# Patient Record
Sex: MALE | Race: BLACK OR AFRICAN AMERICAN | Employment: UNEMPLOYED | URBAN - METROPOLITAN AREA
[De-identification: names, ages, dates, MRNs, and addresses within clinical notes are randomized per-mention and may not be internally consistent; named-entity substitution may affect disease eponyms.]

---

## 2017-02-08 ENCOUNTER — HOSPITAL ENCOUNTER (EMERGENCY)
Age: 62
Discharge: HOME | End: 2017-02-08
Attending: INTERNAL MEDICINE
Payer: COMMERCIAL

## 2017-02-08 VITALS
HEIGHT: 69 IN | WEIGHT: 287 LBS | TEMPERATURE: 98.6 F | SYSTOLIC BLOOD PRESSURE: 146 MMHG | RESPIRATION RATE: 18 BRPM | BODY MASS INDEX: 42.51 KG/M2 | HEART RATE: 94 BPM | DIASTOLIC BLOOD PRESSURE: 82 MMHG | OXYGEN SATURATION: 97 %

## 2017-02-08 DIAGNOSIS — R60.9 EDEMA, UNSPECIFIED TYPE: Primary | ICD-10-CM

## 2017-02-08 PROCEDURE — 99282 EMERGENCY DEPT VISIT SF MDM: CPT

## 2017-02-08 PROCEDURE — 74011250636 HC RX REV CODE- 250/636: Performed by: INTERNAL MEDICINE

## 2017-02-08 PROCEDURE — 96372 THER/PROPH/DIAG INJ SC/IM: CPT

## 2017-02-08 RX ORDER — KETOROLAC TROMETHAMINE 30 MG/ML
30 INJECTION, SOLUTION INTRAMUSCULAR; INTRAVENOUS
Status: COMPLETED | OUTPATIENT
Start: 2017-02-08 | End: 2017-02-08

## 2017-02-08 RX ORDER — BUMETANIDE 2 MG/1
2 TABLET ORAL 3 TIMES DAILY
Qty: 10 TAB | Refills: 0 | Status: SHIPPED | OUTPATIENT
Start: 2017-02-08 | End: 2017-02-12

## 2017-02-08 RX ORDER — HYDROCODONE BITARTRATE AND ACETAMINOPHEN 5; 325 MG/1; MG/1
1 TABLET ORAL
Qty: 6 TAB | Refills: 0 | Status: SHIPPED | OUTPATIENT
Start: 2017-02-08 | End: 2017-03-11

## 2017-02-08 RX ADMIN — KETOROLAC TROMETHAMINE 30 MG: 30 INJECTION, SOLUTION INTRAMUSCULAR at 20:09
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
## 2017-02-09 NOTE — DISCHARGE INSTRUCTIONS
Leg and Ankle Edema: Care Instructions  Your Care Instructions  Swelling in the legs, ankles, and feet is called edema. It is common after you sit or stand for a while. Long plane flights or car rides often cause swelling in the legs and feet. You may also have swelling if you have to stand for long periods of time at your job. Problems with the veins in the legs (varicose veins) and changes in hormones can also cause swelling. Sometimes the swelling in the ankles and feet is caused by a more serious problem, such as heart failure, infection, blood clots, or liver or kidney disease. Follow-up care is a key part of your treatment and safety. Be sure to make and go to all appointments, and call your doctor if you are having problems. Its also a good idea to know your test results and keep a list of the medicines you take. How can you care for yourself at home? · If your doctor gave you medicine, take it as prescribed. Call your doctor if you think you are having a problem with your medicine. · Whenever you are resting, raise your legs up. Try to keep the swollen area higher than the level of your heart. · Take breaks from standing or sitting in one position. ¨ Walk around to increase the blood flow in your lower legs. ¨ Move your feet and ankles often while you stand, or tighten and relax your leg muscles. · Wear support stockings. Put them on in the morning, before swelling gets worse. · Eat a balanced diet. Lose weight if you need to. · Limit the amount of salt (sodium) in your diet. Salt holds fluid in the body and may increase swelling. When should you call for help? Call 911 anytime you think you may need emergency care. For example, call if:  · You have symptoms of a blood clot in your lung (called a pulmonary embolism). These may include:  ¨ Sudden chest pain. ¨ Trouble breathing. ¨ Coughing up blood.   Call your doctor now or seek immediate medical care if:  · You have signs of a blood clot, such as:  ¨ Pain in your calf, back of the knee, thigh, or groin. ¨ Redness and swelling in your leg or groin. · You have symptoms of infection, such as:  ¨ Increased pain, swelling, warmth, or redness. ¨ Red streaks or pus. ¨ A fever. Watch closely for changes in your health, and be sure to contact your doctor if:  · Your swelling is getting worse. · You have new or worsening pain in your legs. · You do not get better as expected. Where can you learn more? Go to http://candice-adri.info/. Enter M352 in the search box to learn more about \"Leg and Ankle Edema: Care Instructions. \"  Current as of: May 27, 2016  Content Version: 11.1  © 5774-6015 [employer], [employer]. Care instructions adapted under license by [employer] (which disclaims liability or warranty for this information). If you have questions about a medical condition or this instruction, always ask your healthcare professional. [place] [address] any warranty or liability for your use of this information.

## 2017-02-09 NOTE — ED NOTES

## 2017-02-09 NOTE — ED NOTES
Patient reports to ED c/o left foot pain. Patient denies injury/trauma. Swelling, warmth and edema noted to foot. Patient ambulatory in room. Patient in NAD. Emergency Department Nursing Plan of Care       The Nursing Plan of Care is developed from the Nursing assessment and Emergency Department Attending provider initial evaluation. The plan of care may be reviewed in the ED Provider note.     The Plan of Care was developed with the following considerations:   Patient / Family readiness to learn indicated by:verbalized understanding  Persons(s) to be included in education: patient  Barriers to Learning/Limitations:No    Signed     Eyal Braden RN    2/8/2017   8:20 PM

## 2017-03-11 ENCOUNTER — HOSPITAL ENCOUNTER (EMERGENCY)
Age: 62
Discharge: HOME | End: 2017-03-11
Attending: EMERGENCY MEDICINE
Payer: COMMERCIAL

## 2017-03-11 VITALS
HEART RATE: 90 BPM | WEIGHT: 285 LBS | TEMPERATURE: 99 F | SYSTOLIC BLOOD PRESSURE: 151 MMHG | BODY MASS INDEX: 44.73 KG/M2 | HEIGHT: 67 IN | DIASTOLIC BLOOD PRESSURE: 87 MMHG | OXYGEN SATURATION: 96 % | RESPIRATION RATE: 18 BRPM

## 2017-03-11 DIAGNOSIS — R60.0 BILATERAL LEG EDEMA: Primary | ICD-10-CM

## 2017-03-11 DIAGNOSIS — S91.109A WOUND, OPEN, TOE, INITIAL ENCOUNTER: ICD-10-CM

## 2017-03-11 LAB
ALBUMIN SERPL BCP-MCNC: 2.7 G/DL (ref 3.5–5)
ALBUMIN/GLOB SERPL: 0.4 {RATIO} (ref 1.1–2.2)
ALP SERPL-CCNC: 136 U/L (ref 45–117)
ALT SERPL-CCNC: 62 U/L (ref 12–78)
ANION GAP BLD CALC-SCNC: 7 MMOL/L (ref 5–15)
AST SERPL W P-5'-P-CCNC: 99 U/L (ref 15–37)
BASOPHILS # BLD AUTO: 0 K/UL (ref 0–0.1)
BASOPHILS # BLD: 0 % (ref 0–1)
BILIRUB SERPL-MCNC: 1.8 MG/DL (ref 0.2–1)
BUN SERPL-MCNC: 8 MG/DL (ref 6–20)
BUN/CREAT SERPL: 11 (ref 12–20)
CALCIUM SERPL-MCNC: 8.7 MG/DL (ref 8.5–10.1)
CHLORIDE SERPL-SCNC: 104 MMOL/L (ref 97–108)
CO2 SERPL-SCNC: 29 MMOL/L (ref 21–32)
CREAT SERPL-MCNC: 0.74 MG/DL (ref 0.7–1.3)
EOSINOPHIL # BLD: 0.1 K/UL (ref 0–0.4)
EOSINOPHIL NFR BLD: 1 % (ref 0–7)
ERYTHROCYTE [DISTWIDTH] IN BLOOD BY AUTOMATED COUNT: 12.8 % (ref 11.5–14.5)
GLOBULIN SER CALC-MCNC: 6.1 G/DL (ref 2–4)
GLUCOSE SERPL-MCNC: 71 MG/DL (ref 65–100)
HCT VFR BLD AUTO: 39.5 % (ref 36.6–50.3)
HGB BLD-MCNC: 13 G/DL (ref 12.1–17)
LYMPHOCYTES # BLD AUTO: 15 % (ref 12–49)
LYMPHOCYTES # BLD: 1.3 K/UL (ref 0.8–3.5)
MCH RBC QN AUTO: 33.2 PG (ref 26–34)
MCHC RBC AUTO-ENTMCNC: 32.9 G/DL (ref 30–36.5)
MCV RBC AUTO: 101 FL (ref 80–99)
MONOCYTES # BLD: 0.9 K/UL (ref 0–1)
MONOCYTES NFR BLD AUTO: 10 % (ref 5–13)
NEUTS SEG # BLD: 6.6 K/UL (ref 1.8–8)
NEUTS SEG NFR BLD AUTO: 74 % (ref 32–75)
PLATELET # BLD AUTO: 80 K/UL (ref 150–400)
POTASSIUM SERPL-SCNC: 4.1 MMOL/L (ref 3.5–5.1)
PROT SERPL-MCNC: 8.8 G/DL (ref 6.4–8.2)
RBC # BLD AUTO: 3.91 M/UL (ref 4.1–5.7)
SODIUM SERPL-SCNC: 140 MMOL/L (ref 136–145)
WBC # BLD AUTO: 8.9 K/UL (ref 4.1–11.1)

## 2017-03-11 PROCEDURE — 85025 COMPLETE CBC W/AUTO DIFF WBC: CPT | Performed by: PHYSICIAN ASSISTANT

## 2017-03-11 PROCEDURE — 80053 COMPREHEN METABOLIC PANEL: CPT | Performed by: PHYSICIAN ASSISTANT

## 2017-03-11 PROCEDURE — 87077 CULTURE AEROBIC IDENTIFY: CPT | Performed by: PHYSICIAN ASSISTANT

## 2017-03-11 PROCEDURE — 87205 SMEAR GRAM STAIN: CPT | Performed by: PHYSICIAN ASSISTANT

## 2017-03-11 PROCEDURE — 87186 SC STD MICRODIL/AGAR DIL: CPT | Performed by: PHYSICIAN ASSISTANT

## 2017-03-11 PROCEDURE — 36415 COLL VENOUS BLD VENIPUNCTURE: CPT | Performed by: PHYSICIAN ASSISTANT

## 2017-03-11 PROCEDURE — 99282 EMERGENCY DEPT VISIT SF MDM: CPT

## 2017-03-11 RX ORDER — SULFAMETHOXAZOLE AND TRIMETHOPRIM 800; 160 MG/1; MG/1
2 TABLET ORAL 2 TIMES DAILY
Qty: 40 TAB | Refills: 0 | Status: SHIPPED | OUTPATIENT
Start: 2017-03-11 | End: 2017-03-21

## 2017-03-11 NOTE — ED NOTES
Emergency Department Nursing Plan of Care       The Nursing Plan of Care is developed from the Nursing assessment and Emergency Department Attending provider initial evaluation. The plan of care may be reviewed in the ED Provider note.     The Plan of Care was developed with the following considerations:   Patient / Family readiness to learn indicated by:verbalized understanding  Persons(s) to be included in education: patient  Barriers to Learning/Limitations:No    [address], RN    3/11/2017   6:52 PM

## 2017-03-12 NOTE — DISCHARGE INSTRUCTIONS
Leg and Ankle Edema: Care Instructions  Your Care Instructions  Swelling in the legs, ankles, and feet is called edema. It is common after you sit or stand for a while. Long plane flights or car rides often cause swelling in the legs and feet. You may also have swelling if you have to stand for long periods of time at your job. Problems with the veins in the legs (varicose veins) and changes in hormones can also cause swelling. Sometimes the swelling in the ankles and feet is caused by a more serious problem, such as heart failure, infection, blood clots, or liver or kidney disease. Follow-up care is a key part of your treatment and safety. Be sure to make and go to all appointments, and call your doctor if you are having problems. Its also a good idea to know your test results and keep a list of the medicines you take. How can you care for yourself at home? · If your doctor gave you medicine, take it as prescribed. Call your doctor if you think you are having a problem with your medicine. · Whenever you are resting, raise your legs up. Try to keep the swollen area higher than the level of your heart. · Take breaks from standing or sitting in one position. ¨ Walk around to increase the blood flow in your lower legs. ¨ Move your feet and ankles often while you stand, or tighten and relax your leg muscles. · Wear support stockings. Put them on in the morning, before swelling gets worse. · Eat a balanced diet. Lose weight if you need to. · Limit the amount of salt (sodium) in your diet. Salt holds fluid in the body and may increase swelling. When should you call for help? Call 911 anytime you think you may need emergency care. For example, call if:  · You have symptoms of a blood clot in your lung (called a pulmonary embolism). These may include:  ¨ Sudden chest pain. ¨ Trouble breathing. ¨ Coughing up blood.   Call your doctor now or seek immediate medical care if:  · You have signs of a blood clot, such as:  ¨ Pain in your calf, back of the knee, thigh, or groin. ¨ Redness and swelling in your leg or groin. · You have symptoms of infection, such as:  ¨ Increased pain, swelling, warmth, or redness. ¨ Red streaks or pus. ¨ A fever. Watch closely for changes in your health, and be sure to contact your doctor if:  · Your swelling is getting worse. · You have new or worsening pain in your legs. · You do not get better as expected. Where can you learn more? Go to http://candice-adri.info/. Enter C638 in the search box to learn more about \"Leg and Ankle Edema: Care Instructions. \"  Current as of: May 27, 2016  Content Version: 11.1  © 4571-1480 [employer]. Care instructions adapted under license by [employer] (which disclaims liability or warranty for this information). If you have questions about a medical condition or this instruction, always ask your healthcare professional. [place] [address] any warranty or liability for your use of this information. Cuts Left Open: Care Instructions  Your Care Instructions    A cut can happen anywhere on your body. Sometimes a cut can injure the tendons, blood vessels, or nerves. A cut may be left open instead of being closed with stitches, staples, or adhesive. A cut may be left open when it is likely to become infected, because closing it can make infection even more likely. You will probably have a bandage. The doctor may want the cut to stay open the whole time it heals. This happens with some cuts when too much time has gone by since the cut happened. Or the doctor may tell you to come back to have the cut closed in 4 to 5 days, when there is less chance of infection. If the cut stays open while healing, your scar may be larger than if the cut was closed. But you can get treatment later to make the scar smaller. The doctor has checked you carefully, but problems can develop later.  If you notice any problems or new symptoms, get medical treatment right away. Follow-up care is a key part of your treatment and safety. Be sure to make and go to all appointments, and call your doctor if you are having problems. It's also a good idea to know your test results and keep a list of the medicines you take. How can you care for yourself at home? · Keep the cut dry for the first 24 to 48 hours. After this, you can shower if your doctor okays it. Pat the cut dry. · Don't soak the cut, such as in a bathtub. Your doctor will tell you when it's safe to get the cut wet. · If your doctor told you how to care for your cut, follow your doctor's instructions. If you did not get instructions, follow this general advice:  ¨ After the first 24 to 48 hours, wash the cut with clean water 2 times a day. Don't use hydrogen peroxide or alcohol, which can slow healing. ¨ You may cover the cut with a thin layer of petroleum jelly, such as Vaseline, and a nonstick bandage. ¨ Apply more petroleum jelly and replace the bandage as needed. · Prop up the injured area on a pillow anytime you sit or lie down during the next 3 days. Try to keep it above the level of your heart. This will help reduce swelling. · Avoid any activity that could cause your cut to get worse. · Take pain medicines exactly as directed. ¨ If the doctor gave you a prescription medicine for pain, take it as prescribed. ¨ If you are not taking a prescription pain medicine, ask your doctor if you can take an over-the-counter medicine. When should you call for help? Call your doctor now or seek immediate medical care if:  · You have new pain, or your pain gets worse. · The cut starts to bleed, and blood soaks through the bandage. Oozing small amounts of blood is normal.  · The skin near the cut is cold or pale or changes color. · You have tingling, weakness, or numbness near the cut. · You have trouble moving the area near the cut.   · You have symptoms of infection, such as:  ¨ Increased pain, swelling, warmth, or redness around the cut. ¨ Red streaks leading from the cut. ¨ Pus draining from the cut. ¨ A fever. Watch closely for changes in your health, and be sure to contact your doctor if:  · The cut is not closing (getting smaller). · You do not get better as expected. Where can you learn more? Go to http://candice-adri.info/. Enter [id number] in the search box to learn more about \"Cuts Left Open: Care Instructions. \"  Current as of: May 27, 2016  Content Version: 11.1  © 9172-1474 [employer]. Care instructions adapted under license by [employer] (which disclaims liability or warranty for this information). If you have questions about a medical condition or this instruction, always ask your healthcare professional. [address] any warranty or liability for your use of this information.

## 2017-03-12 NOTE — ED NOTES
Discharge Instructions Reviewed with patient. Discharge instructions given to patient per this nurse. patient able to return verbalize discharge instructions. Paper copy of discharge instructions given. 1 RX given to patient per this nurse. Patient condition stable, Respiratory status WNL, Neurostatus intact. patient out of er, to home with wife.
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
## 2017-03-13 ENCOUNTER — PATIENT OUTREACH (OUTPATIENT)
Dept: INTERNAL MEDICINE CLINIC | Age: 62
End: 2017-03-13
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
## 2017-03-14 LAB
BACTERIA SPEC CULT: ABNORMAL
BACTERIA SPEC CULT: ABNORMAL
GRAM STN SPEC: ABNORMAL
GRAM STN SPEC: ABNORMAL
SERVICE CMNT-IMP: ABNORMAL

## 2017-03-16 ENCOUNTER — OFFICE VISIT (OUTPATIENT)
Dept: INTERNAL MEDICINE CLINIC | Age: 62
End: 2017-03-16

## 2017-03-16 VITALS
TEMPERATURE: 98.5 F | SYSTOLIC BLOOD PRESSURE: 108 MMHG | HEART RATE: 83 BPM | RESPIRATION RATE: 18 BRPM | OXYGEN SATURATION: 98 % | WEIGHT: 315 LBS | DIASTOLIC BLOOD PRESSURE: 57 MMHG | BODY MASS INDEX: 49.44 KG/M2 | HEIGHT: 67 IN

## 2017-03-16 DIAGNOSIS — I89.0 LYMPHEDEMA: ICD-10-CM

## 2017-03-16 DIAGNOSIS — I87.2 STASIS DERMATITIS OF BOTH LEGS: ICD-10-CM

## 2017-03-16 DIAGNOSIS — S91.301A WOUND, OPEN, FOOT WITH COMPLICATION, RIGHT, INITIAL ENCOUNTER: Primary | ICD-10-CM

## 2017-03-16 DIAGNOSIS — B18.2 HEP C W/O COMA, CHRONIC (HCC): ICD-10-CM

## 2017-03-16 DIAGNOSIS — L03.032 CELLULITIS OF TOE OF LEFT FOOT: ICD-10-CM

## 2017-03-16 DIAGNOSIS — R60.9 EDEMA, PERIPHERAL: ICD-10-CM

## 2017-03-16 DIAGNOSIS — E66.01 MORBID OBESITY WITH BMI OF 45.0-49.9, ADULT (HCC): ICD-10-CM

## 2017-03-16 DIAGNOSIS — I10 ESSENTIAL HYPERTENSION: ICD-10-CM

## 2017-03-16 DIAGNOSIS — I10 ESSENTIAL HYPERTENSION WITH GOAL BLOOD PRESSURE LESS THAN 140/90: ICD-10-CM

## 2017-03-16 RX ORDER — BUMETANIDE 2 MG/1
2 TABLET ORAL DAILY
Qty: 30 TAB | Refills: 3 | Status: SHIPPED | OUTPATIENT
Start: 2017-03-16 | End: 2017-06-01 | Stop reason: SDUPTHER

## 2017-03-16 NOTE — MR AVS SNAPSHOT
Visit Information Date & Time Provider Department Dept. Phone Encounter #  
 3/16/2017 10:00 AM Robles Lee [phone] [id number] Follow-up Instructions Return in about 1 week (around 3/23/2017) for review labs and f/up wound care 15 min . Your Appointments 4/3/2017 11:15 AM  
ROUTINE CARE with MD Robles Lee ([facility]) Appt Note: 1 week fu to review labs and fu wound care/pt requested this date due to going to [facility] the week Segundo Rausch requested [address] [facility] [facility] [phone]  
  
   
 [address] [place] [phone] Upcoming Health Maintenance Date Due Hepatitis C Screening 1955 ZOSTER VACCINE AGE 60> 3/26/2015 FOBT Q 1 YEAR AGE 50-75 9/4/2015 DTaP/Tdap/Td series (2 - Td) 11/13/2023 Allergies as of 3/16/2017  Review Complete On: 3/16/2017 By: Arvind Castro No Known Allergies Current Immunizations  Reviewed on 9/18/2015 Name Date Tdap 11/13/2013 Not reviewed this visit You Were Diagnosed With   
  
 Codes Comments Wound, open, foot with complication, right, initial encounter    -  Primary ICD-10-CM: Y92.193T ICD-9-CM: 892.1 Cellulitis of toe of left foot     ICD-10-CM: T86.466 
ICD-9-CM: 681.10 Essential hypertension     ICD-10-CM: I10 
ICD-9-CM: 401.9 Lymphedema     ICD-10-CM: I89.0 ICD-9-CM: 604.3 Morbid obesity with BMI of 45.0-49.9, adult (HCC)     ICD-10-CM: E66.01, Z68.42 
ICD-9-CM: 278.01, V85.42 Stasis dermatitis of both legs     ICD-10-CM: I83.11, I83.12 
ICD-9-CM: 454.1 Hep C w/o coma, chronic (HCC)     ICD-10-CM: B18.2 ICD-9-CM: 070.54 Edema, peripheral     ICD-10-CM: R60.9 ICD-9-CM: 782.3 Essential hypertension with goal blood pressure less than 140/90     ICD-10-CM: I10 
ICD-9-CM: 401.9 Vitals BP Pulse Temp Resp Height(growth percentile) Weight(growth percentile) 108/57 (BP 1 Location: Right arm, BP Patient Position: Sitting) 83 98.5 °F (36.9 °C) (Oral) 18 5' 7\" (1.702 m) 315 lb 4.8 oz (143 kg) SpO2 BMI Smoking Status 98% 49.38 kg/m2 Current Some Day Smoker Vitals History BMI and BSA Data Body Mass Index Body Surface Area  
 49.38 kg/m 2 2.6 m 2 Preferred Pharmacy Pharmacy Name Phone [facility], [address]  [phone] Your Updated Medication List  
  
   
This list is accurate as of: 3/16/17 11:59 PM.  Always use your most recent med list.  
  
  
  
  
 bumetanide 2 mg tablet Commonly known as:  Sanchez Jess Take 1 Tab by mouth daily. cloNIDine HCl 0.2 mg tablet Commonly known as:  CATAPRES Take 1 Tab by mouth two (2) times a day. diclofenac EC 75 mg EC tablet Commonly known as:  VOLTAREN Take 1 Tab by mouth two (2) times a day. As needed for pain  
  
 gabapentin 100 mg capsule Commonly known as:  NEURONTIN Take 1 Cap by mouth three (3) times daily. methadone 10 mg tablet Commonly known as:  DOLOPHINE Take 125 mg by mouth daily. Indications: OPIOID DEPENDENCE  
  
 trimethoprim-sulfamethoxazole 160-800 mg per tablet Commonly known as:  BACTRIM DS Take 2 Tabs by mouth two (2) times a day for 10 days. Prescriptions Sent to Pharmacy Refills  
 bumetanide (BUMEX) 2 mg tablet 3 Sig: Take 1 Tab by mouth daily. Class: Normal  
 Pharmacy: [id number] [address] #: [phone] Route: Oral  
  
We Performed the Following CBC WITH AUTOMATED DIFF [11481 CPT(R)] HCV RNA MAMADOU QUALITATIVE [64898 CPT(R)] HEMOGLOBIN A1C WITH EAG [02102 CPT(R)] HEP C GENOTYPE [id number] CPT(R)] HIV 1/2 AG/AB, 4TH GENERATION,W RFLX CONFIRM [[id number] Custom] REFERRAL TO WOUND CARE [LNB727 Custom] Comments:  
 Please evaluate patient for wound on right foot TSH 3RD GENERATION [47597 CPT(R)] URIC ACID [id number] CPT(R)] Follow-up Instructions Return in about 1 week (around 3/23/2017) for review labs and f/up wound care 15 min . To-Do List   
 03/17/2017 1:45 PM  
  Appointment with Dahiana Mathias DPM at [address] [place] [address]. ([phone]) Referral Information Referral ID Referred By Referred To  
  
 [id number] [facility], [address] [facility], DPM   
   [address] [place] [address] [facility], [address] Phone: [phone] Fax: [phone] Visits Status Start Date End Date 1 New Request 3/16/17 3/16/18 If your referral has a status of pending review or denied, additional information will be sent to support the outcome of this decision. Patient Instructions Please call with any concerns Introducing [facility]! Dear Anneliese Sanon: Thank you for requesting a Playmysong account. Our records indicate that you already have an active Playmysong account. You can access your account anytime at [email or web address] Did you know that you can access your hospital and ER discharge instructions at any time in Playmysong? You can also review all of your test results from your hospital stay or ER visit. Additional Information If you have questions, please visit the Frequently Asked Questions section of the Playmysong website at [email or web address] Remember, Playmysong is NOT to be used for urgent needs. For medical emergencies, dial 911. Now available from your iPhone and Android! Please provide this summary of care documentation to your next provider. Your primary care clinician is listed as Kishor Christianson.  If you have any questions after today's visit, please call [phone].
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
## 2017-03-16 NOTE — PROGRESS NOTES
Chief Complaint   Patient presents with    Hypertension     Referal  needed for wound care     Foot Pain

## 2017-03-17 DIAGNOSIS — R60.9 EDEMA, PERIPHERAL: ICD-10-CM

## 2017-03-17 DIAGNOSIS — I10 ESSENTIAL HYPERTENSION WITH GOAL BLOOD PRESSURE LESS THAN 140/90: ICD-10-CM

## 2017-03-24 LAB
BASOPHILS # BLD AUTO: 0 X10E3/UL (ref 0–0.2)
BASOPHILS NFR BLD AUTO: 0 %
EOSINOPHIL # BLD AUTO: 0.2 X10E3/UL (ref 0–0.4)
EOSINOPHIL NFR BLD AUTO: 3 %
ERYTHROCYTE [DISTWIDTH] IN BLOOD BY AUTOMATED COUNT: 13.5 % (ref 12.3–15.4)
EST. AVERAGE GLUCOSE BLD GHB EST-MCNC: 94 MG/DL
HBA1C MFR BLD: 4.9 % (ref 4.8–5.6)
HCT VFR BLD AUTO: 36.5 % (ref 37.5–51)
HCV GENTYP SERPL NAA+PROBE: NORMAL
HCV RNA SERPL QL NAA+PROBE: POSITIVE
HGB BLD-MCNC: 12.3 G/DL (ref 12.6–17.7)
HIV 1+2 AB+HIV1 P24 AG SERPL QL IA: NON REACTIVE
IMM GRANULOCYTES # BLD: 0 X10E3/UL (ref 0–0.1)
IMM GRANULOCYTES NFR BLD: 1 %
LYMPHOCYTES # BLD AUTO: 2.4 X10E3/UL (ref 0.7–3.1)
LYMPHOCYTES NFR BLD AUTO: 39 %
MCH RBC QN AUTO: 32.8 PG (ref 26.6–33)
MCHC RBC AUTO-ENTMCNC: 33.7 G/DL (ref 31.5–35.7)
MCV RBC AUTO: 97 FL (ref 79–97)
MONOCYTES # BLD AUTO: 0.8 X10E3/UL (ref 0.1–0.9)
MONOCYTES NFR BLD AUTO: 12 %
NEUTROPHILS # BLD AUTO: 2.9 X10E3/UL (ref 1.4–7)
NEUTROPHILS NFR BLD AUTO: 45 %
PLATELET # BLD AUTO: 138 X10E3/UL (ref 150–379)
PLEASE NOTE, 550474: NORMAL
RBC # BLD AUTO: 3.75 X10E6/UL (ref 4.14–5.8)
TSH SERPL DL<=0.005 MIU/L-ACNC: 2.07 UIU/ML (ref 0.45–4.5)
URATE SERPL-MCNC: 3.8 MG/DL (ref 3.7–8.6)
WBC # BLD AUTO: 6.3 X10E3/UL (ref 3.4–10.8)

## 2017-04-18 ENCOUNTER — HOSPITAL ENCOUNTER (EMERGENCY)
Age: 62
Discharge: HOME | End: 2017-04-18
Attending: EMERGENCY MEDICINE
Payer: COMMERCIAL

## 2017-04-18 ENCOUNTER — APPOINTMENT (OUTPATIENT)
Dept: GENERAL RADIOLOGY | Age: 62
End: 2017-04-18
Attending: EMERGENCY MEDICINE
Payer: COMMERCIAL

## 2017-04-18 VITALS
SYSTOLIC BLOOD PRESSURE: 141 MMHG | OXYGEN SATURATION: 94 % | HEART RATE: 82 BPM | WEIGHT: 315 LBS | DIASTOLIC BLOOD PRESSURE: 77 MMHG | TEMPERATURE: 98.4 F | RESPIRATION RATE: 16 BRPM | HEIGHT: 67 IN | BODY MASS INDEX: 49.44 KG/M2

## 2017-04-18 DIAGNOSIS — G89.29 CHRONIC FOOT PAIN, RIGHT: Primary | ICD-10-CM

## 2017-04-18 DIAGNOSIS — M79.671 CHRONIC FOOT PAIN, RIGHT: Primary | ICD-10-CM

## 2017-04-18 PROCEDURE — 74011250637 HC RX REV CODE- 250/637: Performed by: EMERGENCY MEDICINE

## 2017-04-18 PROCEDURE — 73630 X-RAY EXAM OF FOOT: CPT

## 2017-04-18 PROCEDURE — 99283 EMERGENCY DEPT VISIT LOW MDM: CPT

## 2017-04-18 RX ORDER — DOXYCYCLINE HYCLATE 100 MG
100 TABLET ORAL
Status: COMPLETED | OUTPATIENT
Start: 2017-04-18 | End: 2017-04-18

## 2017-04-18 RX ORDER — DOXYCYCLINE HYCLATE 100 MG
100 TABLET ORAL 2 TIMES DAILY
Qty: 14 TAB | Refills: 0 | Status: SHIPPED | OUTPATIENT
Start: 2017-04-18 | End: 2017-04-25

## 2017-04-18 RX ADMIN — DOXYCYCLINE HYCLATE 100 MG: 100 TABLET, COATED ORAL at 21:49

## 2017-04-19 ENCOUNTER — PATIENT OUTREACH (OUTPATIENT)
Dept: INTERNAL MEDICINE CLINIC | Age: 62
End: 2017-04-19

## 2017-04-19 NOTE — ED TRIAGE NOTES
Triage note: Patient arriving c/o RIGHT foot pain to the anterior portion of the great toe. No obvious wounds or drainage. Pain has been increasing over the last 4-5 days. Saw a Shaun Yu MD (wound care) about a month ago but never followed up. Was prescribed steroids and a cream, but the pain never got any better. Denies specific trauma or injury.
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
## 2017-04-19 NOTE — DISCHARGE INSTRUCTIONS
We hope that we have addressed all of your medical concerns. The examination and treatment you received in the Emergency Department were for an emergent problem and were not intended as complete care. It is important that you follow up with your healthcare provider(s) for ongoing care. If your symptoms worsen or do not improve as expected, and you are unable to reach your usual health care provider(s), you should return to the Emergency Department. Today's healthcare is undergoing tremendous change, and patient satisfaction surveys are one of the many tools to assess the quality of medical care. You may receive a survey from the [employer] organization regarding your experience in the Emergency Department. I hope that your experience has been completely positive, particularly the medical care that I provided. As such, please participate in the survey; anything less than excellent does not meet my expectations or intentions. [facility] and 8 [facility] participate in nationally recognized quality of care measures. If your blood pressure is greater than 120/80, as reported below, we urge that you seek medical care to address the potential of high blood pressure, commonly known as hypertension. Hypertension can be hereditary or can be caused by certain medical conditions, pain, stress, or \"white coat syndrome. \"       Please make an appointment with your health care provider(s) for follow up of your Emergency Department visit. VITALS:   Patient Vitals for the past 8 hrs:   Temp Pulse Resp BP SpO2   04/18/17 2044 98.4 °F (36.9 °C) 82 16 141/77 94 %          Thank you for allowing us to provide you with medical care today. We realize that you have many choices for your emergency care needs. Please choose us in the future for any continued health care needs. Gabino Evans, [address].   Office: [phone]            No results found for this or any previous visit (from the past 24 hour(s)). Xr Foot Rt Min 3 V    Result Date: 4/18/2017  EXAM:  XR FOOT RT MIN 3 V INDICATION:  Chronic pain to great toe. COMPARISON:  7/18/2016 FINDINGS:  Three views of the right foot demonstrate no fracture, dislocation or apparent osteomyelitis. There is no soft tissue foreign body or emphysema. There is mild osteoarthritis. There is no change. IMPRESSION:  No acute abnormality. Wound Care: After Your Visit  Your Care Instructions  Taking good care of your wound at home will help it heal quickly and reduce your chance of infection. The doctor has checked you carefully, but problems can develop later. If you notice any problems or new symptoms, get medical treatment right away. Follow-up care is a key part of your treatment and safety. Be sure to make and go to all appointments, and call your doctor if you are having problems. It's also a good idea to know your test results and keep a list of the medicines you take. How can you care for yourself at home? · Clean the area with soap and water 2 times a day unless your doctor gives you different instructions. Don't use hydrogen peroxide or alcohol, which can slow healing. ¨ You may cover the wound with a thin layer of antibiotic ointment, such as bacitracin, and a nonstick bandage. ¨ Apply more ointment and replace the bandage as needed. · Take pain medicines exactly as directed. Some pain is normal with a wound, but do not ignore pain that is getting worse instead of better. You could have an infection. ¨ If the doctor gave you a prescription medicine for pain, take it as prescribed. ¨ If you are not taking a prescription pain medicine, ask your doctor if you can take an over-the-counter medicine. · Your doctor may have closed your wound with stitches (sutures), staples, or skin glue.   ¨ If you have stitches, your doctor may remove them after several days to 2 weeks. Or you may have stitches that dissolve on their own. ¨ If you have staples, your doctor may remove them after 7 to 10 days. ¨ If your wound was closed with skin glue, the glue will wear off in a few days to 2 weeks. When should you call for help? Call your doctor now or seek immediate medical care if:  · You have signs of infection, such as:  ¨ Increased pain, swelling, warmth, or redness near the wound. ¨ Red streaks leading from the wound. ¨ Pus draining from the wound. ¨ A fever. · You bleed so much from your incision that you soak one or more bandages over 2 to 4 hours. Watch closely for changes in your health, and be sure to contact your doctor if:  · The wound is not getting better each day. Where can you learn more? Go to Fleep.be  Enter M973 in the search box to learn more about \"Wound Care: After Your Visit. \"   © 0777-6893 Healthwise, [employer]. Care instructions adapted under license by [employer] (which disclaims liability or warranty for this information). This care instruction is for use with your licensed healthcare professional. If you have questions about a medical condition or this instruction, always ask your healthcare professional. [place] [address] any warranty or liability for your use of this information. Content Version: 65.2.192556;  Last Revised: April 23, 2012
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
## 2017-05-10 ENCOUNTER — PATIENT OUTREACH (OUTPATIENT)
Dept: INTERNAL MEDICINE CLINIC | Age: 62
End: 2017-05-10

## 2017-05-10 NOTE — PROGRESS NOTES
Attempted to reach patient via telephone, left message to call Panel Manager. Will attempt to reach patient again.

## 2017-05-31 RX ORDER — CLONIDINE HYDROCHLORIDE 0.2 MG/1
0.2 TABLET ORAL 2 TIMES DAILY
Qty: 60 TAB | Refills: 3 | Status: SHIPPED | OUTPATIENT
Start: 2017-05-31 | End: 2018-01-01

## 2017-06-01 ENCOUNTER — OFFICE VISIT (OUTPATIENT)
Dept: INTERNAL MEDICINE CLINIC | Age: 62
End: 2017-06-01

## 2017-06-01 VITALS
RESPIRATION RATE: 18 BRPM | OXYGEN SATURATION: 98 % | BODY MASS INDEX: 48.61 KG/M2 | HEART RATE: 77 BPM | WEIGHT: 309.7 LBS | DIASTOLIC BLOOD PRESSURE: 73 MMHG | SYSTOLIC BLOOD PRESSURE: 130 MMHG | HEIGHT: 67 IN | TEMPERATURE: 97.8 F

## 2017-06-01 DIAGNOSIS — L97.509 FOOT ULCER, UNSPECIFIED LATERALITY, WITH UNSPECIFIED SEVERITY (HCC): ICD-10-CM

## 2017-06-01 DIAGNOSIS — B18.2 HEP C W/O COMA, CHRONIC (HCC): ICD-10-CM

## 2017-06-01 DIAGNOSIS — I10 ESSENTIAL HYPERTENSION WITH GOAL BLOOD PRESSURE LESS THAN 140/90: Primary | ICD-10-CM

## 2017-06-01 DIAGNOSIS — F41.8 DEPRESSION WITH ANXIETY: ICD-10-CM

## 2017-06-01 DIAGNOSIS — Z00.00 MEDICARE ANNUAL WELLNESS VISIT, SUBSEQUENT: ICD-10-CM

## 2017-06-01 DIAGNOSIS — M79.604 PAIN OF RIGHT LOWER EXTREMITY: ICD-10-CM

## 2017-06-01 DIAGNOSIS — R60.9 EDEMA, PERIPHERAL: ICD-10-CM

## 2017-06-01 DIAGNOSIS — Z12.11 COLON CANCER SCREENING: ICD-10-CM

## 2017-06-01 DIAGNOSIS — R60.9 PERIPHERAL EDEMA: ICD-10-CM

## 2017-06-01 RX ORDER — CLONIDINE HYDROCHLORIDE 0.2 MG/1
0.2 TABLET ORAL 2 TIMES DAILY
Qty: 60 TAB | Refills: 3 | Status: CANCELLED | OUTPATIENT
Start: 2017-06-01

## 2017-06-01 RX ORDER — DICLOFENAC SODIUM 75 MG/1
75 TABLET, DELAYED RELEASE ORAL 2 TIMES DAILY
Qty: 60 TAB | Refills: 0 | Status: SHIPPED | OUTPATIENT
Start: 2017-06-01 | End: 2017-07-03 | Stop reason: SDUPTHER

## 2017-06-01 RX ORDER — BUMETANIDE 2 MG/1
2 TABLET ORAL DAILY
Qty: 30 TAB | Refills: 3 | Status: SHIPPED | OUTPATIENT
Start: 2017-06-01 | End: 2018-01-01 | Stop reason: ALTCHOICE
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
## 2017-06-01 NOTE — ACP (ADVANCE CARE PLANNING)
I met with the patient to discuss advanced medical directives (AMD). The meeting was held at my  office. I discussed the advantages to completing the AMD through a facilitated discussion with his chosen healthcare agent as well as criteria to consider when selecting an agent. The patient was agreeable to receiving and reviewing Honoring Choices written information. And is planning to discuss advanced directives during the next office visit . Handouts given to pt on honoring choices tube feeding cpr ventilation support and how to choose a health care agent. Time spent with pt  5 minutes     Bernie Doran M.D.   [facility] facilitator

## 2017-06-01 NOTE — MR AVS SNAPSHOT
Visit Information Date & Time Provider Department Dept. Phone Encounter #  
 6/1/2017 11:15  [facility] [place] [address]  [place] [id number] Follow-up Instructions Return in about 1 month (around 7/1/2017) for 45 min appt at 3:15 to discuss advance directives book for 45 min please . Upcoming Health Maintenance Date Due ZOSTER VACCINE AGE 60> 3/26/2015 FOBT Q 1 YEAR AGE 50-75 9/4/2015 INFLUENZA AGE 9 TO ADULT 8/1/2017 MEDICARE YEARLY EXAM 6/2/2018 DTaP/Tdap/Td series (2 - Td) 11/13/2023 Allergies as of 6/1/2017  Review Complete On: 6/1/2017 By: Kirill Corbin No Known Allergies Current Immunizations  Reviewed on 9/18/2015 Name Date Tdap 11/13/2013 Not reviewed this visit You Were Diagnosed With   
  
 Codes Comments Depression with anxiety    -  Primary ICD-10-CM: F41.8 ICD-9-CM: 300.4 Edema, peripheral     ICD-10-CM: R60.9 ICD-9-CM: 782.3 Essential hypertension with goal blood pressure less than 140/90     ICD-10-CM: I10 
ICD-9-CM: 401.9 Pain of right lower extremity     ICD-10-CM: M79.604 ICD-9-CM: 729.5 Foot ulcer, unspecified laterality, with unspecified severity     ICD-10-CM: L97.509 ICD-9-CM: 707.15 Peripheral edema     ICD-10-CM: R60.9 ICD-9-CM: 782.3 Hep C w/o coma, chronic (HCC)     ICD-10-CM: B18.2 ICD-9-CM: 070.54 Colon cancer screening     ICD-10-CM: Z12.11 ICD-9-CM: V76.51 Medicare annual wellness visit, subsequent     ICD-10-CM: Z00.00 ICD-9-CM: V70.0 Vitals BP Pulse Temp Resp Height(growth percentile) Weight(growth percentile) 130/73 (BP 1 Location: Left arm, BP Patient Position: Sitting) 77 97.8 °F (36.6 °C) (Oral) 18 5' 7\" (1.702 m) 309 lb 11.2 oz (140.5 kg) SpO2 BMI Smoking Status 98% 48.51 kg/m2 Current Some Day Smoker BMI and BSA Data Body Mass Index Body Surface Area  48.51 kg/m 2 2.58 m 2  
 Preferred Pharmacy Pharmacy Name Phone [facility], [address]  [phone] Your Updated Medication List  
  
   
This list is accurate as of: 6/1/17 11:25 AM.  Always use your most recent med list.  
  
  
  
  
 bumetanide 2 mg tablet Commonly known as:  Younger Mckeon Take 1 Tab by mouth daily. cloNIDine HCl 0.2 mg tablet Commonly known as:  CATAPRES Take 1 Tab by mouth two (2) times a day. diclofenac EC 75 mg EC tablet Commonly known as:  VOLTAREN Take 1 Tab by mouth two (2) times a day. As needed for pain  
  
 gabapentin 100 mg capsule Commonly known as:  NEURONTIN Take 1 Cap by mouth three (3) times daily. methadone 10 mg tablet Commonly known as:  DOLOPHINE Take 125 mg by mouth daily. Indications: OPIOID DEPENDENCE Prescriptions Sent to Pharmacy Refills  
 bumetanide (BUMEX) 2 mg tablet 3 Sig: Take 1 Tab by mouth daily. Class: Normal  
 Pharmacy: [address] Ph #: [phone] Route: Oral  
 diclofenac EC (VOLTAREN) 75 mg EC tablet 0 Sig: Take 1 Tab by mouth two (2) times a day. As needed for pain  
 Class: Normal  
 Pharmacy: [address] Ph #: [phone] Route: Oral  
  
We Performed the Following OCCULT BLOOD, IMMUNOASSAY (FIT) [id number] CPT(R)] REFERRAL TO LIVER HEPATOLOGY [OXC333 Custom] Comments:  
 Please evaluate patient for hep c treatment REFERRAL TO PAIN MANAGEMENT [IAU468 Custom] Comments:  
 Please evaluate patient for chronic pain Follow-up Instructions Return in about 1 month (around 7/1/2017) for 45 min appt at 3:15 to discuss advance directives book for 45 min please . Referral Information Referral ID Referred By Referred To  
  
 [id number] Ailyn Souza MD   
   [address] [place] [address] [address] Jani Salas Phone: [phone] Fax: [phone] Visits Status Start Date End Date 1 New Request 6/1/17 6/1/18 If your referral has a status of pending review or denied, additional information will be sent to support the outcome of this decision. Referral ID Referred By Referred To  
 [id number] [facility] [address] [address], [address] [place] [address] Visits Status Start Date End Date 1 New Request 6/1/17 6/1/18 If your referral has a status of pending review or denied, additional information will be sent to support the outcome of this decision. Patient Instructions Ask your foot doctor about  Farrow compression wrap. Warm compress to your arm as needed to help with pain Introducing \[facility]\"" & [facility] SERVICES! Dear [employer]: Thank you for requesting a SGB account. Our records indicate that you already have an active SGB account. You can access your account anytime at [email or web address] Did you know that you can access your hospital and ER discharge instructions at any time in SGB? You can also review all of your test results from your hospital stay or ER visit. Additional Information If you have questions, please visit the Frequently Asked Questions section of the SGB website at [email or web address] Remember, SGB is NOT to be used for urgent needs. For medical emergencies, dial 911. Now available from your iPhone and Android! Please provide this summary of care documentation to your next provider. Your primary care clinician is listed as Preeti Benaivdes. If you have any questions after today's visit, please call [phone].

## 2017-06-01 NOTE — PATIENT INSTRUCTIONS
Ask your foot doctor about  Farrow compression wrap.    Warm compress to your arm as needed to help with pain

## 2017-06-01 NOTE — PROGRESS NOTES
Chief Complaint   Patient presents with    Hypertension    Depression     1. Have you been to the ER, urgent care clinic since your last visit? Hospitalized since your last visit? 4/18/17  Foot pain    2. Have you seen or consulted any other health care providers outside of the [facility] since your last visit? Include any pap smears or colon screening.  Yes Reason for visit: Pain management

## 2017-06-15 ENCOUNTER — PATIENT OUTREACH (OUTPATIENT)
Dept: INTERNAL MEDICINE CLINIC | Age: 62
End: 2017-06-15

## 2017-08-14 RX ORDER — DICLOFENAC SODIUM 75 MG/1
TABLET, DELAYED RELEASE ORAL
Qty: 60 TAB | Refills: 1 | Status: SHIPPED | OUTPATIENT
Start: 2017-08-14 | End: 2018-01-01 | Stop reason: CLARIF

## 2017-08-31 ENCOUNTER — OFFICE VISIT (OUTPATIENT)
Dept: HEMATOLOGY | Age: 62
End: 2017-08-31

## 2017-08-31 VITALS
HEART RATE: 78 BPM | DIASTOLIC BLOOD PRESSURE: 61 MMHG | OXYGEN SATURATION: 96 % | BODY MASS INDEX: 47.46 KG/M2 | WEIGHT: 302.4 LBS | SYSTOLIC BLOOD PRESSURE: 125 MMHG | TEMPERATURE: 98.6 F | HEIGHT: 67 IN

## 2017-08-31 DIAGNOSIS — B18.2 CHRONIC HEPATITIS C WITHOUT HEPATIC COMA (HCC): Primary | ICD-10-CM

## 2017-08-31 DIAGNOSIS — I70.90 ATHEROSCLEROSIS: ICD-10-CM

## 2017-08-31 DIAGNOSIS — K74.60 CIRRHOSIS OF LIVER WITHOUT ASCITES, UNSPECIFIED HEPATIC CIRRHOSIS TYPE (HCC): ICD-10-CM

## 2017-08-31 NOTE — PROGRESS NOTES
[address] E Day Londono MD, [address], [place], [place]       TERRI Guerra PA-C Mee Macleod, VONNIE-BC   TERRI Swanson NP        at [address], [address] Karla Bell  22.     [phone]     FAX: [phone]    at [address], [address] [place], [address]     [phone]     FAX: [phone]         Patient Care Team:  [address] Domi Dave MD as PCP - General (Family Practice)  Silvestre Martinez RN as Nurse Navigator  Nazario Harris LPN as Nurse Navigator      Problem List  Date Reviewed: 8/31/2017          Codes Class Noted    Lipidemia ICD-10-CM: E78.5  ICD-9-CM: 272.4  1/4/2016        Depression with anxiety ICD-10-CM: F41.8  ICD-9-CM: 300.4  10/26/2015        Erectile dysfunction ICD-10-CM: N52.9  ICD-9-CM: 607.84  10/26/2015        History of heroin use ICD-10-CM: Z86.59  ICD-9-CM: V11.8  7/1/2015        Chronic back pain greater than 3 months duration ICD-10-CM: M54.9, G89.29  ICD-9-CM: 724.5, 338.29  6/5/2015        Essential hypertension ICD-10-CM: I10  ICD-9-CM: 401.9  6/5/2015        Chronic hepatitis C ([facility] ICD-10-CM: B18.2  ICD-9-CM: 070.54  5/21/2015        Edema, peripheral ICD-10-CM: R60.9  ICD-9-CM: 782.3  5/6/2011        Substance abuse ICD-10-CM: F19.10  ICD-9-CM: 305.90  5/6/2011                The physicians listed above have asked me to see [facility] in consultation regarding chronic HCV and its management. All medical records sent by the referring physicians were reviewed including imaging studies     The patient is a 58 y.o. Black male who was noted to have abnormalities in liver chemistries and subsequently tested positive for chronic HCV in 1990s. Risk factors for acquiring HCV are IV drug use in 1960s - [address].         There was no history of acute incteric hepatitis at the time of these risk factors. Ultrasound of the liver was performed in 2015. The results of the imaging suggested chronic liver disease. An assessment of liver fibrosis with biopsy or elastography has not been performed. The patient has never received treatment for chronic HCV. The most recent laboratory studies indicate that the AST is elevated, ALT is normal, alkaline phosphatase is elevated, tests of hepatic synthetic and metabolic function are depressed, total bilirubin is elevated, INR is elevated, albumin is  depressed, and the platelet count is depressed. The patient notes fatigue, swelling of the abdomen, swelling of the lower extremities, problems concentrating,     The patient has not experienced hematemesis, hematochezia. The patient has limitations in functional activities which can be attributed to the liver disease and to other medical problems that are not related to the liver disease. ALLERGIES  No Known Allergies    MEDICATIONS  Current Outpatient Prescriptions   Medication Sig    diclofenac EC (VOLTAREN) 75 mg EC tablet TAKE 1 TABLET BY MOUTH TWICE DAILY AS NEEDED FOR PAIN    bumetanide (BUMEX) 2 mg tablet Take 1 Tab by mouth daily.  cloNIDine HCl (CATAPRES) 0.2 mg tablet Take 1 Tab by mouth two (2) times a day.  gabapentin (NEURONTIN) 100 mg capsule Take 1 Cap by mouth three (3) times daily.  methadone (DOLOPHINE) 10 mg tablet Take 125 mg by mouth daily. Indications: OPIOID DEPENDENCE     No current facility-administered medications for this visit. SYSTEM REVIEW NOT RELATED TO LIVER DISEASE OR REVIEWED ABOVE:  Constitution systems: Negative for fever, chills, weight gain, weight loss. Eyes: Negative for visual changes. ENT: Negative for sore throat, painful swallowing. Respiratory: Negative for cough, hemoptysis, SOB. Cardiology: Negative for chest pain, palpitations. GI:  Negative for constipation or diarrhea.   : Negative for urinary frequency, dysuria, hematuria, nocturia. Skin: Negative for rash. Hematology: Negative for easy bruising, blood clots. Musculo-skelatal: Negative for back pain, muscle pain, weakness. Neurologic: Negative for headaches, dizziness, vertigo, memory problems not related to HE. Psychology: Negative for anxiety, depression. FAMILY HISTORY:  The father  of unknown cause. The mother  of dementia. There is no family history of liver disease. SOCIAL HISTORY:  The patient is . The patient has 3 children, and 11 grandchildren. The patient currently smokes 2 cigarettes daily. The patient has never consumed significant amounts of alcohol. The patient is currently receiving disability. PHYSICAL EXAMINATION:  Visit Vitals    /61 (BP 1 Location: Left arm, BP Patient Position: Sitting)    Pulse 78    Temp 98.6 °F (37 °C) (Tympanic)    Ht 5' 7\" (1.702 m)    Wt 302 lb 6.4 oz (137.2 kg)    SpO2 96%    BMI 47.36 kg/m2     General: No acute distress. Eyes: Sclera anicteric. ENT: No oral lesions. Thyroid normal.  Nodes: No adenopathy. Skin: No spider angiomata. No jaundice. No palmar erythema. Respiratory: Lungs clear to auscultation. Cardiovascular: Regular heart rate. No murmurs. No JVD. Abdomen: Soft non-tender. Liver size normal to percussion/palpation. Spleen not palpable. No obvious ascites. Extremities: No edema. No muscle wasting. No gross arthritic changes. Neurologic: Alert and oriented. Cranial nerves grossly intact. No asterixis.     LABORATORY STUDIES:  [facility] [place] of [address] [place] [address] 3/11/2017 2016   WBC 3.4 - 10.8 x10E3/uL 8.9 4.9   ANC 1.4 - 7.0 x10E3/uL 6.6 2.2   HGB 12.6 - 17.7 g/dL 13.0 12.6    - 379 x10E3/uL 80 (L) 90 (L)   AST 15 - 37 U/L 99 (H) 68 (H)   ALT 12 - 78 U/L 62 33   Alk Phos 45 - 117 U/L 136 (H) 104   Bili, Total 0.2 - 1.0 MG/DL 1.8 (H) 1.2 (H)   Albumin 3.5 - 5.0 g/dL 2.7 (L) 3.0 (L)   BUN 6 - 20 MG/DL 8 12   Creat 0.70 - 1.30 MG/DL 0.74 0.76   Na 136 - 145 mmol/L 140 138   K 3.5 - 5.1 mmol/L 4.1 4.3   Cl 97 - 108 mmol/L 104 104   CO2 21 - 32 mmol/L 29 31   Glucose 65 - 100 mg/dL 71 86   Ammonia <32 UMOL/L       SEROLOGIES:  Serologies 3/21/2017   Hep C Genotype 1b     LIVER HISTOLOGY:  Not available or performed    ENDOSCOPIC PROCEDURES:  Not available or performed    RADIOLOGY:  7/2015. Ultrasound of liver. Echogenic consistent with chronic liver disease. No liver mass lesions. No dilated bile ducts. No ascites. OTHER TESTING:  Not available or performed    ASSESSMENT AND PLAN:  Chronic HCV with cirrhosis. Liver function is depressed. Total bilirubin is elevated. Serum albumin is depressed. INR is prolonged. The platelet count is depressed. Based upon laboratory studies the patient appears to have cirrhosis. Will perform laboratory testing to monitor liver function and degree of liver injury. This included BMP, hepatic panel, CBC with platelet count, INR. Will perform and/or review results of HCV viral load and HCV genotype to define the specific treatment and duration of treatment that will be required. Will perform serologic and virologic studies to assess for other causes of chronic liver disease. Will perform imaging of the liver with ultrasound. If ascites is present he would qualify for enrollment into the [facility] [address] study for paitents with CHild class C cirrhosis and CTP of 10. The Fibroscan can assess liver fibrosis and determine if a patient has advanced fibrosis or cirrhosis without the need for liver biopsy. The Fibroscan is currently available at [facility]. This will be performed at the next office visit. Given body size and the possibility of ascites we might not be able to get a reading with Fibroscan. Will perform Fibrosure. The patient has not previously been treated for HCV. The patient has HCV genotype 1B.     Given that he has decompensated liver disease he could only be treated with Harvoni or Epclusa. The patient was counseled regarding alcohol consumption. The need for vaccination against viral hepatitis A and B will be assessed with serologic and instituted as appropriate. Will schedule for EGD to evaluate for esophageal varices and need to administer treatment to reduce risk of first variceal bleed. [address] screening will be performed. AFP was ordered today and ultrasound will be scheduled. 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 No treatment necessary. Platelet count is adequate for patient to be treated for HCV. All of the above issues were discussed with the patient. All questions were answered. The patient expressed a clear understanding of the above. Follow-up [facility] for [address], and to initiate HCV treatment in the [place] clinical trial if he has a CTP score of 10 or with epclusa.     Negrito Griggs MD  [facility] [place] [address] 5454 [facility] Lopez StreetKarla  22.  [phone]

## 2017-08-31 NOTE — MR AVS SNAPSHOT
Visit Information Date & Time Provider Department Dept. Phone Encounter #  
 8/31/2017  3:00 PM Belem Ashraf MD [facility] [id number] Follow-up Instructions Return for JORGE/Shazia/NP. Your Appointments 9/7/2017 12:30 PM  
PROCEDURE with Belem Ashraf MD  
[facility] [address] Megan Dave ([address]) Appt Note: EGD  
 [address] [facility] [phone] [facility] [address]  
[address] Upcoming Health Maintenance Date Due ZOSTER VACCINE AGE 60> 1/26/2015 FOBT Q 1 YEAR AGE 50-75 9/4/2015 INFLUENZA AGE 9 TO ADULT 8/1/2017 MEDICARE YEARLY EXAM 6/2/2018 DTaP/Tdap/Td series (2 - Td) 11/13/2023 Allergies as of 8/31/2017  Review Complete On: 8/31/2017 By: Avinash Mendoza LPN No Known Allergies Current Immunizations  Reviewed on 9/18/2015 Name Date Tdap 11/13/2013 Not reviewed this visit You Were Diagnosed With   
  
 Codes Comments Chronic hepatitis C without hepatic coma (HCC)    -  Primary ICD-10-CM: B18.2 ICD-9-CM: 070.54 Cirrhosis of liver without ascites, unspecified hepatic cirrhosis type ([facility]     ICD-10-CM: K74.60 ICD-9-CM: 571.5 Atherosclerosis     ICD-10-CM: I70.90 ICD-9-CM: 440.9 Vitals BP Pulse Temp Height(growth percentile) Weight(growth percentile) SpO2  
 125/61 (BP 1 Location: Left arm, BP Patient Position: Sitting) 78 98.6 °F (37 °C) (Tympanic) 5' 7\" (1.702 m) 302 lb 6.4 oz (137.2 kg) 96% BMI Smoking Status 47.36 kg/m2 Current Some Day Smoker BMI and BSA Data Body Mass Index Body Surface Area  
 47.36 kg/m 2 2.55 m 2 Preferred Pharmacy Pharmacy Name Phone [address], [address] [phone] Your Updated Medication List  
  
   
 This list is accurate as of: 8/31/17  4:01 PM.  Always use your most recent med list.  
  
  
  
  
 bumetanide 2 mg tablet Commonly known as:  [place] Take 1 Tab by mouth daily. cloNIDine HCl 0.2 mg tablet Commonly known as:  CATAPRES Take 1 Tab by mouth two (2) times a day. diclofenac EC 75 mg EC tablet Commonly known as:  VOLTAREN  
TAKE 1 TABLET BY MOUTH TWICE DAILY AS NEEDED FOR PAIN  
  
 gabapentin 100 mg capsule Commonly known as:  NEURONTIN Take 1 Cap by mouth three (3) times daily. methadone 10 mg tablet Commonly known as:  DOLOPHINE Take 125 mg by mouth daily. Indications: OPIOID DEPENDENCE We Performed the Following AFP WITH AFP-L3% [[id number] Custom] CBC WITH AUTOMATED DIFF [79937 CPT(R)] FERRITIN [68049 CPT(R)] HCV FIBROSURE [52303 CPT(R)] HCV RNA BY MAMADOU QL,RFLX TO QT [50015 CPT(R)] HEP A AB, TOTAL Q1955909 CPT(R)] HEP B SURFACE AB [id number] CPT(R)] HEP B SURFACE AG [id number] CPT(R)] HEP C GENOTYPE [48466 CPT(R)] HEPATIC FUNCTION PANEL [95196 CPT(R)] HEPATITIS B CORE AB, TOTAL C8159928 CPT(R)] IRON PROFILE K120796 CPT(R)] METABOLIC PANEL, BASIC [61245 CPT(R)] PROTHROMBIN TIME + INR [30411 CPT(R)] Follow-up Instructions Return for FS/Shazia/NP. To-Do List   
 08/31/2017 Imaging:  US ABD LTD   
  
 09/26/2017 11:30 AM  
  Appointment with NITA Rojo Cera at [facility] ([phone])  
  
 09/30/2017 GI:  EGD Introducing [facility] & HEALTH SERVICES! Dear Radha Busch: Thank you for requesting a [employer] account. Our records indicate that you already have an active [employer] account. You can access your account anytime at [email or web address] [employer] Did you know that you can access your hospital and ER discharge instructions at any time in [employer]? You can also review all of your test results from your hospital stay or ER visit. Additional Information If you have questions, please visit the Frequently Asked Questions section of the [employer] website at [email or web address]. [email or web address]. [email or web address] Remember, [employer] is NOT to be used for urgent needs. For medical emergencies, dial 911. Now available from your iPhone and Android! Please provide this summary of care documentation to your next provider. Your primary care clinician is listed as Tati Ports. If you have any questions after today's visit, please call [phone].

## 2017-09-01 LAB
AFP L3 MFR SERPL: 11.2 % (ref 0–9.9)
AFP SERPL-MCNC: 13.6 NG/ML (ref 0–8)
ALBUMIN SERPL-MCNC: 3 G/DL (ref 3.6–4.8)
ALP SERPL-CCNC: 129 IU/L (ref 39–117)
ALT SERPL-CCNC: 56 IU/L (ref 0–44)
AST SERPL-CCNC: 99 IU/L (ref 0–40)
BASOPHILS # BLD AUTO: 0 X10E3/UL (ref 0–0.2)
BASOPHILS NFR BLD AUTO: 0 %
BILIRUB DIRECT SERPL-MCNC: 0.9 MG/DL (ref 0–0.4)
BILIRUB SERPL-MCNC: 1.7 MG/DL (ref 0–1.2)
BUN SERPL-MCNC: 9 MG/DL (ref 8–27)
BUN/CREAT SERPL: 13 (ref 10–24)
CALCIUM SERPL-MCNC: 8.7 MG/DL (ref 8.6–10.2)
CHLORIDE SERPL-SCNC: 99 MMOL/L (ref 96–106)
CO2 SERPL-SCNC: 27 MMOL/L (ref 18–29)
CREAT SERPL-MCNC: 0.68 MG/DL (ref 0.76–1.27)
EOSINOPHIL # BLD AUTO: 0.1 X10E3/UL (ref 0–0.4)
EOSINOPHIL NFR BLD AUTO: 2 %
ERYTHROCYTE [DISTWIDTH] IN BLOOD BY AUTOMATED COUNT: 13.1 % (ref 12.3–15.4)
FERRITIN SERPL-MCNC: 305 NG/ML (ref 30–400)
GLUCOSE SERPL-MCNC: 81 MG/DL (ref 65–99)
HAV AB SER QL IA: POSITIVE
HBV CORE AB SERPL QL IA: POSITIVE
HBV SURFACE AB SER QL: REACTIVE
HBV SURFACE AG SERPL QL IA: NEGATIVE
HCT VFR BLD AUTO: 37.4 % (ref 37.5–51)
HGB BLD-MCNC: 12.7 G/DL (ref 12.6–17.7)
IMM GRANULOCYTES # BLD: 0 X10E3/UL (ref 0–0.1)
IMM GRANULOCYTES NFR BLD: 0 %
INR PPP: 1.2 (ref 0.8–1.2)
IRON SATN MFR SERPL: 41 % (ref 15–55)
IRON SERPL-MCNC: 107 UG/DL (ref 38–169)
LYMPHOCYTES # BLD AUTO: 2.4 X10E3/UL (ref 0.7–3.1)
LYMPHOCYTES NFR BLD AUTO: 29 %
MCH RBC QN AUTO: 33 PG (ref 26.6–33)
MCHC RBC AUTO-ENTMCNC: 34 G/DL (ref 31.5–35.7)
MCV RBC AUTO: 97 FL (ref 79–97)
MONOCYTES # BLD AUTO: 1 X10E3/UL (ref 0.1–0.9)
MONOCYTES NFR BLD AUTO: 12 %
NEUTROPHILS # BLD AUTO: 4.9 X10E3/UL (ref 1.4–7)
NEUTROPHILS NFR BLD AUTO: 57 %
PLATELET # BLD AUTO: 106 X10E3/UL (ref 150–379)
POTASSIUM SERPL-SCNC: 4.2 MMOL/L (ref 3.5–5.2)
PROT SERPL-MCNC: 8.3 G/DL (ref 6–8.5)
PROTHROMBIN TIME: 12.5 SEC (ref 9.1–12)
RBC # BLD AUTO: 3.85 X10E6/UL (ref 4.14–5.8)
SODIUM SERPL-SCNC: 141 MMOL/L (ref 134–144)
TIBC SERPL-MCNC: 261 UG/DL (ref 250–450)
UIBC SERPL-MCNC: 154 UG/DL (ref 111–343)
WBC # BLD AUTO: 8.5 X10E3/UL (ref 3.4–10.8)

## 2017-09-02 LAB
A2 MACROGLOB SERPL-MCNC: 346 MG/DL (ref 110–276)
ALT SERPL W P-5'-P-CCNC: 67 IU/L (ref 0–55)
APO A-I SERPL-MCNC: 110 MG/DL (ref 101–178)
BILIRUB SERPL-MCNC: 1.5 MG/DL (ref 0–1.2)
COMMENT: ABNORMAL
FIBROSIS SCORING:, 550107: ABNORMAL
FIBROSIS STAGE SERPL QL: ABNORMAL
GGT SERPL-CCNC: 114 IU/L (ref 0–65)
HAPTOGLOB SERPL-MCNC: 87 MG/DL (ref 34–200)
INTERPRETATION, 550106: ABNORMAL
LIVER FIBR SCORE SERPL CALC.FIBROSURE: 0.9 (ref 0–0.21)
NECROINFLAMM ACTIVITY SCORING:, 550121: ABNORMAL
NECROINFLAMMATORY ACT GRADE SERPL QL: ABNORMAL
NECROINFLAMMATORY ACT SCORE SERPL: 0.63 (ref 0–0.17)
SERVICE CMNT-IMP: ABNORMAL

## 2017-09-07 ENCOUNTER — ANESTHESIA (OUTPATIENT)
Dept: ENDOSCOPY | Age: 62
End: 2017-09-07
Payer: COMMERCIAL

## 2017-09-07 ENCOUNTER — ANESTHESIA EVENT (OUTPATIENT)
Dept: ENDOSCOPY | Age: 62
End: 2017-09-07
Payer: COMMERCIAL

## 2017-09-07 ENCOUNTER — HOSPITAL ENCOUNTER (OUTPATIENT)
Age: 62
Setting detail: OUTPATIENT SURGERY
Discharge: HOME | End: 2017-09-07
Attending: INTERNAL MEDICINE | Admitting: INTERNAL MEDICINE
Payer: COMMERCIAL

## 2017-09-07 VITALS
HEART RATE: 74 BPM | TEMPERATURE: 98.6 F | BODY MASS INDEX: 46.3 KG/M2 | OXYGEN SATURATION: 96 % | WEIGHT: 295 LBS | DIASTOLIC BLOOD PRESSURE: 64 MMHG | RESPIRATION RATE: 14 BRPM | HEIGHT: 67 IN | SYSTOLIC BLOOD PRESSURE: 164 MMHG

## 2017-09-07 PROCEDURE — 76060000031 HC ANESTHESIA FIRST 0.5 HR: Performed by: INTERNAL MEDICINE

## 2017-09-07 PROCEDURE — 74011250636 HC RX REV CODE- 250/636

## 2017-09-07 PROCEDURE — 77030014243 HC BND LIG VRCES BSC -D: Performed by: INTERNAL MEDICINE

## 2017-09-07 PROCEDURE — 74011000258 HC RX REV CODE- 258

## 2017-09-07 PROCEDURE — 76040000019: Performed by: INTERNAL MEDICINE

## 2017-09-07 PROCEDURE — 74011000250 HC RX REV CODE- 250

## 2017-09-07 RX ORDER — ATROPINE SULFATE 0.1 MG/ML
0.5 INJECTION INTRAVENOUS
Status: DISCONTINUED | OUTPATIENT
Start: 2017-09-07 | End: 2017-09-07 | Stop reason: HOSPADM

## 2017-09-07 RX ORDER — SODIUM CHLORIDE 0.9 % (FLUSH) 0.9 %
5-10 SYRINGE (ML) INJECTION EVERY 8 HOURS
Status: DISCONTINUED | OUTPATIENT
Start: 2017-09-07 | End: 2017-09-07 | Stop reason: HOSPADM

## 2017-09-07 RX ORDER — FLUMAZENIL 0.1 MG/ML
0.2 INJECTION INTRAVENOUS
Status: DISCONTINUED | OUTPATIENT
Start: 2017-09-07 | End: 2017-09-07 | Stop reason: HOSPADM

## 2017-09-07 RX ORDER — LIDOCAINE HYDROCHLORIDE 20 MG/ML
INJECTION, SOLUTION EPIDURAL; INFILTRATION; INTRACAUDAL; PERINEURAL AS NEEDED
Status: DISCONTINUED | OUTPATIENT
Start: 2017-09-07 | End: 2017-09-07 | Stop reason: HOSPADM

## 2017-09-07 RX ORDER — DEXTROMETHORPHAN/PSEUDOEPHED 2.5-7.5/.8
1.2 DROPS ORAL
Status: DISCONTINUED | OUTPATIENT
Start: 2017-09-07 | End: 2017-09-07 | Stop reason: HOSPADM

## 2017-09-07 RX ORDER — FENTANYL CITRATE 50 UG/ML
50-200 INJECTION, SOLUTION INTRAMUSCULAR; INTRAVENOUS
Status: DISCONTINUED | OUTPATIENT
Start: 2017-09-07 | End: 2017-09-07 | Stop reason: HOSPADM

## 2017-09-07 RX ORDER — SODIUM CHLORIDE 9 MG/ML
INJECTION, SOLUTION INTRAVENOUS
Status: DISCONTINUED | OUTPATIENT
Start: 2017-09-07 | End: 2017-09-07 | Stop reason: HOSPADM

## 2017-09-07 RX ORDER — NALOXONE HYDROCHLORIDE 0.4 MG/ML
0.4 INJECTION, SOLUTION INTRAMUSCULAR; INTRAVENOUS; SUBCUTANEOUS
Status: DISCONTINUED | OUTPATIENT
Start: 2017-09-07 | End: 2017-09-07 | Stop reason: HOSPADM

## 2017-09-07 RX ORDER — SODIUM CHLORIDE 0.9 % (FLUSH) 0.9 %
5-10 SYRINGE (ML) INJECTION AS NEEDED
Status: DISCONTINUED | OUTPATIENT
Start: 2017-09-07 | End: 2017-09-07 | Stop reason: HOSPADM

## 2017-09-07 RX ORDER — PROPOFOL 10 MG/ML
INJECTION, EMULSION INTRAVENOUS AS NEEDED
Status: DISCONTINUED | OUTPATIENT
Start: 2017-09-07 | End: 2017-09-07 | Stop reason: HOSPADM

## 2017-09-07 RX ORDER — MIDAZOLAM HYDROCHLORIDE 1 MG/ML
5-10 INJECTION, SOLUTION INTRAMUSCULAR; INTRAVENOUS
Status: DISCONTINUED | OUTPATIENT
Start: 2017-09-07 | End: 2017-09-07 | Stop reason: HOSPADM

## 2017-09-07 RX ORDER — SODIUM CHLORIDE 9 MG/ML
50 INJECTION, SOLUTION INTRAVENOUS CONTINUOUS
Status: DISCONTINUED | OUTPATIENT
Start: 2017-09-07 | End: 2017-09-07 | Stop reason: HOSPADM

## 2017-09-07 RX ORDER — EPINEPHRINE 0.1 MG/ML
1 INJECTION INTRACARDIAC; INTRAVENOUS
Status: DISCONTINUED | OUTPATIENT
Start: 2017-09-07 | End: 2017-09-07 | Stop reason: HOSPADM

## 2017-09-07 RX ORDER — GLYCOPYRROLATE 0.2 MG/ML
INJECTION INTRAMUSCULAR; INTRAVENOUS AS NEEDED
Status: DISCONTINUED | OUTPATIENT
Start: 2017-09-07 | End: 2017-09-07 | Stop reason: HOSPADM

## 2017-09-07 RX ADMIN — PROPOFOL 100 MG: 10 INJECTION, EMULSION INTRAVENOUS at 13:07

## 2017-09-07 RX ADMIN — PROPOFOL 100 MG: 10 INJECTION, EMULSION INTRAVENOUS at 12:57

## 2017-09-07 RX ADMIN — PROPOFOL 100 MG: 10 INJECTION, EMULSION INTRAVENOUS at 12:59

## 2017-09-07 RX ADMIN — GLYCOPYRROLATE 0.2 MG: 0.2 INJECTION INTRAMUSCULAR; INTRAVENOUS at 12:51

## 2017-09-07 RX ADMIN — PROPOFOL 100 MG: 10 INJECTION, EMULSION INTRAVENOUS at 12:54

## 2017-09-07 RX ADMIN — PROPOFOL 70 MG: 10 INJECTION, EMULSION INTRAVENOUS at 12:53

## 2017-09-07 RX ADMIN — PROPOFOL 100 MG: 10 INJECTION, EMULSION INTRAVENOUS at 13:01

## 2017-09-07 RX ADMIN — PROPOFOL 100 MG: 10 INJECTION, EMULSION INTRAVENOUS at 13:03

## 2017-09-07 RX ADMIN — PROPOFOL 100 MG: 10 INJECTION, EMULSION INTRAVENOUS at 12:55

## 2017-09-07 RX ADMIN — SODIUM CHLORIDE: 9 INJECTION, SOLUTION INTRAVENOUS at 12:45

## 2017-09-07 RX ADMIN — PROPOFOL 100 MG: 10 INJECTION, EMULSION INTRAVENOUS at 13:05

## 2017-09-07 RX ADMIN — PROPOFOL 100 MG: 10 INJECTION, EMULSION INTRAVENOUS at 13:09

## 2017-09-07 RX ADMIN — LIDOCAINE HYDROCHLORIDE 100 MG: 20 INJECTION, SOLUTION EPIDURAL; INFILTRATION; INTRACAUDAL; PERINEURAL at 12:53

## 2017-09-07 RX ADMIN — PROPOFOL 30 MG: 10 INJECTION, EMULSION INTRAVENOUS at 12:56

## 2017-09-07 NOTE — ANESTHESIA POSTPROCEDURE EVALUATION
Post-Anesthesia Evaluation and Assessment    Patient: Prachi Orta MRN: [id number]  SSN: [id number]    YOB: 1955  Age: 58 y.o. Sex: male       Cardiovascular Function/Vital Signs  Visit Vitals    BP (!) 148/95    Pulse 96    Temp 37 °C (98.6 °F)    Resp 14    Ht 5' 7\" (1.702 m)    Wt 133.8 kg (295 lb)    SpO2 94%    BMI 46.2 kg/m2       Patient is status post MAC anesthesia for Procedure(s):  ESOPHAGOGASTRODUODENOSCOPY (EGD)  ENDOSCOPIC BANDING OR LIGATION. Nausea/Vomiting: None    Postoperative hydration reviewed and adequate. Pain:  Pain Scale 1: Visual (09/07/17 1321)  Pain Intensity 1: 0 (09/07/17 1321)   Managed    Neurological Status: At baseline    Mental Status and Level of Consciousness: Arousable    Pulmonary Status:   O2 Device: Room air (09/07/17 1321)   Adequate oxygenation and airway patent    Complications related to anesthesia: None    Post-anesthesia assessment completed.  No concerns    Signed By: Raquel Rahman MD     September 7, 2017

## 2017-09-07 NOTE — H&P
134 E Rebound MD Bry, [address], [facility], [facility] [place] Sanjana, NP    Kam Ndiaye, LYNN Shea, [facility] Phoenix[facility]-BC   TERRI Grant NP        at [address], [address] [facility], [address]     [phone]     FAX: [phone]    at [address], [address] [facility], [address]     [phone]     FAX: [phone]       PRE-PROCEDURE NOTE - EGD    H and P from last office visit reviewed. Allergies reviewed. Out-patient medicaton list reviewed. Patient Active Problem List   Diagnosis Code    Edema, peripheral R60.9    Substance abuse F19.10    Chronic hepatitis C (HCC) B18.2    Chronic back pain greater than 3 months duration M54.9, G89.29    Essential hypertension I10    History of heroin use Z86.59    Depression with anxiety F41.8    Erectile dysfunction N52.9    Lipidemia E78.5       No Known Allergies    No current facility-administered medications on file prior to encounter. Current Outpatient Prescriptions on File Prior to Encounter   Medication Sig Dispense Refill    diclofenac EC (VOLTAREN) 75 mg EC tablet TAKE 1 TABLET BY MOUTH TWICE DAILY AS NEEDED FOR PAIN 60 Tab 1    bumetanide (BUMEX) 2 mg tablet Take 1 Tab by mouth daily. 30 Tab 3    cloNIDine HCl (CATAPRES) 0.2 mg tablet Take 1 Tab by mouth two (2) times a day. 60 Tab 3    gabapentin (NEURONTIN) 100 mg capsule Take 1 Cap by mouth three (3) times daily. 90 Cap 3    methadone (DOLOPHINE) 10 mg tablet Take 125 mg by mouth daily. Indications: OPIOID DEPENDENCE         For EGD to assess for esophageal and gastric varices. Plan to perform banding if indicated based upon variceal size and appearance. The risks of the procedure were discussed with the patient. These included reaction to anesthesia, pain, perforation and bleeding. All questions were answered.   The patient wishes to proceed with the procedure. PHYSICAL EXAMINATION:  VS: per nursing note  General: No acute distress. Eyes: Sclera anicteric. ENT: No oral lesions. Thyroid normal.  Nodes: No adenopathy. Skin: No spider angiomata. No jaundice. No palmar erythema. Respiratory: Lungs clear to auscultation. Cardiovascular: Regular heart rate. No murmurs. No JVD. Abdomen: Soft non-tender, liver size normal to percussion/palpation. Spleen not palpable. No obvious ascites. Extremities: No edema. No muscle wasting. No gross arthritic changes. Neurologic: Alert and oriented. Cranial nerves grossly intact. No asterixis. MOST RECENT LABORATORY STUDIES:  Lab Results   Component Value Date/Time    WBC 8.5 08/31/2017 12:00 AM    HGB 12.7 08/31/2017 12:00 AM    HCT 37.4 08/31/2017 12:00 AM    PLATELET 561 21/91/6034 12:00 AM    MCV 97 08/31/2017 12:00 AM     Lab Results   Component Value Date/Time    INR 1.2 08/31/2017 12:00 AM    Prothrombin time 12.5 08/31/2017 12:00 AM       ASSESSMENT AND PLAN:  EGD to assess for esophageal and/or gastric varices. Conscious sedation with fentanyl and versed.     Joesph Dent MD  [facility] [place] [address] 22.  [phone]

## 2017-09-07 NOTE — IP AVS SNAPSHOT
27[address] [facility] [address] [place] [address] 
[phone] Patient: Vicky Garduno MRN: [id number] VFN:[id number] You are allergic to the following No active allergies Recent Documentation Height Weight BMI Smoking Status 1.702 m 133.8 kg 46.2 kg/m2 Current Some Day Smoker Emergency Contacts Name Discharge Info Relation Home Work Mobile Xochitl Torres DISCHARGE CAREGIVER [3] Spouse [3] [phone] Kayla Torres DISCHARGE CAREGIVER [3] Child [2]   [phone] About your hospitalization You were admitted on:  September 7, 2017 You last received care in the:  [facility] ENDOSCOPY You were discharged on:  September 7, 2017 Unit phone number:  [phone] Why you were hospitalized Your primary diagnosis was:  Not on File Providers Seen During Your Hospitalizations Provider Role Specialty Primary office phone Endy Lange MD Attending Provider Hepatology [phone] Your Primary Care Physician (PCP) Primary Care Physician Office Phone Office Fax Kelly Ferrer [phone] Follow-up Information Follow up With Details Comments Contact Info [address] [place] [address] Joshua Dave [address] [place] [address] 
[phone] Your Appointments Tuesday September 26, 2017 11:30 AM EDT Fibroscan with NITA Bautista 75 ([facility]) [address] [place] [address] [phone] [address] [place] [address]  
[phone] Current Discharge Medication List  
  
CONTINUE these medications which have NOT CHANGED Dose & Instructions Dispensing Information Comments Morning Noon Evening Bedtime  
 bumetanide 2 mg tablet Commonly known as:  Chaim Patron Your last dose was: Your next dose is:    
   
   
 Dose:  2 mg Take 1 Tab by mouth daily. Quantity:  30 Tab Refills:  3 cloNIDine HCl 0.2 mg tablet Commonly known as:  CATAPRES Your last dose was: Your next dose is:    
   
   
 Dose:  0.2 mg Take 1 Tab by mouth two (2) times a day. Quantity:  60 Tab Refills:  3  
     
   
   
   
  
 diclofenac EC 75 mg EC tablet Commonly known as:  VOLTAREN Your last dose was: Your next dose is: TAKE 1 TABLET BY MOUTH TWICE DAILY AS NEEDED FOR PAIN Quantity:  60 Tab Refills:  1  
     
   
   
   
  
 gabapentin 100 mg capsule Commonly known as:  NEURONTIN Your last dose was: Your next dose is:    
   
   
 Dose:  100 mg Take 1 Cap by mouth three (3) times daily. Quantity:  90 Cap Refills:  3  
     
   
   
   
  
 methadone 10 mg tablet Commonly known as:  DOLOPHINE Your last dose was: Your next dose is:    
   
   
 Dose:  125 mg Take 125 mg by mouth daily. Indications: OPIOID DEPENDENCE Refills:  0 Discharge Instructions [address] [place], [place] [address]sonido Martin MD, Ruthie Cisneros, [facility] Veronda Portugal, NP Mellissa Boas, PA-C Meche Khan, ACNP-BC   TERRI Gotti NP  
 
   at [address], [address] [place], [place] [address]yanethKarla Sutton  22. 
   [phone] FAX: [phone]    at [address], [address] [place], [address] [place] [address] [address] 
   [phone] FAX: [phone] ENDOSCOPY WITH BANDING DISCHARGE INSTRUCTIONS Abdullahi Clark 1955 Date: 9/7/2017 DISCOMFORT: 
Use lozenges or warm salt water gargle for sore thoat Apply warm compress to IV site if red. If redness or soreness persists call the office. You may experience gas and bloating. Walking and belching will help relieve this.  
You may experience chest pain or discomfort or feel as though food is \"sticking\" in your food pipe for a few days after the procedure. This is a normal feeling after banding of esophageal varices. DIET: 
Regular food may dislodge the bands placed on the varices. For this reason you should only have liquid for the rest of today. Eat only soft food that does not need to be chewed all day tomorrow. You may advance to your regular diet in 2 days. ACTIVITY: 
Spend the remainder of the day resting. Avoid any strenuous activity. You may not operate a vehicle for 12 hours. You may not engage in an occupation involving machinery or appliances for rest of today. Avoid making any critical decisions for at least 24 hour. Call the [employer]  [employer] if you have any of the following: 
Increasing chest or abdominal pain, nausea, vomiting, vomiting blood, abdominal distension or swelling, fever or chills, bloody discharge from nose or mouth or shortness of breath. Follow-up Instructions: 
Call Dr. Lisandra Sales for any questions or problems at the phone number listed above. If a biopsy was performed, you will be contacted by the office staff or Dr Lisandra Sales within 1 week. If you have not heard from us by then you may call the office at the phone number listed above to inquire about the results. ENDOSCOPY FINDINGS: 
Multiple varices were found in the esophagus (food tube). 6 bands were placed to seal the varices and reduce the risk of bleeding. DISCHARGE SUMMARY from the Nurse: The following personal items collected during your admission are returned to you:  
Dental Appliance: Dental Appliances: None Vision: Visual Aid: Glasses Hearing Aid:   
Jewelry:   
Clothing:   
Other Valuables:   
Valuables sent to safe:   
 
 
Discharge Orders None Introducing [facility] & HEALTH SERVICES! Dear Doc Fiona: Thank you for requesting a [employer] account. Our records indicate that you already have an active [employer] account.   You can access your account anytime at [email or web address] Did you know that you can access your hospital and ER discharge instructions at any time in [employer]? You can also review all of your test results from your hospital stay or ER visit. Additional Information If you have questions, please visit the Frequently Asked Questions section of the [employer] website at [email or web address] Remember, [employer] is NOT to be used for urgent needs. For medical emergencies, dial 911. Now available from your iPhone and Android! General Information Please provide this summary of care documentation to your next provider. Patient Signature:  ____________________________________________________________ Date:  ____________________________________________________________  
  
Claudene Born Provider Signature:  ____________________________________________________________ Date:  ____________________________________________________________

## 2017-09-07 NOTE — PROGRESS NOTES

## 2017-09-07 NOTE — PROCEDURES
[address] MD, Paula Paula, [place], [place]       Arturo Falcon, LYNN Mckeon, [facility]   Nate Lee, TERRI Zepeda Mt, NP        at [address], [address] [address]     [phone]     FAX: [phone]    at [address], [address] [place] [address], [address] [facility] [address]     [phone]     FAX: [phone]       UPPER ENDOSCOPY PROCEDURE NOTE    Berl Nip  1955    INDICATION: Cirrhosis. Screening for esophageal varices with variceal ligation if indicated. : Jaskaran Reyes MD    ANESTHESIA/SEDATION: Propofol was administered by anesthesia      PROCEDURE DESCRIPTION:  [employer] consent was obtained from the patient for the procedure. All risks and benefits of the procedure explained. The patient was taken to the endoscopy suite and placed in the left lateral decubitus position. Following sequential administration of sedation to doses as indicated above the endoscope was inserted into the mouth and advanced under direct vision to the second portion of the duodenum. Careful inspection of upper gastrointestinal tract was made as the endoscope was inserted and withdrawn. Retroflexion of the endoscope to view of the cardia of the stomach was performed. After withdrawing the endoscope the banding devise was placed on the tip of the endoscope. The scope was then reinserted under direct inspection and advanced to the esophagus. Banding of esophageal varices was performed as described below. The scope was then removed. FINDINGS:  Esophagus:    Multiple medium esophageal varices were identified. Banding of esophageal varices was performed. Excellent hemostasis was achieved after banding. Stomach:   Mild portal hypertensive gastropathy of the body of the stomach. No gastric varicies identified.     Duodenum: Normal bulb and second portion    INTERVENTION:   6 bands placed were placed on esophageal varices. COMPLICATIONS: None. The patient tolerated the procedure well. EBL: Negligible. RECOMMENDATIONS:  Observe until discharge parameters are achieved. Liquid diet today. Soft food tomorrow. Resume general diet thereafter. Repeat endoscopy to reassess varices and need for additional banding in 6 months. Follow-up [facility] [place] office as scheduled.       Emily Thompson MD  9/7/2017  1:10 PM

## 2017-09-07 NOTE — PROGRESS NOTES
Patient denied medication for epigastric pain. Consumed ginger ale and belching. States he will take liquid tylenol at home.

## 2017-09-07 NOTE — ANESTHESIA PREPROCEDURE EVALUATION
Anesthetic History               Review of Systems / Medical History  Patient summary reviewed, nursing notes reviewed and pertinent labs reviewed    Pulmonary          Smoker         Neuro/Psych         Psychiatric history    Comments: Former heroin user Cardiovascular    Hypertension                   GI/Hepatic/Renal       Hepatitis: type C    Liver disease     Endo/Other        Obesity     Other Findings            Physical Exam    Airway  Mallampati: II  TM Distance: > 6 cm  Neck ROM: normal range of motion   Mouth opening: Normal     Cardiovascular    Rhythm: regular  Rate: normal         Dental    Dentition: Edentulous     Pulmonary  Breath sounds clear to auscultation               Abdominal  Abdominal exam normal       Other Findings            Anesthetic Plan    ASA: 3  Anesthesia type: MAC          Induction: Intravenous  Anesthetic plan and risks discussed with: Patient

## 2017-09-07 NOTE — ROUTINE PROCESS
[address]  1955  [id number]    Situation:  Verbal report received from: Glenna Suárez  Procedure: Procedure(s):  ESOPHAGOGASTRODUODENOSCOPY (EGD)  ENDOSCOPIC BANDING OR LIGATION    Background:    Preoperative diagnosis: CHRONIC HEP C  Postoperative diagnosis: Hep C    :  Dr. Mickeal Aase  Assistant(s): Endoscopy Technician-1: Harshad Reynaga  Endoscopy RN-1: Katherine West RN    Specimens: * No specimens in log *  H. Pylori  no    Assessment:  Intra-procedure medications     Anesthesia gave intra-procedure sedation and medications, see anesthesia flow sheet yes    Intravenous fluids: NS@ KVO     Vital signs stable     Abdominal assessment: round and soft     Recommendation:  Discharge patient per MD order.   Return to floor  Family or Friend   Permission to share finding with family or friend yes

## 2017-09-07 NOTE — DISCHARGE INSTRUCTIONS
[address] MD Bry, [address], [place], [place]       TERRI Hernandez PA-C Katharine Keepers, [facility]   TERRI Cuevas NP        at [address], [address] [address]     [phone]     FAX: [phone]    at [address], [address] [facility], [address]     [phone]     FAX: [phone]       ENDOSCOPY WITH BANDING DISCHARGE INSTRUCTIONS    Ruby Torres  1955  Date: 9/7/2017    DISCOMFORT:  Use lozenges or warm salt water gargle for sore thoat  Apply warm compress to IV site if red. If redness or soreness persists call the office. You may experience gas and bloating. Walking and belching will help relieve this. You may experience chest pain or discomfort or feel as though food is \"sticking\" in your food pipe for a few days after the procedure. This is a normal feeling after banding of esophageal varices. DIET:  Regular food may dislodge the bands placed on the varices. For this reason you should only have liquid for the rest of today. Eat only soft food that does not need to be chewed all day tomorrow. You may advance to your regular diet in 2 days. ACTIVITY:  Spend the remainder of the day resting. Avoid any strenuous activity. You may not operate a vehicle for 12 hours. You may not engage in an occupation involving machinery or appliances for rest of today. Avoid making any critical decisions for at least 24 hour. Call the [address] includes the [facility] [address] of [address] if you have any of the following:  Increasing chest or abdominal pain, nausea, vomiting, vomiting blood, abdominal distension or swelling, fever or chills, bloody discharge from nose or mouth or shortness of breath. Follow-up Instructions:  Call Dr. Almaguer Signs for any questions or problems at the phone number listed above.   If a biopsy was performed, you will be contacted by the office staff or Dr Amarjit Mcdonadl within 1 week. If you have not heard from us by then you may call the office at the phone number listed above to inquire about the results. ENDOSCOPY FINDINGS:  Multiple varices were found in the esophagus (food tube). 6 bands were placed to seal the varices and reduce the risk of bleeding. DISCHARGE SUMMARY from the Nurse:   The following personal items collected during your admission are returned to you:   Dental Appliance: Dental Appliances: None  Vision: Visual Aid: Glasses  Hearing Aid:    Jewelry:    Clothing:    Other Valuables:    Valuables sent to safe:

## 2017-09-08 LAB
HCV GENTYP SERPL NAA+PROBE: NORMAL
HCV RNA SERPL NAA+PROBE-ACNC: NORMAL IU/ML
HCV RNA SERPL NAA+PROBE-LOG IU: 4.36 LOG10 IU/ML
HCV RNA SERPL QL NAA+PROBE: POSITIVE
PLEASE NOTE, 550474: NORMAL
TEST INFORMATION: NORMAL

## 2017-09-18 ENCOUNTER — HOSPITAL ENCOUNTER (OUTPATIENT)
Dept: ULTRASOUND IMAGING | Age: 62
Discharge: HOME | End: 2017-09-18
Attending: INTERNAL MEDICINE
Payer: COMMERCIAL

## 2017-09-18 DIAGNOSIS — K74.60 CIRRHOSIS OF LIVER WITHOUT ASCITES, UNSPECIFIED HEPATIC CIRRHOSIS TYPE (HCC): ICD-10-CM

## 2017-09-18 PROCEDURE — 76705 ECHO EXAM OF ABDOMEN: CPT

## 2017-09-26 ENCOUNTER — OFFICE VISIT (OUTPATIENT)
Dept: HEMATOLOGY | Age: 62
End: 2017-09-26

## 2017-09-26 VITALS
DIASTOLIC BLOOD PRESSURE: 90 MMHG | WEIGHT: 296.4 LBS | TEMPERATURE: 98.8 F | BODY MASS INDEX: 46.42 KG/M2 | HEART RATE: 84 BPM | OXYGEN SATURATION: 97 % | SYSTOLIC BLOOD PRESSURE: 134 MMHG

## 2017-09-26 DIAGNOSIS — B18.2 CHRONIC HEPATITIS C WITHOUT HEPATIC COMA (HCC): Primary | ICD-10-CM

## 2017-09-26 PROBLEM — K74.60 CIRRHOSIS (HCC): Status: ACTIVE | Noted: 2017-09-26

## 2017-09-26 RX ORDER — LACTULOSE 10 G/15ML
30 SOLUTION ORAL; RECTAL 3 TIMES DAILY
Qty: 1000 ML | Refills: 5 | Status: SHIPPED | OUTPATIENT
Start: 2017-09-26 | End: 2018-01-01

## 2017-09-26 RX ORDER — VELPATASVIR AND SOFOSBUVIR 100; 400 MG/1; MG/1
1 TABLET, FILM COATED ORAL DAILY
Qty: 28 TAB | Refills: 2 | Status: SHIPPED | OUTPATIENT
Start: 2017-09-26 | End: 2018-01-01

## 2017-09-26 NOTE — MR AVS SNAPSHOT
Visit Information Date & Time Provider Department Dept. Phone Encounter #  
 9/26/2017 11:30 AM Galina Bundy, [address] [place] [address] [id number] Upcoming Health Maintenance Date Due ZOSTER VACCINE AGE 60> 1/26/2015 FOBT Q 1 YEAR AGE 50-75 9/4/2015 INFLUENZA AGE 9 TO ADULT 8/1/2017 MEDICARE YEARLY EXAM 6/2/2018 DTaP/Tdap/Td series (2 - Td) 11/13/2023 Allergies as of 9/26/2017  Review Complete On: 9/26/2017 By: Rhiannon Chan No Known Allergies Current Immunizations  Reviewed on 9/18/2015 Name Date Tdap 11/13/2013 Not reviewed this visit Vitals BP Pulse Temp Weight(growth percentile) SpO2 BMI  
 134/90 84 98.8 °F (37.1 °C) (Tympanic) 296 lb 6.4 oz (134.4 kg) 97% 46.42 kg/m2 Smoking Status Current Some Day Smoker Vitals History BMI and BSA Data Body Mass Index Body Surface Area  
 46.42 kg/m 2 2.52 m 2 Preferred Pharmacy Pharmacy Name Phone [address]  [phone] Your Updated Medication List  
  
   
This list is accurate as of: 9/26/17 10:56 AM.  Always use your most recent med list.  
  
  
  
  
 bumetanide 2 mg tablet Commonly known as:  Rumalda Fritter Take 1 Tab by mouth daily. cloNIDine HCl 0.2 mg tablet Commonly known as:  CATAPRES Take 1 Tab by mouth two (2) times a day. diclofenac EC 75 mg EC tablet Commonly known as:  VOLTAREN  
TAKE 1 TABLET BY MOUTH TWICE DAILY AS NEEDED FOR PAIN  
  
 gabapentin 100 mg capsule Commonly known as:  NEURONTIN Take 1 Cap by mouth three (3) times daily. lactulose 10 gram/15 mL solution Commonly known as:  Cherene Colla Take 45 mL by mouth three (3) times daily. methadone 10 mg tablet Commonly known as:  DOLOPHINE Take 125 mg by mouth daily.  Indications: OPIOID DEPENDENCE  
  
  
  
  
 Prescriptions Sent to Pharmacy Refills  
 lactulose (CHRONULAC) 10 gram/15 mL solution 5 Sig: Take 45 mL by mouth three (3) times daily. Class: Normal  
 Pharmacy: [facility], [address] [place] [address] #: [phone] Route: Oral  
  
To-Do List   
 09/26/2017 11:30 AM  
  Appointment with NITA Odonnell at [address] [place] [address] of [facility] ([phone]) Introducing [facility] SERVICES! Dear Daniel England: Thank you for requesting a [employer] account. Our records indicate that you already have an active [employer] account. You can access your account anytime at [email or web address] Did you know that you can access your hospital and ER discharge instructions at any time in [employer]? You can also review all of your test results from your hospital stay or ER visit. Additional Information If you have questions, please visit the Frequently Asked Questions section of the [employer] website at [email or web address] Remember, [employer] is NOT to be used for urgent needs. For medical emergencies, dial 911. Now available from your iPhone and Android! Please provide this summary of care documentation to your next provider. Your primary care clinician is listed as Saida Britt. If you have any questions after today's visit, please call [phone].

## 2017-09-26 NOTE — PROGRESS NOTES
[address] MD, [place], [place], [place]       Judah Yanez, LYNN Mckeon, [facility]   Nate Lee, TERRI Zepeda Mt, TERRI        at [address], [address] [facility], [address]     [phone]     FAX: [phone]    at [address], [address] [place] [address], [address]     [phone]     FAX: [phone]         Patient Care Team:  Lise Berry MD as PCP - General (Family Practice)  Bjorn Edwards RN as Nurse Navigator  Neel Hubbard LPN as Nurse Navigator      Problem List  Date Reviewed: 8/31/2017          Codes Class Noted    Lipidemia ICD-10-CM: E78.5  ICD-9-CM: 272.4  1/4/2016        Depression with anxiety ICD-10-CM: F41.8  ICD-9-CM: 300.4  10/26/2015        Erectile dysfunction ICD-10-CM: N52.9  ICD-9-CM: 607.84  10/26/2015        History of heroin use ICD-10-CM: Z86.59  ICD-9-CM: V11.8  7/1/2015        Chronic back pain greater than 3 months duration ICD-10-CM: M54.9, G89.29  ICD-9-CM: 724.5, 338.29  6/5/2015        Essential hypertension ICD-10-CM: I10  ICD-9-CM: 401.9  6/5/2015        Chronic hepatitis C ([facility] ICD-10-CM: B18.2  ICD-9-CM: 070.54  5/21/2015        Edema, peripheral ICD-10-CM: R60.9  ICD-9-CM: 782.3  5/6/2011        Substance abuse ICD-10-CM: F19.10  ICD-9-CM: 305.90  5/6/2011              Berstevie Iglesias returns to the [address] for management of chronic HCV and to perform in-office fibroscan. The active problem list, all pertinent past medical history, medications, radiologic findings and laboratory findings related to the liver disorder were reviewed with the patient. The patient is a 58 y.o. Black male who was noted to have abnormalities in liver chemistries and subsequently tested positive for chronic HCV in 1990s. Risk factors for acquiring HCV are IV drug use in 1960s - 2008. There was no history of acute incteric hepatitis at the time of these risk factors. Ultrasound of the liver was performed in 2015. The results of the imaging suggested chronic liver disease. This was recently repeated 9/2017 and showed cirrhotic morphology. He underwent EGD earlier this month and has been shown to have varices, 6 bands were placed. He tolerated this procedure well. An assessment of liver fibrosis with biopsy or elastography has not been performed. This is planned for today's office visit. The patient has never received treatment for chronic HCV. The most recent laboratory studies indicate that the AST is elevated, ALT is normal, alkaline phosphatase is elevated, tests of hepatic synthetic and metabolic function are depressed, total bilirubin is elevated, INR is elevated, albumin is  depressed, and the platelet count is depressed. The patient notes fatigue, swelling of the abdomen, swelling of the lower extremities, problems concentrating. The patient has not experienced hematemesisor hematochezia. The patient has limitations in functional activities which can be attributed to the liver disease and to other medical problems that are not related to the liver disease. ALLERGIES  No Known Allergies    MEDICATIONS  Current Outpatient Prescriptions   Medication Sig    cloNIDine HCl (CATAPRES) 0.2 mg tablet Take 1 Tab by mouth two (2) times a day.  diclofenac EC (VOLTAREN) 75 mg EC tablet TAKE 1 TABLET BY MOUTH TWICE DAILY AS NEEDED FOR PAIN    bumetanide (BUMEX) 2 mg tablet Take 1 Tab by mouth daily.  gabapentin (NEURONTIN) 100 mg capsule Take 1 Cap by mouth three (3) times daily.  methadone (DOLOPHINE) 10 mg tablet Take 125 mg by mouth daily. Indications: OPIOID DEPENDENCE     No current facility-administered medications for this visit.         SYSTEM REVIEW NOT RELATED TO LIVER DISEASE OR REVIEWED ABOVE:  Constitution systems: Negative for fever, chills, weight gain, weight loss. Eyes: Negative for visual changes. ENT: Negative for sore throat, painful swallowing. Respiratory: Negative for cough, hemoptysis, SOB. Cardiology: Negative for chest pain, palpitations. GI:  Negative for constipation or diarrhea. : Negative for urinary frequency, dysuria, hematuria, nocturia. Skin: Negative for rash. Hematology: Negative for easy bruising, blood clots. Musculo-skeletal: Negative for back pain, muscle pain, weakness. Neurologic: Negative for headaches, dizziness, vertigo, memory problems not related to HE. Psychology: Negative for anxiety, depression. FAMILY HISTORY:  The father  of unknown cause. The mother  of dementia. There is no family history of liver disease. SOCIAL HISTORY:  The patient is . The patient has 3 children, and 11 grandchildren. The patient currently smokes 2 cigarettes daily. The patient has never consumed significant amounts of alcohol. The patient is currently receiving disability. PHYSICAL EXAMINATION:  Visit Vitals    /90    Pulse 84    Temp 98.8 °F (37.1 °C) (Tympanic)    Wt 296 lb 6.4 oz (134.4 kg)    SpO2 97%    BMI 46.42 kg/m2     General: No acute distress. Eyes: Sclera anicteric. ENT: No oral lesions. Thyroid normal.  Nodes: No adenopathy. Skin: No spider angiomata. No jaundice. No palmar erythema. Respiratory: Lungs clear to auscultation. Cardiovascular: Regular heart rate. No murmurs. No JVD. Abdomen: Soft non-tender. Liver size normal to percussion/palpation. Spleen not palpable. No obvious ascites. Extremities: No edema. No muscle wasting. No gross arthritic changes. Neurologic: Alert and oriented. Cranial nerves grossly intact. Fine asterixis.     LABORATORY STUDIES:  [facility] of [address] [place] [address] & Units 2017 3/21/2017 3/11/2017   WBC 3.4 - 10.8 x10E3/uL 8.5 6.3 8.9   ANC 1.4 - 7.0 x10E3/uL 4.9 2.9 6.6   HGB 12.6 - 17.7 g/dL 12.7 12.3 (L) 13.0    - 379 x10E3/uL 106 (L) 138 (L) 80 (L)   INR 0.8 - 1.2 1.2     AST 0 - 40 IU/L 99 (H)  99 (H)   ALT 0 - 44 IU/L 56 (H)  62   Alk Phos 39 - 117 IU/L 129 (H)  136 (H)   Bili, Total 0.0 - 1.2 mg/dL 1.7 (H)  1.8 (H)   Bili, Direct 0.00 - 0.40 mg/dL 0.90 (H)     Albumin 3.6 - 4.8 g/dL 3.0 (L)  2.7 (L)   BUN 8 - 27 mg/dL 9  8   Creat 0.76 - 1.27 mg/dL 0.68 (L)  0.74   Na 134 - 144 mmol/L 141  140   K 3.5 - 5.2 mmol/L 4.2  4.1   Cl 96 - 106 mmol/L 99  104   CO2 18 - 29 mmol/L 27  29   Glucose 65 - 99 mg/dL 81  71   Ammonia <32 UMOL/L        Cancer Screening Latest Ref Rng & Units 8/31/2017   AFP, Serum 0.0 - 8.0 ng/mL 13.6 (H)   AFP-L3% 0.0 - 9.9 % 11.2 (H)     SEROLOGIES:  Serologies Latest Ref Rng & Units 8/31/2017   Hep A Ab, Total Negative Positive (A)   Hep B Surface Ag Negative Negative   Hep B Core Ab, Total Negative Positive (A)   Hep B Surface AB QL  Reactive   Hep C Genotype  1b   HCV RT-PCR, Quant IU/mL 08947   Ferritin 30 - 400 ng/mL 305   Iron % Saturation 15 - 55 % 41     LIVER HISTOLOGY:  9/2017. FibroScan performed at [address] EkPa was 29.9. Suggested fibrosis level is F4. ENDOSCOPIC PROCEDURES:  9/2017. EGD performed by Dr Young Faulkner. Medium esophageal varices. Banding performed x 6. No gastric varices. Mild portal gastropathy. RADIOLOGY:  7/2015. Ultrasound of liver. Echogenic consistent with chronic liver disease. No liver mass lesions. No dilated bile ducts. No ascites. 9/2017. Ultrasound of liver. Echogenic consistent with cirrhosis. No liver mass lesions. No dilated bile ducts. No ascites. OTHER TESTING:  Not available or performed    ASSESSMENT AND PLAN:  Chronic HCV with cirrhosis. Liver function is depressed. Total bilirubin is elevated. Serum albumin is depressed. INR is prolonged. The platelet count is depressed. Based upon laboratory and imaging studies the patient appears to have cirrhosis.       Labs indicate depressed function and the presence of fluid overload and minimal HE give him a Child's Manriquez score of B. Serologic and virologic studies to assess for other causes of chronic liver disease were negative. The patient has not previously been treated for HCV. The patient has HCV genotype 1B. Given that he has decompensated liver disease he could only be treated with Harvoni or Epclusa. We have discussed administration of medication and plan to initiate therapy with Epclusa for a 12 week period of time. I have reviewed the administration and side effects of this medication with the patient and he is ready to proceed. This will be ordered. Minimal hepatic encephalopathy. Patient relates that he has had many symptoms of memory and concentration issues and reports that he often is slow to have bowel output, likely due to concomitant methadone use. I have started him on a new prescription for lactulose in an effort to induce more frequency and advised how to titrate this medication to greatest effect. The patient was counseled regarding alcohol consumption. Vaccination for viral hepatitis A and B is not needed. The patient has serologic evidence of prior exposure or vaccination with immunity. EGD to evaluate for esophageal varices and need to administer treatment to reduce risk of first variceal bleed was recently performed and 6 bands were placed. Will need to repeat EGD with possible banding in 3/2018. [facility] [address] screening will be performed. AFP was minimally elevated and will continue to be trended. US 9/2017 was without liver mass/lesion. Will consider CT for future imaging as patient's body habitus may limit full evaluation. 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 No treatment necessary. Platelet count is adequate for patient to be treated for HCV. All of the above issues were discussed with the patient. All questions were answered.   The patient expressed a clear understanding of the above. [facility] 87 within 4 weeks of initiation of HCV treatment.     Rudi Gallagher PA-C  Liver [place] of [address] [facility], [address] [facility], [facility] 22.  [phone]

## 2017-10-03 ENCOUNTER — TELEPHONE (OUTPATIENT)
Dept: HEMATOLOGY | Age: 62
End: 2017-10-03

## 2017-10-03 NOTE — TELEPHONE ENCOUNTER
Patient and wife called stating they received a phone call from \"someone from your office about needing more information for Hep C medicine. \"      Spoke with NITA Sánchez. Eladia Rodriguez stated she was working on the prior authorization for [employer]. She will speak with patient after approval.   Informed patient wife that they do not need to provide any further information.   Eladia Rodriguez will contact patient after approval.

## 2017-11-28 ENCOUNTER — TELEPHONE (OUTPATIENT)
Dept: HEMATOLOGY | Age: 62
End: 2017-11-28

## 2017-11-28 NOTE — LETTER
11/28/2017 11:47 AM 
 
Mr. Mishel Ramsay [address] [facility] [address] Dear Mishel Ramsay I have been trying to reach you in regards to your appointment on Thursday, November 30, 2017 at 12:00 with NITA Albert. The appointment has been moved to Thursday, November 30, 2017 at 11:00 am with the same provider. Please contact our office at [phone] if you have any questions or concerns. Thank you for your cooperation. Sincerely, Gladys Mack LPN

## 2017-11-28 NOTE — LETTER
11/28/2017 11:37 AM 
 
Mr. Norman Sky [address] [facility] [address] Dear Mr Renée Landers: 
 
I attempted to contact you to inform you that your appointment with Adrian Rao NP has been moved to Thursday, November 30, 2017 at 11:00am.  Please feel free to contact me if you have further questions. Sincerely, Alfredo Goldsmith LPN

## 2017-11-28 NOTE — TELEPHONE ENCOUNTER
Attempted to contact patient to inform that follow up with NITA Yepez has been moved from Thursday 11/30/17 at 12 noon to [facility] [employer] is full for home number. Cell and work numbers are no longer valid. Will send letter.

## 2017-11-30 ENCOUNTER — OFFICE VISIT (OUTPATIENT)
Dept: HEMATOLOGY | Age: 62
End: 2017-11-30

## 2017-11-30 VITALS
DIASTOLIC BLOOD PRESSURE: 78 MMHG | SYSTOLIC BLOOD PRESSURE: 128 MMHG | OXYGEN SATURATION: 92 % | TEMPERATURE: 97.9 F | BODY MASS INDEX: 48.08 KG/M2 | HEART RATE: 91 BPM | WEIGHT: 307 LBS

## 2017-11-30 PROBLEM — E66.01 OBESITY, MORBID (HCC): Status: ACTIVE | Noted: 2017-11-30

## 2018-01-01 ENCOUNTER — APPOINTMENT (OUTPATIENT)
Dept: GENERAL RADIOLOGY | Age: 63
End: 2018-01-01
Attending: NURSE PRACTITIONER
Payer: MEDICARE

## 2018-01-01 ENCOUNTER — OFFICE VISIT (OUTPATIENT)
Dept: FAMILY MEDICINE CLINIC | Age: 63
End: 2018-01-01

## 2018-01-01 ENCOUNTER — TELEPHONE (OUTPATIENT)
Dept: INTERNAL MEDICINE CLINIC | Age: 63
End: 2018-01-01

## 2018-01-01 ENCOUNTER — APPOINTMENT (OUTPATIENT)
Dept: GENERAL RADIOLOGY | Age: 63
End: 2018-01-01
Attending: EMERGENCY MEDICINE
Payer: MEDICARE

## 2018-01-01 ENCOUNTER — APPOINTMENT (OUTPATIENT)
Dept: GENERAL RADIOLOGY | Age: 63
End: 2018-01-01
Attending: PHYSICIAN ASSISTANT
Payer: MEDICARE

## 2018-01-01 ENCOUNTER — HOSPITAL ENCOUNTER (EMERGENCY)
Age: 63
Discharge: HOME | End: 2018-09-14
Attending: EMERGENCY MEDICINE | Admitting: EMERGENCY MEDICINE
Payer: MEDICARE

## 2018-01-01 ENCOUNTER — HOSPITAL ENCOUNTER (EMERGENCY)
Age: 63
Discharge: HOME | End: 2018-06-20
Attending: EMERGENCY MEDICINE
Payer: MEDICARE

## 2018-01-01 ENCOUNTER — HOSPITAL ENCOUNTER (EMERGENCY)
Age: 63
Discharge: HOME | End: 2018-07-10
Attending: EMERGENCY MEDICINE
Payer: MEDICARE

## 2018-01-01 ENCOUNTER — TELEPHONE (OUTPATIENT)
Dept: FAMILY MEDICINE CLINIC | Age: 63
End: 2018-01-01

## 2018-01-01 ENCOUNTER — HOSPITAL ENCOUNTER (OUTPATIENT)
Dept: LAB | Age: 63
Discharge: HOME | End: 2018-11-16
Payer: MEDICARE

## 2018-01-01 ENCOUNTER — OFFICE VISIT (OUTPATIENT)
Dept: HEMATOLOGY | Age: 63
End: 2018-01-01

## 2018-01-01 ENCOUNTER — HOSPITAL ENCOUNTER (EMERGENCY)
Age: 63
Discharge: HOME | End: 2018-04-21
Attending: STUDENT IN AN ORGANIZED HEALTH CARE EDUCATION/TRAINING PROGRAM
Payer: MEDICARE

## 2018-01-01 VITALS
BODY MASS INDEX: 42.97 KG/M2 | HEIGHT: 68 IN | HEART RATE: 106 BPM | DIASTOLIC BLOOD PRESSURE: 96 MMHG | RESPIRATION RATE: 17 BRPM | TEMPERATURE: 97.9 F | WEIGHT: 283.51 LBS | SYSTOLIC BLOOD PRESSURE: 153 MMHG | OXYGEN SATURATION: 96 %

## 2018-01-01 VITALS
OXYGEN SATURATION: 98 % | HEIGHT: 68 IN | TEMPERATURE: 97.7 F | DIASTOLIC BLOOD PRESSURE: 73 MMHG | SYSTOLIC BLOOD PRESSURE: 142 MMHG | HEART RATE: 113 BPM | RESPIRATION RATE: 18 BRPM | WEIGHT: 277 LBS | BODY MASS INDEX: 41.98 KG/M2

## 2018-01-01 VITALS
HEART RATE: 71 BPM | RESPIRATION RATE: 20 BRPM | OXYGEN SATURATION: 100 % | DIASTOLIC BLOOD PRESSURE: 73 MMHG | TEMPERATURE: 98 F | SYSTOLIC BLOOD PRESSURE: 139 MMHG

## 2018-01-01 VITALS
DIASTOLIC BLOOD PRESSURE: 66 MMHG | HEIGHT: 68 IN | BODY MASS INDEX: 42.37 KG/M2 | OXYGEN SATURATION: 98 % | SYSTOLIC BLOOD PRESSURE: 118 MMHG | TEMPERATURE: 96.3 F | RESPIRATION RATE: 18 BRPM | WEIGHT: 279.6 LBS | HEART RATE: 90 BPM

## 2018-01-01 VITALS
TEMPERATURE: 97.9 F | SYSTOLIC BLOOD PRESSURE: 131 MMHG | DIASTOLIC BLOOD PRESSURE: 69 MMHG | WEIGHT: 250 LBS | HEIGHT: 68 IN | RESPIRATION RATE: 16 BRPM | OXYGEN SATURATION: 97 % | HEART RATE: 72 BPM | BODY MASS INDEX: 37.89 KG/M2

## 2018-01-01 VITALS
WEIGHT: 302 LBS | BODY MASS INDEX: 47.4 KG/M2 | RESPIRATION RATE: 16 BRPM | TEMPERATURE: 97.9 F | OXYGEN SATURATION: 98 % | HEIGHT: 67 IN | SYSTOLIC BLOOD PRESSURE: 131 MMHG | HEART RATE: 79 BPM | DIASTOLIC BLOOD PRESSURE: 67 MMHG

## 2018-01-01 DIAGNOSIS — I87.2 VENOUS INSUFFICIENCY OF BOTH LOWER EXTREMITIES: ICD-10-CM

## 2018-01-01 DIAGNOSIS — R42 DIZZINESS: ICD-10-CM

## 2018-01-01 DIAGNOSIS — E87.6 HYPOKALEMIA: ICD-10-CM

## 2018-01-01 DIAGNOSIS — R60.1 GENERALIZED EDEMA: Primary | ICD-10-CM

## 2018-01-01 DIAGNOSIS — B18.2 CHRONIC HEPATITIS C WITHOUT HEPATIC COMA (HCC): ICD-10-CM

## 2018-01-01 DIAGNOSIS — F41.8 DEPRESSION WITH ANXIETY: ICD-10-CM

## 2018-01-01 DIAGNOSIS — N39.0 URINARY TRACT INFECTION WITH HEMATURIA, SITE UNSPECIFIED: ICD-10-CM

## 2018-01-01 DIAGNOSIS — I10 ESSENTIAL HYPERTENSION: ICD-10-CM

## 2018-01-01 DIAGNOSIS — K74.60 CIRRHOSIS OF LIVER WITHOUT ASCITES, UNSPECIFIED HEPATIC CIRRHOSIS TYPE (HCC): ICD-10-CM

## 2018-01-01 DIAGNOSIS — R06.02 SOB (SHORTNESS OF BREATH): ICD-10-CM

## 2018-01-01 DIAGNOSIS — L03.116 CELLULITIS OF LEFT LOWER EXTREMITY: ICD-10-CM

## 2018-01-01 DIAGNOSIS — M79.622 PAIN IN BOTH UPPER ARMS: ICD-10-CM

## 2018-01-01 DIAGNOSIS — E66.01 OBESITY, MORBID (HCC): ICD-10-CM

## 2018-01-01 DIAGNOSIS — M79.621 PAIN IN BOTH UPPER ARMS: ICD-10-CM

## 2018-01-01 DIAGNOSIS — I10 ESSENTIAL HYPERTENSION: Primary | ICD-10-CM

## 2018-01-01 DIAGNOSIS — R06.00 DYSPNEA, UNSPECIFIED TYPE: ICD-10-CM

## 2018-01-01 DIAGNOSIS — D64.9 ANEMIA, UNSPECIFIED TYPE: ICD-10-CM

## 2018-01-01 DIAGNOSIS — R60.9 PERIPHERAL EDEMA: Primary | ICD-10-CM

## 2018-01-01 DIAGNOSIS — R31.9 URINARY TRACT INFECTION WITH HEMATURIA, SITE UNSPECIFIED: ICD-10-CM

## 2018-01-01 DIAGNOSIS — R07.89 CHEST TIGHTNESS: ICD-10-CM

## 2018-01-01 DIAGNOSIS — R60.0 BILATERAL LOWER EXTREMITY EDEMA: ICD-10-CM

## 2018-01-01 DIAGNOSIS — Z12.11 COLON CANCER SCREENING: ICD-10-CM

## 2018-01-01 DIAGNOSIS — J18.9 COMMUNITY ACQUIRED PNEUMONIA OF LEFT LOWER LOBE OF LUNG: ICD-10-CM

## 2018-01-01 DIAGNOSIS — L03.115 CELLULITIS OF RIGHT LOWER EXTREMITY: ICD-10-CM

## 2018-01-01 DIAGNOSIS — Z76.89 ENCOUNTER TO ESTABLISH CARE: Primary | ICD-10-CM

## 2018-01-01 DIAGNOSIS — Z00.00 MEDICARE ANNUAL WELLNESS VISIT, SUBSEQUENT: ICD-10-CM

## 2018-01-01 DIAGNOSIS — K74.60 CIRRHOSIS OF LIVER WITHOUT ASCITES, UNSPECIFIED HEPATIC CIRRHOSIS TYPE (HCC): Primary | ICD-10-CM

## 2018-01-01 DIAGNOSIS — D69.6 THROMBOCYTOPENIA (HCC): ICD-10-CM

## 2018-01-01 DIAGNOSIS — I89.0 LYMPHEDEMA: ICD-10-CM

## 2018-01-01 LAB
ALBUMIN SERPL ELPH-MCNC: NORMAL
ALBUMIN SERPL-MCNC: 2.1 G/DL (ref 3.5–5)
ALBUMIN SERPL-MCNC: 2.3 G/DL (ref 3.5–5)
ALBUMIN SERPL-MCNC: 2.4 G/DL (ref 3.5–5)
ALBUMIN SERPL-MCNC: 2.6 G/DL (ref 3.5–5)
ALBUMIN SERPL-MCNC: 2.9 G/DL (ref 3.6–4.8)
ALBUMIN/GLOB SERPL: 0.3 {RATIO} (ref 1.1–2.2)
ALBUMIN/GLOB SERPL: 0.4 {RATIO} (ref 1.1–2.2)
ALBUMIN/GLOB SERPL: 0.6 {RATIO} (ref 1.2–2.2)
ALP SERPL-CCNC: 113 IU/L (ref 39–117)
ALP SERPL-CCNC: 116 U/L (ref 45–117)
ALP SERPL-CCNC: 123 U/L (ref 45–117)
ALP SERPL-CCNC: 149 U/L (ref 45–117)
ALP SERPL-CCNC: 151 U/L (ref 45–117)
ALPHA1 GLOB SERPL ELPH-MCNC: NORMAL
ALPHA2 GLOB SERPL ELPH-MCNC: NORMAL
ALT SERPL-CCNC: 21 IU/L (ref 0–44)
ALT SERPL-CCNC: 23 U/L (ref 12–78)
ALT SERPL-CCNC: 25 U/L (ref 12–78)
ALT SERPL-CCNC: 26 U/L (ref 12–78)
ALT SERPL-CCNC: 43 U/L (ref 12–78)
ANION GAP SERPL CALC-SCNC: 5 MMOL/L (ref 5–15)
ANION GAP SERPL CALC-SCNC: 6 MMOL/L (ref 5–15)
ANION GAP SERPL CALC-SCNC: 8 MMOL/L (ref 5–15)
ANION GAP SERPL CALC-SCNC: 9 MMOL/L (ref 5–15)
APPEARANCE UR: ABNORMAL
APPEARANCE UR: CLEAR
APPEARANCE UR: CLEAR
AST SERPL-CCNC: 46 IU/L (ref 0–40)
AST SERPL-CCNC: 55 U/L (ref 15–37)
AST SERPL-CCNC: 59 U/L (ref 15–37)
AST SERPL-CCNC: 75 U/L (ref 15–37)
AST SERPL-CCNC: 92 U/L (ref 15–37)
ATRIAL RATE: 110 BPM
ATRIAL RATE: 76 BPM
ATRIAL RATE: 78 BPM
B-GLOBULIN SERPL ELPH-MCNC: NORMAL
BACTERIA SPEC CULT: NORMAL
BACTERIA URNS QL MICRO: ABNORMAL /HPF
BACTERIA URNS QL MICRO: NEGATIVE /HPF
BACTERIA URNS QL MICRO: NEGATIVE /HPF
BASOPHILS # BLD: 0 K/UL (ref 0–0.1)
BASOPHILS NFR BLD: 0 % (ref 0–1)
BASOPHILS NFR BLD: 0 % (ref 0–1)
BASOPHILS NFR BLD: 1 % (ref 0–1)
BASOPHILS NFR BLD: 1 % (ref 0–1)
BILIRUB SERPL-MCNC: 1.1 MG/DL (ref 0.2–1)
BILIRUB SERPL-MCNC: 1.2 MG/DL (ref 0.2–1)
BILIRUB SERPL-MCNC: 1.6 MG/DL (ref 0.2–1)
BILIRUB SERPL-MCNC: 1.7 MG/DL (ref 0.2–1)
BILIRUB SERPL-MCNC: 2.5 MG/DL (ref 0–1.2)
BILIRUB UR QL CFM: NEGATIVE
BILIRUB UR QL CFM: POSITIVE
BILIRUB UR QL: NEGATIVE
BNP SERPL-MCNC: 176 PG/ML (ref 0–125)
BNP SERPL-MCNC: 186 PG/ML (ref 0–125)
BNP SERPL-MCNC: 55 PG/ML (ref 0–125)
BUN SERPL-MCNC: 10 MG/DL (ref 6–20)
BUN SERPL-MCNC: 11 MG/DL (ref 6–20)
BUN SERPL-MCNC: 12 MG/DL (ref 6–20)
BUN SERPL-MCNC: 12 MG/DL (ref 8–27)
BUN SERPL-MCNC: 9 MG/DL (ref 6–20)
BUN/CREAT SERPL: 10 (ref 12–20)
BUN/CREAT SERPL: 10 (ref 12–20)
BUN/CREAT SERPL: 11 (ref 10–24)
BUN/CREAT SERPL: 11 (ref 12–20)
BUN/CREAT SERPL: 12 (ref 12–20)
CALCIUM SERPL-MCNC: 8.3 MG/DL (ref 8.5–10.1)
CALCIUM SERPL-MCNC: 8.4 MG/DL (ref 8.5–10.1)
CALCIUM SERPL-MCNC: 8.5 MG/DL (ref 8.5–10.1)
CALCIUM SERPL-MCNC: 8.5 MG/DL (ref 8.5–10.1)
CALCIUM SERPL-MCNC: 8.6 MG/DL (ref 8.6–10.2)
CALCULATED P AXIS, ECG09: -2 DEGREES
CALCULATED P AXIS, ECG09: 48 DEGREES
CALCULATED P AXIS, ECG09: 65 DEGREES
CALCULATED R AXIS, ECG10: 36 DEGREES
CALCULATED R AXIS, ECG10: 41 DEGREES
CALCULATED R AXIS, ECG10: 67 DEGREES
CALCULATED T AXIS, ECG11: 4 DEGREES
CALCULATED T AXIS, ECG11: 55 DEGREES
CALCULATED T AXIS, ECG11: 60 DEGREES
CC UR VC: NORMAL
CHLORIDE SERPL-SCNC: 103 MMOL/L (ref 97–108)
CHLORIDE SERPL-SCNC: 103 MMOL/L (ref 97–108)
CHLORIDE SERPL-SCNC: 106 MMOL/L (ref 97–108)
CHLORIDE SERPL-SCNC: 106 MMOL/L (ref 97–108)
CHLORIDE SERPL-SCNC: 97 MMOL/L (ref 96–106)
CHOLEST SERPL-MCNC: 104 MG/DL (ref 100–199)
CK MB CFR SERPL CALC: 0.9 % (ref 0–2.5)
CK MB SERPL-MCNC: 1.1 NG/ML (ref 5–25)
CK SERPL-CCNC: 120 U/L (ref 39–308)
CO2 SERPL-SCNC: 27 MMOL/L (ref 20–29)
CO2 SERPL-SCNC: 27 MMOL/L (ref 21–32)
CO2 SERPL-SCNC: 28 MMOL/L (ref 21–32)
CO2 SERPL-SCNC: 29 MMOL/L (ref 21–32)
CO2 SERPL-SCNC: 29 MMOL/L (ref 21–32)
COLOR UR: ABNORMAL
COMMENT, HOLDF: NORMAL
CREAT SERPL-MCNC: 0.86 MG/DL (ref 0.7–1.3)
CREAT SERPL-MCNC: 0.88 MG/DL (ref 0.7–1.3)
CREAT SERPL-MCNC: 0.92 MG/DL (ref 0.7–1.3)
CREAT SERPL-MCNC: 1.05 MG/DL (ref 0.76–1.27)
CREAT SERPL-MCNC: 1.17 MG/DL (ref 0.7–1.3)
DIAGNOSIS, 93000: NORMAL
DIFFERENTIAL METHOD BLD: ABNORMAL
EOSINOPHIL # BLD: 0.1 K/UL (ref 0–0.4)
EOSINOPHIL # BLD: 0.1 K/UL (ref 0–0.4)
EOSINOPHIL # BLD: 0.2 K/UL (ref 0–0.4)
EOSINOPHIL # BLD: 0.2 K/UL (ref 0–0.4)
EOSINOPHIL NFR BLD: 2 % (ref 0–7)
EOSINOPHIL NFR BLD: 3 % (ref 0–7)
EOSINOPHIL NFR BLD: 3 % (ref 0–7)
EOSINOPHIL NFR BLD: 4 % (ref 0–7)
EPITH CASTS URNS QL MICRO: ABNORMAL /LPF
ERYTHROCYTE [DISTWIDTH] IN BLOOD BY AUTOMATED COUNT: 12.6 % (ref 11.5–14.5)
ERYTHROCYTE [DISTWIDTH] IN BLOOD BY AUTOMATED COUNT: 13.2 % (ref 11.5–14.5)
ERYTHROCYTE [DISTWIDTH] IN BLOOD BY AUTOMATED COUNT: 13.2 % (ref 11.5–14.5)
ERYTHROCYTE [DISTWIDTH] IN BLOOD BY AUTOMATED COUNT: 13.9 % (ref 11.5–14.5)
GAMMA GLOB SERPL ELPH-MCNC: NORMAL
GLOBULIN SER CALC-MCNC: 5.2 G/DL (ref 1.5–4.5)
GLOBULIN SER CALC-MCNC: 6 G/DL (ref 2–4)
GLOBULIN SER CALC-MCNC: 6.2 G/DL (ref 2–4)
GLOBULIN SER CALC-MCNC: 6.3 G/DL (ref 2–4)
GLOBULIN SER CALC-MCNC: 6.5 G/DL (ref 2–4)
GLUCOSE POC: 71 MG/DL
GLUCOSE SERPL-MCNC: 100 MG/DL (ref 65–100)
GLUCOSE SERPL-MCNC: 107 MG/DL (ref 65–100)
GLUCOSE SERPL-MCNC: 115 MG/DL (ref 65–99)
GLUCOSE SERPL-MCNC: 121 MG/DL (ref 65–100)
GLUCOSE SERPL-MCNC: 143 MG/DL (ref 65–100)
GLUCOSE UR STRIP.AUTO-MCNC: NEGATIVE MG/DL
HBA1C MFR BLD HPLC: 4.5 %
HCT VFR BLD AUTO: 33.4 % (ref 36.6–50.3)
HCT VFR BLD AUTO: 34 % (ref 36.6–50.3)
HCT VFR BLD AUTO: 36.2 % (ref 36.6–50.3)
HCT VFR BLD AUTO: 37.5 % (ref 36.6–50.3)
HDLC SERPL-MCNC: 43 MG/DL
HGB BLD-MCNC: 10.6 G/DL (ref 12.1–17)
HGB BLD-MCNC: 11.4 G/DL (ref 12.1–17)
HGB BLD-MCNC: 11.6 G/DL (ref 12.1–17)
HGB BLD-MCNC: 12.3 G/DL (ref 12.1–17)
HGB UR QL STRIP: ABNORMAL
IMM GRANULOCYTES # BLD: 0 K/UL (ref 0–0.04)
IMM GRANULOCYTES NFR BLD AUTO: 0 % (ref 0–0.5)
IMM GRANULOCYTES NFR BLD AUTO: 0 % (ref 0–0.5)
IMM GRANULOCYTES NFR BLD AUTO: 1 % (ref 0–0.5)
IMM GRANULOCYTES NFR BLD AUTO: 1 % (ref 0–0.5)
INTERPRETATION, 910389: NORMAL
KETONES UR QL STRIP.AUTO: ABNORMAL MG/DL
KETONES UR QL STRIP.AUTO: ABNORMAL MG/DL
KETONES UR QL STRIP.AUTO: NEGATIVE MG/DL
LDLC SERPL CALC-MCNC: 46 MG/DL (ref 0–99)
LEUKOCYTE ESTERASE UR QL STRIP.AUTO: ABNORMAL
LEUKOCYTE ESTERASE UR QL STRIP.AUTO: ABNORMAL
LEUKOCYTE ESTERASE UR QL STRIP.AUTO: NEGATIVE
LIPASE SERPL-CCNC: 245 U/L (ref 73–393)
LYMPHOCYTES # BLD: 1.2 K/UL (ref 0.8–3.5)
LYMPHOCYTES # BLD: 1.2 K/UL (ref 0.8–3.5)
LYMPHOCYTES # BLD: 2.1 K/UL (ref 0.8–3.5)
LYMPHOCYTES # BLD: 2.2 K/UL (ref 0.8–3.5)
LYMPHOCYTES NFR BLD: 30 % (ref 12–49)
LYMPHOCYTES NFR BLD: 30 % (ref 12–49)
LYMPHOCYTES NFR BLD: 33 % (ref 12–49)
LYMPHOCYTES NFR BLD: 37 % (ref 12–49)
MAGNESIUM SERPL-MCNC: 1.9 MG/DL (ref 1.6–2.3)
MCH RBC QN AUTO: 32.6 PG (ref 26–34)
MCH RBC QN AUTO: 32.6 PG (ref 26–34)
MCH RBC QN AUTO: 33 PG (ref 26–34)
MCH RBC QN AUTO: 33.2 PG (ref 26–34)
MCHC RBC AUTO-ENTMCNC: 31.7 G/DL (ref 30–36.5)
MCHC RBC AUTO-ENTMCNC: 32 G/DL (ref 30–36.5)
MCHC RBC AUTO-ENTMCNC: 32.8 G/DL (ref 30–36.5)
MCHC RBC AUTO-ENTMCNC: 33.5 G/DL (ref 30–36.5)
MCV RBC AUTO: 101.1 FL (ref 80–99)
MCV RBC AUTO: 102.8 FL (ref 80–99)
MCV RBC AUTO: 102.8 FL (ref 80–99)
MCV RBC AUTO: 97.1 FL (ref 80–99)
MONOCYTES # BLD: 0.4 K/UL (ref 0–1)
MONOCYTES # BLD: 0.6 K/UL (ref 0–1)
MONOCYTES # BLD: 0.9 K/UL (ref 0–1)
MONOCYTES # BLD: 1.1 K/UL (ref 0–1)
MONOCYTES NFR BLD: 12 % (ref 5–13)
MONOCYTES NFR BLD: 13 % (ref 5–13)
MONOCYTES NFR BLD: 14 % (ref 5–13)
MONOCYTES NFR BLD: 18 % (ref 5–13)
MUCOUS THREADS URNS QL MICRO: ABNORMAL /LPF
NEUTS SEG # BLD: 1.9 K/UL (ref 1.8–8)
NEUTS SEG # BLD: 2.1 K/UL (ref 1.8–8)
NEUTS SEG # BLD: 2.6 K/UL (ref 1.8–8)
NEUTS SEG # BLD: 3.7 K/UL (ref 1.8–8)
NEUTS SEG NFR BLD: 43 % (ref 32–75)
NEUTS SEG NFR BLD: 50 % (ref 32–75)
NEUTS SEG NFR BLD: 52 % (ref 32–75)
NEUTS SEG NFR BLD: 53 % (ref 32–75)
NITRITE UR QL STRIP.AUTO: NEGATIVE
NITRITE UR QL STRIP.AUTO: NEGATIVE
NITRITE UR QL STRIP.AUTO: POSITIVE
NRBC # BLD: 0 K/UL (ref 0–0.01)
NRBC BLD-RTO: 0 PER 100 WBC
P-R INTERVAL, ECG05: 176 MS
P-R INTERVAL, ECG05: 192 MS
P-R INTERVAL, ECG05: 206 MS
PH UR STRIP: 6 [PH] (ref 5–8)
PLATELET # BLD AUTO: 75 K/UL (ref 150–400)
PLATELET # BLD AUTO: 77 K/UL (ref 150–400)
PLATELET # BLD AUTO: 87 K/UL (ref 150–400)
PLATELET # BLD AUTO: 92 K/UL (ref 150–400)
PMV BLD AUTO: 12.1 FL (ref 8.9–12.9)
PMV BLD AUTO: 12.1 FL (ref 8.9–12.9)
PMV BLD AUTO: 12.6 FL (ref 8.9–12.9)
PMV BLD AUTO: 13 FL (ref 8.9–12.9)
POTASSIUM SERPL-SCNC: 2.9 MMOL/L (ref 3.5–5.1)
POTASSIUM SERPL-SCNC: 3.1 MMOL/L (ref 3.5–5.1)
POTASSIUM SERPL-SCNC: 3.4 MMOL/L (ref 3.5–5.2)
POTASSIUM SERPL-SCNC: 4.1 MMOL/L (ref 3.5–5.1)
POTASSIUM SERPL-SCNC: 4.9 MMOL/L (ref 3.5–5.1)
PROT SERPL-MCNC: 8.1 G/DL (ref 6–8.5)
PROT SERPL-MCNC: 8.3 G/DL (ref 6.4–8.2)
PROT SERPL-MCNC: 8.3 G/DL (ref 6.4–8.2)
PROT SERPL-MCNC: 8.9 G/DL (ref 6.4–8.2)
PROT SERPL-MCNC: 8.9 G/DL (ref 6.4–8.2)
PROT SERPL-MCNC: NORMAL G/DL
PROT UR STRIP-MCNC: 100 MG/DL
PROT UR STRIP-MCNC: NEGATIVE MG/DL
PROT UR STRIP-MCNC: NEGATIVE MG/DL
Q-T INTERVAL, ECG07: 356 MS
Q-T INTERVAL, ECG07: 414 MS
Q-T INTERVAL, ECG07: 418 MS
QRS DURATION, ECG06: 84 MS
QRS DURATION, ECG06: 90 MS
QRS DURATION, ECG06: 96 MS
QTC CALCULATION (BEZET), ECG08: 470 MS
QTC CALCULATION (BEZET), ECG08: 471 MS
QTC CALCULATION (BEZET), ECG08: 481 MS
RBC # BLD AUTO: 3.25 M/UL (ref 4.1–5.7)
RBC # BLD AUTO: 3.5 M/UL (ref 4.1–5.7)
RBC # BLD AUTO: 3.52 M/UL (ref 4.1–5.7)
RBC # BLD AUTO: 3.71 M/UL (ref 4.1–5.7)
RBC #/AREA URNS HPF: ABNORMAL /HPF (ref 0–5)
SAMPLES BEING HELD,HOLD: NORMAL
SERVICE CMNT-IMP: NORMAL
SODIUM SERPL-SCNC: 138 MMOL/L (ref 134–144)
SODIUM SERPL-SCNC: 138 MMOL/L (ref 136–145)
SODIUM SERPL-SCNC: 139 MMOL/L (ref 136–145)
SODIUM SERPL-SCNC: 141 MMOL/L (ref 136–145)
SODIUM SERPL-SCNC: 141 MMOL/L (ref 136–145)
SP GR UR REFRACTOMETRY: 1.02 (ref 1–1.03)
SP GR UR REFRACTOMETRY: 1.02 (ref 1–1.03)
SP GR UR REFRACTOMETRY: 1.03 (ref 1–1.03)
TRIGL SERPL-MCNC: 77 MG/DL (ref 0–149)
TROPONIN I SERPL-MCNC: <0.04 NG/ML
TROPONIN I SERPL-MCNC: <0.05 NG/ML
TROPONIN I SERPL-MCNC: <0.05 NG/ML
UA: UC IF INDICATED,UAUC: ABNORMAL
UR CULT HOLD, URHOLD: NORMAL
UR CULT HOLD, URHOLD: NORMAL
UROBILINOGEN UR QL STRIP.AUTO: 1 EU/DL (ref 0.2–1)
UROBILINOGEN UR QL STRIP.AUTO: 4 EU/DL (ref 0.2–1)
UROBILINOGEN UR QL STRIP.AUTO: 4 EU/DL (ref 0.2–1)
VENTRICULAR RATE, ECG03: 110 BPM
VENTRICULAR RATE, ECG03: 76 BPM
VENTRICULAR RATE, ECG03: 78 BPM
VLDLC SERPL CALC-MCNC: 15 MG/DL (ref 5–40)
WBC # BLD AUTO: 3.7 K/UL (ref 4.1–11.1)
WBC # BLD AUTO: 4.1 K/UL (ref 4.1–11.1)
WBC # BLD AUTO: 6 K/UL (ref 4.1–11.1)
WBC # BLD AUTO: 7 K/UL (ref 4.1–11.1)
WBC URNS QL MICRO: ABNORMAL /HPF (ref 0–4)
YEAST BUDDING URNS QL: PRESENT

## 2018-01-01 PROCEDURE — 74011250637 HC RX REV CODE- 250/637: Performed by: EMERGENCY MEDICINE

## 2018-01-01 PROCEDURE — 83880 ASSAY OF NATRIURETIC PEPTIDE: CPT | Performed by: NURSE PRACTITIONER

## 2018-01-01 PROCEDURE — 93005 ELECTROCARDIOGRAM TRACING: CPT

## 2018-01-01 PROCEDURE — 36415 COLL VENOUS BLD VENIPUNCTURE: CPT | Performed by: PHYSICIAN ASSISTANT

## 2018-01-01 PROCEDURE — 81001 URINALYSIS AUTO W/SCOPE: CPT | Performed by: NURSE PRACTITIONER

## 2018-01-01 PROCEDURE — 36415 COLL VENOUS BLD VENIPUNCTURE: CPT | Performed by: EMERGENCY MEDICINE

## 2018-01-01 PROCEDURE — 99283 EMERGENCY DEPT VISIT LOW MDM: CPT

## 2018-01-01 PROCEDURE — 87086 URINE CULTURE/COLONY COUNT: CPT | Performed by: PHYSICIAN ASSISTANT

## 2018-01-01 PROCEDURE — 36415 COLL VENOUS BLD VENIPUNCTURE: CPT

## 2018-01-01 PROCEDURE — 71046 X-RAY EXAM CHEST 2 VIEWS: CPT

## 2018-01-01 PROCEDURE — 80061 LIPID PANEL: CPT

## 2018-01-01 PROCEDURE — 74011250637 HC RX REV CODE- 250/637: Performed by: STUDENT IN AN ORGANIZED HEALTH CARE EDUCATION/TRAINING PROGRAM

## 2018-01-01 PROCEDURE — 85025 COMPLETE CBC W/AUTO DIFF WBC: CPT | Performed by: NURSE PRACTITIONER

## 2018-01-01 PROCEDURE — 80053 COMPREHEN METABOLIC PANEL: CPT | Performed by: PHYSICIAN ASSISTANT

## 2018-01-01 PROCEDURE — 99284 EMERGENCY DEPT VISIT MOD MDM: CPT

## 2018-01-01 PROCEDURE — 99285 EMERGENCY DEPT VISIT HI MDM: CPT

## 2018-01-01 PROCEDURE — 81001 URINALYSIS AUTO W/SCOPE: CPT | Performed by: EMERGENCY MEDICINE

## 2018-01-01 PROCEDURE — 36600 WITHDRAWAL OF ARTERIAL BLOOD: CPT

## 2018-01-01 PROCEDURE — 85025 COMPLETE CBC W/AUTO DIFF WBC: CPT | Performed by: PHYSICIAN ASSISTANT

## 2018-01-01 PROCEDURE — 84165 PROTEIN E-PHORESIS SERUM: CPT | Performed by: EMERGENCY MEDICINE

## 2018-01-01 PROCEDURE — 82550 ASSAY OF CK (CPK): CPT | Performed by: NURSE PRACTITIONER

## 2018-01-01 PROCEDURE — 83880 ASSAY OF NATRIURETIC PEPTIDE: CPT | Performed by: PHYSICIAN ASSISTANT

## 2018-01-01 PROCEDURE — 84484 ASSAY OF TROPONIN QUANT: CPT | Performed by: PHYSICIAN ASSISTANT

## 2018-01-01 PROCEDURE — 93005 ELECTROCARDIOGRAM TRACING: CPT | Performed by: PHYSICIAN ASSISTANT

## 2018-01-01 PROCEDURE — 80053 COMPREHEN METABOLIC PANEL: CPT

## 2018-01-01 PROCEDURE — 83690 ASSAY OF LIPASE: CPT | Performed by: NURSE PRACTITIONER

## 2018-01-01 PROCEDURE — 84484 ASSAY OF TROPONIN QUANT: CPT | Performed by: NURSE PRACTITIONER

## 2018-01-01 PROCEDURE — 80053 COMPREHEN METABOLIC PANEL: CPT | Performed by: NURSE PRACTITIONER

## 2018-01-01 PROCEDURE — 80053 COMPREHEN METABOLIC PANEL: CPT | Performed by: EMERGENCY MEDICINE

## 2018-01-01 PROCEDURE — 93970 EXTREMITY STUDY: CPT

## 2018-01-01 PROCEDURE — 74011250636 HC RX REV CODE- 250/636: Performed by: EMERGENCY MEDICINE

## 2018-01-01 PROCEDURE — 81001 URINALYSIS AUTO W/SCOPE: CPT | Performed by: PHYSICIAN ASSISTANT

## 2018-01-01 PROCEDURE — 83735 ASSAY OF MAGNESIUM: CPT

## 2018-01-01 PROCEDURE — 36415 COLL VENOUS BLD VENIPUNCTURE: CPT | Performed by: NURSE PRACTITIONER

## 2018-01-01 PROCEDURE — 85025 COMPLETE CBC W/AUTO DIFF WBC: CPT | Performed by: EMERGENCY MEDICINE

## 2018-01-01 PROCEDURE — 96374 THER/PROPH/DIAG INJ IV PUSH: CPT

## 2018-01-01 PROCEDURE — 74011250637 HC RX REV CODE- 250/637: Performed by: PHYSICIAN ASSISTANT

## 2018-01-01 RX ORDER — CIPROFLOXACIN 500 MG/1
500 TABLET ORAL 2 TIMES DAILY
Qty: 14 TAB | Refills: 0 | Status: SHIPPED | OUTPATIENT
Start: 2018-01-01 | End: 2018-01-01

## 2018-01-01 RX ORDER — POTASSIUM CHLORIDE 750 MG/1
40 TABLET, FILM COATED, EXTENDED RELEASE ORAL
Status: COMPLETED | OUTPATIENT
Start: 2018-01-01 | End: 2018-01-01

## 2018-01-01 RX ORDER — CLONIDINE HYDROCHLORIDE 0.2 MG/1
TABLET ORAL
Qty: 180 TAB | Refills: 2 | Status: SHIPPED | OUTPATIENT
Start: 2018-01-01

## 2018-01-01 RX ORDER — TRIAMCINOLONE ACETONIDE 1 MG/ML
LOTION TOPICAL 3 TIMES DAILY
Qty: 60 ML | Refills: 0 | Status: SHIPPED | OUTPATIENT
Start: 2018-01-01 | End: 2018-01-01 | Stop reason: ALTCHOICE

## 2018-01-01 RX ORDER — BUMETANIDE 2 MG/1
2 TABLET ORAL DAILY
Qty: 30 TAB | Refills: 5 | Status: SHIPPED | OUTPATIENT
Start: 2018-01-01 | End: 2018-01-01 | Stop reason: SDUPTHER

## 2018-01-01 RX ORDER — CEPHALEXIN 500 MG/1
500 CAPSULE ORAL 3 TIMES DAILY
Qty: 30 CAP | Refills: 0 | Status: SHIPPED | OUTPATIENT
Start: 2018-01-01 | End: 2018-01-01

## 2018-01-01 RX ORDER — SODIUM CHLORIDE 0.9 % (FLUSH) 0.9 %
5-10 SYRINGE (ML) INJECTION EVERY 8 HOURS
Status: DISCONTINUED | OUTPATIENT
Start: 2018-01-01 | End: 2018-01-01 | Stop reason: HOSPADM

## 2018-01-01 RX ORDER — CLONIDINE HYDROCHLORIDE 0.1 MG/1
TABLET ORAL
Qty: 180 TAB | Refills: 0 | Status: SHIPPED | OUTPATIENT
Start: 2018-01-01 | End: 2018-01-01 | Stop reason: SDUPTHER

## 2018-01-01 RX ORDER — POTASSIUM CHLORIDE 1500 MG/1
20 TABLET, FILM COATED, EXTENDED RELEASE ORAL DAILY
Qty: 90 TAB | Refills: 3 | Status: ON HOLD | OUTPATIENT
Start: 2018-01-01 | End: 2019-01-01 | Stop reason: SDUPTHER

## 2018-01-01 RX ORDER — HYDROCODONE BITARTRATE AND ACETAMINOPHEN 7.5; 325 MG/1; MG/1
1 TABLET ORAL
Qty: 10 TAB | Refills: 0 | Status: SHIPPED | OUTPATIENT
Start: 2018-01-01 | End: 2018-01-01 | Stop reason: ALTCHOICE

## 2018-01-01 RX ORDER — AZITHROMYCIN 250 MG/1
TABLET, FILM COATED ORAL
Qty: 6 TAB | Refills: 0 | Status: SHIPPED | OUTPATIENT
Start: 2018-01-01 | End: 2018-01-01

## 2018-01-01 RX ORDER — POTASSIUM CHLORIDE 750 MG/1
10 TABLET, FILM COATED, EXTENDED RELEASE ORAL DAILY
Qty: 7 TAB | Refills: 0 | Status: SHIPPED | OUTPATIENT
Start: 2018-01-01 | End: 2018-01-01 | Stop reason: SDUPTHER

## 2018-01-01 RX ORDER — SODIUM CHLORIDE 0.9 % (FLUSH) 0.9 %
5-10 SYRINGE (ML) INJECTION AS NEEDED
Status: DISCONTINUED | OUTPATIENT
Start: 2018-01-01 | End: 2018-01-01 | Stop reason: HOSPADM

## 2018-01-01 RX ORDER — GABAPENTIN 100 MG/1
100 CAPSULE ORAL 2 TIMES DAILY
Qty: 60 CAP | Refills: 1 | Status: SHIPPED | OUTPATIENT
Start: 2018-01-01

## 2018-01-01 RX ORDER — FUROSEMIDE 10 MG/ML
40 INJECTION INTRAMUSCULAR; INTRAVENOUS
Status: COMPLETED | OUTPATIENT
Start: 2018-01-01 | End: 2018-01-01

## 2018-01-01 RX ORDER — HYDROCODONE BITARTRATE AND ACETAMINOPHEN 7.5; 325 MG/1; MG/1
1 TABLET ORAL
Status: COMPLETED | OUTPATIENT
Start: 2018-01-01 | End: 2018-01-01

## 2018-01-01 RX ORDER — BUMETANIDE 0.25 MG/ML
2 INJECTION INTRAMUSCULAR; INTRAVENOUS
Status: DISCONTINUED | OUTPATIENT
Start: 2018-01-01 | End: 2018-01-01 | Stop reason: HOSPADM

## 2018-01-01 RX ORDER — CLONIDINE HYDROCHLORIDE 0.1 MG/1
0.1 TABLET ORAL 2 TIMES DAILY
Qty: 60 TAB | Refills: 5 | Status: SHIPPED | OUTPATIENT
Start: 2018-01-01 | End: 2018-01-01 | Stop reason: SDUPTHER

## 2018-01-01 RX ORDER — BUMETANIDE 1 MG/1
2 TABLET ORAL
Status: COMPLETED | OUTPATIENT
Start: 2018-01-01 | End: 2018-01-01

## 2018-01-01 RX ORDER — POTASSIUM CHLORIDE 750 MG/1
10 TABLET, FILM COATED, EXTENDED RELEASE ORAL DAILY
Qty: 7 TAB | Refills: 0 | Status: SHIPPED | OUTPATIENT
Start: 2018-01-01 | End: 2018-01-01

## 2018-01-01 RX ORDER — BUMETANIDE 1 MG/1
2 TABLET ORAL DAILY
Status: DISCONTINUED | OUTPATIENT
Start: 2018-01-01 | End: 2018-01-01

## 2018-01-01 RX ORDER — BUMETANIDE 2 MG/1
2 TABLET ORAL DAILY
Qty: 7 TAB | Refills: 0 | Status: SHIPPED | OUTPATIENT
Start: 2018-01-01 | End: 2018-01-01 | Stop reason: SDUPTHER

## 2018-01-01 RX ORDER — FUROSEMIDE 40 MG/1
40 TABLET ORAL DAILY
Qty: 7 TAB | Refills: 0 | Status: SHIPPED | OUTPATIENT
Start: 2018-01-01 | End: 2018-01-01

## 2018-01-01 RX ADMIN — HYDROCODONE BITARTRATE AND ACETAMINOPHEN 1 TABLET: 7.5; 325 TABLET ORAL at 18:06

## 2018-01-01 RX ADMIN — FUROSEMIDE 40 MG: 10 INJECTION, SOLUTION INTRAMUSCULAR; INTRAVENOUS at 14:22

## 2018-01-01 RX ADMIN — BUMETANIDE 2 MG: 1 TABLET ORAL at 22:07

## 2018-01-01 RX ADMIN — POTASSIUM CHLORIDE 40 MEQ: 750 TABLET, FILM COATED, EXTENDED RELEASE ORAL at 20:03

## 2018-01-01 RX ADMIN — POTASSIUM CHLORIDE 40 MEQ: 750 TABLET, FILM COATED, EXTENDED RELEASE ORAL at 22:06

## 2018-01-24 ENCOUNTER — OFFICE VISIT (OUTPATIENT)
Dept: HEMATOLOGY | Age: 63
End: 2018-01-24

## 2018-01-24 VITALS
WEIGHT: 304 LBS | TEMPERATURE: 98.2 F | SYSTOLIC BLOOD PRESSURE: 148 MMHG | HEART RATE: 82 BPM | OXYGEN SATURATION: 93 % | BODY MASS INDEX: 47.61 KG/M2 | DIASTOLIC BLOOD PRESSURE: 99 MMHG

## 2018-01-24 DIAGNOSIS — K74.60 CIRRHOSIS OF LIVER WITHOUT ASCITES, UNSPECIFIED HEPATIC CIRRHOSIS TYPE (HCC): ICD-10-CM

## 2018-01-24 DIAGNOSIS — B18.2 CHRONIC HEPATITIS C WITHOUT HEPATIC COMA (HCC): Primary | ICD-10-CM

## 2018-01-24 NOTE — MR AVS SNAPSHOT
[address] [facility] [phone] [address] 
[phone] Patient: Janice Tenorio MRN: [id number] ONW:[birth year] Visit Information Date & Time Provider Department Dept. Phone Encounter #  
 1/24/2018  8:30 AM Elmer Lema, [address] [place] [address] [id number] Upcoming Health Maintenance Date Due ZOSTER VACCINE AGE 60> 1/26/2015 FOBT Q 1 YEAR AGE 50-75 9/4/2015 Influenza Age 5 to Adult 8/1/2017 MEDICARE YEARLY EXAM 6/2/2018 DTaP/Tdap/Td series (2 - Td) 11/13/2023 Allergies as of 1/24/2018  Review Complete On: 1/24/2018 By: Amberly Mckeon No Known Allergies Current Immunizations  Reviewed on 9/18/2015 Name Date Tdap 11/13/2013 Not reviewed this visit You Were Diagnosed With   
  
 Codes Comments Chronic hepatitis C without hepatic coma (HCC)    -  Primary ICD-10-CM: B18.2 ICD-9-CM: 070.54 Cirrhosis of liver without ascites, unspecified hepatic cirrhosis type ([facility]     ICD-10-CM: K74.60 ICD-9-CM: 571.5 Vitals BP Pulse Temp Weight(growth percentile) SpO2 BMI  
 (!) 148/99 (BP 1 Location: Left arm, BP Patient Position: Sitting) 82 98.2 °F (36.8 °C) (Tympanic) 304 lb (137.9 kg) 93% 47.61 kg/m2 Smoking Status Current Some Day Smoker Vitals History BMI and BSA Data Body Mass Index Body Surface Area  
 47.61 kg/m 2 2.55 m 2 Preferred Pharmacy Pharmacy Name Phone [address], [address] [phone] Your Updated Medication List  
  
   
This list is accurate as of: 1/24/18 12:15 PM.  Always use your most recent med list.  
  
  
  
  
 bumetanide 2 mg tablet Commonly known as:  Maria Teresa Rain Take 1 Tab by mouth daily. cloNIDine HCl 0.2 mg tablet Commonly known as:  CATAPRES Take 1 Tab by mouth two (2) times a day. diclofenac EC 75 mg EC tablet Commonly known as:  VOLTAREN  
TAKE 1 TABLET BY MOUTH TWICE DAILY AS NEEDED FOR PAIN  
  
 gabapentin 100 mg capsule Commonly known as:  NEURONTIN Take 1 Cap by mouth three (3) times daily. lactulose 10 gram/15 mL solution Commonly known as:  Leila Sessions Take 45 mL by mouth three (3) times daily. methadone 10 mg tablet Commonly known as:  DOLOPHINE Take 125 mg by mouth daily. Indications: OPIOID DEPENDENCE  
  
 sofosbuvir-velpatasvir 400-100 mg Tab Commonly known as:  Alona Ian Take 1 Tab by mouth daily. We Performed the Following AFP WITH AFP-L3% [ZBF16304 Custom] CBC W/O DIFF [46897 CPT(R)] HCV RNA BY MAMADUO QL,RFLX TO QT [12279 CPT(R)] HEPATIC FUNCTION PANEL (6) [[id number] Custom] METABOLIC PANEL, BASIC [44008 CPT(R)] PROTHROMBIN TIME + INR [05458 CPT(R)] To-Do List   
 03/24/2018 Imaging:  US ABD COMP Introducing [facility]! Dear Ivan Berry: Thank you for requesting a [employer] account. Our records indicate that you already have an active Wallet[employer] account. You can access your account anytime at [email or web address] Did you know that you can access your hospital and ER discharge instructions at any time in [employer]? You can also review all of your test results from your hospital stay or ER visit. Additional Information If you have questions, please visit the Frequently Asked Questions section of the Wallet[employer] website at [email or web address] Remember, WalletKit is NOT to be used for urgent needs. For medical emergencies, dial 911. Now available from your iPhone and Android! Please provide this summary of care documentation to your next provider. Your primary care clinician is listed as [place] [facility]. If you have any questions after today's visit, please call [phone].

## 2018-01-24 NOTE — PROGRESS NOTES
1. Have you been to the ER, urgent care clinic since your last visit? Hospitalized since your last visit? No    2. Have you seen or consulted any other health care providers outside of the [address] [place] [address] since your last visit? Include any pap smears or colon screening.  No   Chief Complaint   Patient presents with    Follow-up     4 week f/u      Visit Vitals    BP (!) 148/99 (BP 1 Location: Left arm, BP Patient Position: Sitting)    Pulse 82    Temp 98.2 °F (36.8 °C) (Tympanic)    Wt 304 lb (137.9 kg)    SpO2 93%    BMI 47.61 kg/m2     Learning Assessment 1/24/2018   PRIMARY LEARNER Patient   HIGHEST LEVEL OF EDUCATION - PRIMARY LEARNER  -   BARRIERS PRIMARY LEARNER -   CO-LEARNER CAREGIVER -   PRIMARY LANGUAGE ENGLISH   LEARNER PREFERENCE PRIMARY LISTENING   ANSWERED BY patient   RELATIONSHIP SELF

## 2018-01-24 NOTE — PROGRESS NOTES
[address], [address], [place], [place]       TERRI Griffiths Doctor, LYNN Quintero, SHIRAP-BC   TERRI Loyd NP        at [address]     [address] [place] [address], [address] [address]     [phone]     FAX: [phone]    at [facility], [address] [place] [address], [address]     [phone]     FAX: [phone]         Patient Care Team:  Dina Miranda MD as PCP - General (Family Practice)  Marquita Dawson RN as Nurse Navigator  Robin Snyder LPN as Nurse Navigator      Problem List  Date Reviewed: 9/26/2017          Codes Class Noted    Obesity, morbid [facility]) ICD-10-CM: E66.01  ICD-9-CM: 278.01  11/30/2017        Cirrhosis ([facility] ICD-10-CM: K74.60  ICD-9-CM: 571.5  9/26/2017        Lipidemia ICD-10-CM: E78.5  ICD-9-CM: 272.4  1/4/2016        Depression with anxiety ICD-10-CM: F41.8  ICD-9-CM: 300.4  10/26/2015        Erectile dysfunction ICD-10-CM: N52.9  ICD-9-CM: 607.84  10/26/2015        History of heroin use ICD-10-CM: Z86.59  ICD-9-CM: V11.8  7/1/2015        Chronic back pain greater than 3 months duration ICD-10-CM: M54.9, G89.29  ICD-9-CM: 724.5, 338.29  6/5/2015        Essential hypertension ICD-10-CM: I10  ICD-9-CM: 401.9  6/5/2015        Chronic hepatitis C ([facility] ICD-10-CM: B18.2  ICD-9-CM: 070.54  5/21/2015        Edema, peripheral ICD-10-CM: R60.9  ICD-9-CM: 782.3  5/6/2011        Substance abuse ICD-10-CM: F19.10  ICD-9-CM: 305.90  5/6/2011              Lois Francisco returns to the [facility] for management of chronic HCV, he is presently on medical therapy. The active problem list, all pertinent past medical history, medications, radiologic findings and laboratory findings related to the liver disorder were reviewed with the patient. The patient is a 58 y.o.  Black male who was noted to have abnormalities in liver chemistries and subsequently tested positive for chronic HCV in 1990s. Risk factors for acquiring HCV are IV drug use in 1960s - 2008. There was no history of acute incteric hepatitis at the time of these risk factors. Ultrasound of the liver was performed in 2015. The results of the imaging suggested chronic liver disease. This was recently repeated 9/2017 and showed cirrhotic morphology. He underwent EGD in 9/2017 and has been shown to have varices, 6 bands were placed. He tolerated this procedure well. An assessment of liver fibrosis with elastography has been performed prior to the start of therapy. This showed a score of 299.9 EkPa, consistent with cirrhosis. Davion Nath presents to the office for follow-up of chronic hepatitis C therapy. He has completed 4 week(s) of an intended 12 weeks of Epclusa. Patient has tolerated therapy reasonably well. There has not been missed medications. He presents today to monitor response to therapy. The most recent laboratory studies indicate that the AST is elevated, ALT is normal, alkaline phosphatase is elevated, tests of hepatic synthetic and metabolic function are depressed, total bilirubin is elevated, INR is elevated, albumin is  depressed, and the platelet count is depressed. The patient notes fatigue, swelling of the abdomen, swelling of the lower extremities, problems concentrating. The patient has not experienced hematemesisor hematochezia. The patient has limitations in functional activities which can be attributed to the liver disease and to other medical problems that are not related to the liver disease. He has noted some improvement in energy since the start of medications. ALLERGIES  No Known Allergies    MEDICATIONS  Current Outpatient Prescriptions   Medication Sig    lactulose (CHRONULAC) 10 gram/15 mL solution Take 45 mL by mouth three (3) times daily.  (Patient taking differently: Take 30 g by mouth three (3) times daily. Patient only takes on )    sofosbuvir-velpatasvir (EPCLUSA) 400-100 mg tab Take 1 Tab by mouth daily.  diclofenac EC (VOLTAREN) 75 mg EC tablet TAKE 1 TABLET BY MOUTH TWICE DAILY AS NEEDED FOR PAIN    bumetanide (BUMEX) 2 mg tablet Take 1 Tab by mouth daily.  cloNIDine HCl (CATAPRES) 0.2 mg tablet Take 1 Tab by mouth two (2) times a day.  gabapentin (NEURONTIN) 100 mg capsule Take 1 Cap by mouth three (3) times daily.  methadone (DOLOPHINE) 10 mg tablet Take 125 mg by mouth daily. Indications: OPIOID DEPENDENCE     No current facility-administered medications for this visit. SYSTEM REVIEW NOT RELATED TO LIVER DISEASE OR REVIEWED ABOVE:  Constitution systems: Negative for fever, chills, weight gain, weight loss. Eyes: Negative for visual changes. ENT: Negative for sore throat, painful swallowing. Respiratory: Negative for cough, hemoptysis, SOB. Cardiology: Negative for chest pain, palpitations. GI:  Negative for constipation or diarrhea. : Negative for urinary frequency, dysuria, hematuria, nocturia. Skin: Negative for rash. Hematology: Negative for easy bruising, blood clots. Musculo-skeletal: Negative for back pain, muscle pain, weakness. Neurologic: Negative for headaches, dizziness, vertigo, memory problems not related to HE. Psychology: Negative for anxiety, depression. FAMILY HISTORY:  The father  of unknown cause. The mother  of dementia. There is no family history of liver disease. SOCIAL HISTORY:  The patient is . The patient has 3 children, and 11 grandchildren. The patient currently smokes 2 cigarettes daily. The patient has never consumed significant amounts of alcohol. The patient is currently receiving disability.       PHYSICAL EXAMINATION:  Visit Vitals    BP (!) 148/99 (BP 1 Location: Left arm, BP Patient Position: Sitting)    Pulse 82    Temp 98.2 °F (36.8 °C) (Tympanic)    Wt 304 lb (137.9 kg)    SpO2 93%    BMI 47.61 kg/m2     General: No acute distress. Eyes: Sclera anicteric. ENT: No oral lesions. Thyroid normal.  Nodes: No adenopathy. Skin: No spider angiomata. No jaundice. No palmar erythema. Respiratory: Lungs clear to auscultation. Cardiovascular: Regular heart rate. No murmurs. No JVD. Abdomen: Soft non-tender. Liver size normal to percussion/palpation. Spleen not palpable. No obvious ascites. Extremities: No edema. No muscle wasting. No gross arthritic changes. Neurologic: Alert and oriented. Cranial nerves grossly intact. Fine asterixis. LABORATORY STUDIES:  [facility] [place] of [address] Units 8/31/2017 3/21/2017 3/11/2017   WBC 3.4 - 10.8 x10E3/uL 8.5 6.3 8.9   ANC 1.4 - 7.0 x10E3/uL 4.9 2.9 6.6   HGB 12.6 - 17.7 g/dL 12.7 12.3 (L) 13.0    - 379 x10E3/uL 106 (L) 138 (L) 80 (L)   INR 0.8 - 1.2 1.2     AST 0 - 40 IU/L 99 (H)  99 (H)   ALT 0 - 44 IU/L 56 (H)  62   Alk Phos 39 - 117 IU/L 129 (H)  136 (H)   Bili, Total 0.0 - 1.2 mg/dL 1.7 (H)  1.8 (H)   Bili, Direct 0.00 - 0.40 mg/dL 0.90 (H)     Albumin 3.6 - 4.8 g/dL 3.0 (L)  2.7 (L)   BUN 8 - 27 mg/dL 9  8   Creat 0.76 - 1.27 mg/dL 0.68 (L)  0.74   Na 134 - 144 mmol/L 141  140   K 3.5 - 5.2 mmol/L 4.2  4.1   Cl 96 - 106 mmol/L 99  104   CO2 18 - 29 mmol/L 27  29   Glucose 65 - 99 mg/dL 81  71   Ammonia <32 UMOL/L        Cancer Screening Latest Ref Rng & Units 8/31/2017   AFP, Serum 0.0 - 8.0 ng/mL 13.6 (H)   AFP-L3% 0.0 - 9.9 % 11.2 (H)   Additional lab values drawn at today's office visit are pending at the time of documentation.     SEROLOGIES:  Serologies Latest Ref Rng & Units 8/31/2017   Hep A Ab, Total Negative Positive (A)   Hep B Surface Ag Negative Negative   Hep B Core Ab, Total Negative Positive (A)   Hep B Surface AB QL  Reactive   Hep C Genotype  1b   HCV RT-PCR, Quant IU/mL 83309   Ferritin 30 - 400 ng/mL 305   Iron % Saturation 15 - 55 % 41     LIVER HISTOLOGY:  9/2017. FibroScan performed at [address]. EkPa was 29.9. Suggested fibrosis level is F4. ENDOSCOPIC PROCEDURES:  9/2017. EGD performed by Dr Rodríguez Martin. Medium esophageal varices. Banding performed x 6. No gastric varices. Mild portal gastropathy. RADIOLOGY:  7/2015. Ultrasound of liver. Echogenic consistent with chronic liver disease. No liver mass lesions. No dilated bile ducts. No ascites. 9/2017. Ultrasound of liver. Echogenic consistent with cirrhosis. No liver mass lesions. No dilated bile ducts. No ascites. OTHER TESTING:  Not available or performed    ASSESSMENT AND PLAN:  Chronic hepatitis C, genotype 1b, in the setting of cirrhosis. Serjio Wells is tolerating medication for treatment of HCV well and has completed 4 week(s). Side effects of the current oral treatment have been minimal and he is doing well on medication without missed dosing. I have reviewed with him our plan to document his on-treatment response to medications, this will be repeated in 2 months at the conclusion of treatment and we will continue to monitor for complications of liver disease. Plan to continue daily oral medication for the entire prescribed length of therapy. I have reinforced the importance of adherence to treatment and encouraged the patient to contact this office if new or worsening side effects develop. Lab values today for monitoring purposes will include basic metabolic panel, hepatic function panel, CBC, and HCV RNA. Serjio Wells has cirrhosis secondary to HCV. He is in need of surveillance screening for [address] an ultrasound will be scheduled in conjunction with his next office visit in 2 months. Labs indicate depressed function and the presence of fluid overload and minimal HE give him a Child's Manriquez score of B. Minimal hepatic encephalopathy. Improved in recent months.  He is taking lactulose on a regular basis with increased bowel output in an effort to induce more frequency. The patient was counseled regarding alcohol consumption. Vaccination for viral hepatitis A and B is not needed. The patient has serologic evidence of prior exposure or vaccination with immunity. EGD to evaluate for esophageal varices and need to administer treatment to reduce risk of first variceal bleed was recently performed and 6 bands were placed. Will need to repeat EGD with possible banding in 3/2018. This has been ordered as well. 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 No treatment necessary. Platelet count is adequate for patient to be treated for HCV. All of the above issues were discussed with the patient. All questions were answered. The patient expressed a clear understanding of the above. Follow-up [address] in 2 months at the conclusion of HCV treatment with same day US. EGD at that time as well.     Charis Snowden PA-C  [facility] [place] [address], [address] [address]  [phone]

## 2018-01-25 LAB
AFP L3 MFR SERPL: 10.6 % (ref 0–9.9)
AFP SERPL-MCNC: 13.7 NG/ML (ref 0–8)
ALBUMIN SERPL-MCNC: 2.8 G/DL (ref 3.6–4.8)
ALP SERPL-CCNC: 148 IU/L (ref 39–117)
ALT SERPL-CCNC: 20 IU/L (ref 0–44)
AST SERPL-CCNC: 47 IU/L (ref 0–40)
BILIRUB DIRECT SERPL-MCNC: 0.67 MG/DL (ref 0–0.4)
BILIRUB SERPL-MCNC: 1.4 MG/DL (ref 0–1.2)
BUN SERPL-MCNC: 6 MG/DL (ref 8–27)
BUN/CREAT SERPL: 8 (ref 10–24)
CALCIUM SERPL-MCNC: 8.1 MG/DL (ref 8.6–10.2)
CHLORIDE SERPL-SCNC: 100 MMOL/L (ref 96–106)
CO2 SERPL-SCNC: 26 MMOL/L (ref 18–29)
CREAT SERPL-MCNC: 0.73 MG/DL (ref 0.76–1.27)
GFR SERPLBLD CREATININE-BSD FMLA CKD-EPI: 115 ML/MIN/1.73
GFR SERPLBLD CREATININE-BSD FMLA CKD-EPI: 99 ML/MIN/1.73
GLUCOSE SERPL-MCNC: 86 MG/DL (ref 65–99)
HCT VFR BLD AUTO: NORMAL %
HGB BLD-MCNC: NORMAL G/DL
INR PPP: 1.1 (ref 0.8–1.2)
NRBC BLD AUTO-RTO: NORMAL %
PLATELET # BLD AUTO: NORMAL 10*3/UL
POTASSIUM SERPL-SCNC: 3.6 MMOL/L (ref 3.5–5.2)
PROTHROMBIN TIME: 12 SEC (ref 9.1–12)
RBC # BLD AUTO: NORMAL 10*6/UL
SODIUM SERPL-SCNC: 140 MMOL/L (ref 134–144)
WBC # BLD AUTO: NORMAL X10E3/UL

## 2018-01-26 LAB — HCV RNA SERPL QL NAA+PROBE: NEGATIVE

## 2018-01-29 NOTE — PROGRESS NOTES
Unable to reach patient as VM full. Letter sent, continue with medications as prescribed through end of 12 weeks.

## 2018-03-23 NOTE — PROGRESS NOTES
1. Have you been to the ER, urgent care clinic since your last visit? Hospitalized since your last visit? No    2. Have you seen or consulted any other health care providers outside of the [address] [place] [address] since your last visit? Include any pap smears or colon screening. No   Chief Complaint   Patient presents with    Follow-up     Visit Vitals    /54 (BP 1 Location: Left arm, BP Patient Position: Sitting)    Pulse 87    Temp 98.2 °F (36.8 °C) (Tympanic)    Wt 302 lb (137 kg)    SpO2 95%    BMI 47.3 kg/m2     PHQ over the last two weeks 8/11/2016   PHQ Not Done -   Little interest or pleasure in doing things Nearly every day   Feeling down, depressed or hopeless Several days   Total Score PHQ 2 -   Trouble falling or staying asleep, or sleeping too much Nearly every day   Feeling tired or having little energy Nearly every day   Poor appetite or overeating Nearly every day   Feeling bad about yourself - or that you are a failure or have let yourself or your family down Several days   Moving or speaking so slowly that other people could have noticed; or the opposite being so fidgety that others notice Not at all   Thoughts of being better off dead, or hurting yourself in some way Not at all   How difficult have these problems made it for you to do your work, take care of your home and get along with others Somewhat difficult     Abuse Screening Questionnaire 3/23/2018   Do you ever feel afraid of your partner? N   Are you in a relationship with someone who physically or mentally threatens you? N   Is it safe for you to go home?  Y     Learning Assessment 3/23/2018   PRIMARY LEARNER Patient   HIGHEST LEVEL OF EDUCATION - PRIMARY LEARNER  -   BARRIERS PRIMARY LEARNER NONE   CO-LEARNER CAREGIVER No   PRIMARY LANGUAGE ENGLISH   LEARNER PREFERENCE PRIMARY LISTENING   ANSWERED BY patient    RELATIONSHIP SELF

## 2018-04-21 NOTE — ED NOTES
7:47 PM  I have evaluated the patient as the Provider in Triage. I have reviewed His vital signs and the triage nurse assessment. I have talked with the patient and any available family and advised that I am the provider in triage and have ordered the appropriate study to initiate their work up based on the clinical presentation during my assessment. I have advised that the patient will be accommodated in the Main ED as soon as possible. I have also requested to contact the triage nurse or myself immediately if the patient experiences any changes in their condition during this brief waiting period. Pt here for swelling in the feet and hands.  + SOB.  + recent orthopnea. No hx CHF.     NITA Varma

## 2018-04-21 NOTE — ED TRIAGE NOTES
Bilateral hand and feet edema with SOB and dizziness onset 4 days ago. Pt denies CP. Pt reports falling 4 days ago d/t dizziness. Denies LOC or head injury.

## 2018-04-22 NOTE — DISCHARGE INSTRUCTIONS
Leg and Ankle Edema: Care Instructions  Your Care Instructions  Swelling in the legs, ankles, and feet is called edema. It is common after you sit or stand for a while. Long plane flights or car rides often cause swelling in the legs and feet. You may also have swelling if you have to stand for long periods of time at your job. Problems with the veins in the legs (varicose veins) and changes in hormones can also cause swelling. Sometimes the swelling in the ankles and feet is caused by a more serious problem, such as heart failure, infection, blood clots, or liver or kidney disease. Follow-up care is a key part of your treatment and safety. Be sure to make and go to all appointments, and call your doctor if you are having problems. It's also a good idea to know your test results and keep a list of the medicines you take. How can you care for yourself at home? · If your doctor gave you medicine, take it as prescribed. Call your doctor if you think you are having a problem with your medicine. · Whenever you are resting, raise your legs up. Try to keep the swollen area higher than the level of your heart. · Take breaks from standing or sitting in one position. ¨ Walk around to increase the blood flow in your lower legs. ¨ Move your feet and ankles often while you stand, or tighten and relax your leg muscles. · Wear support stockings. Put them on in the morning, before swelling gets worse. · Eat a balanced diet. Lose weight if you need to. · Limit the amount of salt (sodium) in your diet. Salt holds fluid in the body and may increase swelling. When should you call for help? Call 911 anytime you think you may need emergency care. For example, call if:  ? · You have symptoms of a blood clot in your lung (called a pulmonary embolism). These may include:  ¨ Sudden chest pain. ¨ Trouble breathing. ¨ Coughing up blood.    ?Call your doctor now or seek immediate medical care if:  ? · You have signs of a blood clot, such as:  ¨ Pain in your calf, back of the knee, thigh, or groin. ¨ Redness and swelling in your leg or groin. ? · You have symptoms of infection, such as:  ¨ Increased pain, swelling, warmth, or redness. ¨ Red streaks or pus. ¨ A fever. ? Watch closely for changes in your health, and be sure to contact your doctor if:  ? · Your swelling is getting worse. ? · You have new or worsening pain in your legs. ? · You do not get better as expected. Where can you learn more? Go to http://candice-adri.info/. Enter N472 in the search box to learn more about \"Leg and Ankle Edema: Care Instructions. \"  Current as of: March 20, 2017  Content Version: 11.4  © 8103-4330 [employer]. Care instructions adapted under license by [employer] (which disclaims liability or warranty for this information). If you have questions about a medical condition or this instruction, always ask your healthcare professional. [place] [address] any warranty or liability for your use of this information. Pneumonia: Care Instructions  Your Care Instructions    Pneumonia is an infection of the lungs. Most cases are caused by infections from bacteria or viruses. Pneumonia may be mild or very severe. If it is caused by bacteria, you will be treated with antibiotics. It may take a few weeks to a few months to recover fully from pneumonia, depending on how sick you were and whether your overall health is good. Follow-up care is a key part of your treatment and safety. Be sure to make and go to all appointments, and call your doctor if you are having problems. It's also a good idea to know your test results and keep a list of the medicines you take. How can you care for yourself at home? · Take your antibiotics exactly as directed. Do not stop taking the medicine just because you are feeling better. You need to take the full course of antibiotics.   · Take your medicines exactly as prescribed. Call your doctor if you think you are having a problem with your medicine. · Get plenty of rest and sleep. You may feel weak and tired for a while, but your energy level will improve with time. · To prevent dehydration, drink plenty of fluids, enough so that your urine is light yellow or clear like water. Choose water and other caffeine-free clear liquids until you feel better. If you have kidney, heart, or liver disease and have to limit fluids, talk with your doctor before you increase the amount of fluids you drink. · Take care of your cough so you can rest. A cough that brings up mucus from your lungs is common with pneumonia. It is one way your body gets rid of the infection. But if coughing keeps you from resting or causes severe fatigue and chest-wall pain, talk to your doctor. He or she may suggest that you take a medicine to reduce the cough. · Use a vaporizer or humidifier to add moisture to your bedroom. Follow the directions for cleaning the machine. · Do not smoke or allow others to smoke around you. Smoke will make your cough last longer. If you need help quitting, talk to your doctor about stop-smoking programs and medicines. These can increase your chances of quitting for good. · Take an over-the-counter pain medicine, such as acetaminophen (Tylenol), ibuprofen (Advil, Motrin), or naproxen (Aleve). Read and follow all instructions on the label. · Do not take two or more pain medicines at the same time unless the doctor told you to. Many pain medicines have acetaminophen, which is Tylenol. Too much acetaminophen (Tylenol) can be harmful. · If you were given a spirometer to measure how well your lungs are working, use it as instructed. This can help your doctor tell how your recovery is going. · To prevent pneumonia in the future, talk to your doctor about getting a flu vaccine (once a year) and a pneumococcal vaccine (one time only for most people).   When should you call for help? Call 911 anytime you think you may need emergency care. For example, call if:  ? · You have severe trouble breathing. ?Call your doctor now or seek immediate medical care if:  ? · You cough up dark brown or bloody mucus (sputum). ? · You have new or worse trouble breathing. ? · You are dizzy or lightheaded, or you feel like you may faint. ? Watch closely for changes in your health, and be sure to contact your doctor if:  ? · You have a new or higher fever. ? · You are coughing more deeply or more often. ? · You are not getting better after 2 days (48 hours). ? · You do not get better as expected. Where can you learn more? Go to http://candice-dari.info/. Enter [id number] in the search box to learn more about \"Pneumonia: Care Instructions. \"  Current as of: May 12, 2017  Content Version: 11.4  © 6899-5569 [employer]. Care instructions adapted under license by [employer] (which disclaims liability or warranty for this information). If you have questions about a medical condition or this instruction, always ask your healthcare professional. [address] any warranty or liability for your use of this information.

## 2018-04-22 NOTE — ED NOTES
Discharge instructions given to patient by MD and nurse. Pt has been given counseling on medication use and verbalizes understanding. Pt wheelchaired off of unit in no signs of distress.
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
## 2018-06-20 NOTE — ED NOTES
Bedside and Verbal shift change report given to me (oncoming nurse) by Candy Castillo (offgoing nurse). Report included the following information SBAR, Kardex and ED Summary.
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
## 2018-06-20 NOTE — ED NOTES
Patient with hx of cirrhosis presents to ED with concerns of worsening ERIC lower extremity swelling and pain. States chronic FVE but worse in the past four days. Wife at bedside. patient demonstrates somewhat delayed speech but wife indicates speech within patient's baseline. Emergency Department Nursing Plan of Care       The Nursing Plan of Care is developed from the Nursing assessment and Emergency Department Attending provider initial evaluation. The plan of care may be reviewed in the ED Provider note.     The Plan of Care was developed with the following considerations:   Patient / Family readiness to learn indicated by:verbalized understanding  Persons(s) to be included in education: patient  Barriers to Learning/Limitations:No    Signed     Cate Arteaga RN    6/20/2018   5:34 PM

## 2018-06-20 NOTE — ED TRIAGE NOTES
Pt presents to ED with c/o ERCI lower extremity swelling x4 days. Denies fevers or chills. Denies cough with productive sputum. Takes medications for HTN.
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
## 2018-06-21 NOTE — ED NOTES
Pt continues to rest quietly in bed in position of comfort. Updated on plan of care. Call bell within reach. Provided pt with additional water and encouraged to void.

## 2018-06-21 NOTE — DISCHARGE INSTRUCTIONS
Hypokalemia: Care Instructions  Your Care Instructions    Hypokalemia (say \"ia-vp-ayy-ROBBI-ninfa-uh\") is a low level of potassium. The heart, muscles, kidneys, and nervous system all need potassium to work well. This problem has many different causes. Kidney problems, diet, and medicines like diuretics and laxatives can cause it. So can vomiting or diarrhea. In some cases, cancer is the cause. Your doctor may do tests to find the cause of your low potassium levels. You may need medicines to bring your potassium levels back to normal. You may also need regular blood tests to check your potassium. If you have very low potassium, you may need intravenous (IV) medicines. You also may need tests to check the electrical activity of your heart. Heart problems caused by low potassium levels can be very serious. Follow-up care is a key part of your treatment and safety. Be sure to make and go to all appointments, and call your doctor if you are having problems. It's also a good idea to know your test results and keep a list of the medicines you take. How can you care for yourself at home? · If your doctor recommends it, eat foods that have a lot of potassium. These include fresh fruits, juices, and vegetables. They also include nuts, beans, and milk. · Be safe with medicines. If your doctor prescribes medicines or potassium supplements, take them exactly as directed. Call your doctor if you have any problems with your medicines. · Get your potassium levels tested as often as your doctor tells you. When should you call for help? Call 911 anytime you think you may need emergency care. For example, call if:  ? · You feel like your heart is missing beats. Heart problems caused by low potassium can cause death. ? · You passed out (lost consciousness). ? · You have a seizure. ?Call your doctor now or seek immediate medical care if:  ? · You feel weak or unusually tired. ? · You have severe arm or leg cramps. ? · You have tingling or numbness. ? · You feel sick to your stomach, or you vomit. ? · You have belly cramps. ? · You feel bloated or constipated. ? · You have to urinate a lot. ? · You feel very thirsty most of the time. ? · You are dizzy or lightheaded, or you feel like you may faint. ? · You feel depressed, or you lose touch with reality. ? Watch closely for changes in your health, and be sure to contact your doctor if:  ? · You do not get better as expected. Where can you learn more? Go to http://candice-adri.info/. Enter G358 in the search box to learn more about \"Hypokalemia: Care Instructions. \"  Current as of: May 12, 2017  Content Version: 11.4  © 7277-2289 [employer]. Care instructions adapted under license by [employer] (which disclaims liability or warranty for this information). If you have questions about a medical condition or this instruction, always ask your healthcare professional. [address] any warranty or liability for your use of this information. Leg and Ankle Edema: Care Instructions  Your Care Instructions  Swelling in the legs, ankles, and feet is called edema. It is common after you sit or stand for a while. Long plane flights or car rides often cause swelling in the legs and feet. You may also have swelling if you have to stand for long periods of time at your job. Problems with the veins in the legs (varicose veins) and changes in hormones can also cause swelling. Sometimes the swelling in the ankles and feet is caused by a more serious problem, such as heart failure, infection, blood clots, or liver or kidney disease. Follow-up care is a key part of your treatment and safety. Be sure to make and go to all appointments, and call your doctor if you are having problems. It's also a good idea to know your test results and keep a list of the medicines you take.   How can you care for yourself at home?  · If your doctor gave you medicine, take it as prescribed. Call your doctor if you think you are having a problem with your medicine. · Whenever you are resting, raise your legs up. Try to keep the swollen area higher than the level of your heart. · Take breaks from standing or sitting in one position. ¨ Walk around to increase the blood flow in your lower legs. ¨ Move your feet and ankles often while you stand, or tighten and relax your leg muscles. · Wear support stockings. Put them on in the morning, before swelling gets worse. · Eat a balanced diet. Lose weight if you need to. · Limit the amount of salt (sodium) in your diet. Salt holds fluid in the body and may increase swelling. When should you call for help? Call 911 anytime you think you may need emergency care. For example, call if:  ? · You have symptoms of a blood clot in your lung (called a pulmonary embolism). These may include:  ¨ Sudden chest pain. ¨ Trouble breathing. ¨ Coughing up blood. ?Call your doctor now or seek immediate medical care if:  ? · You have signs of a blood clot, such as:  ¨ Pain in your calf, back of the knee, thigh, or groin. ¨ Redness and swelling in your leg or groin. ? · You have symptoms of infection, such as:  ¨ Increased pain, swelling, warmth, or redness. ¨ Red streaks or pus. ¨ A fever. ? Watch closely for changes in your health, and be sure to contact your doctor if:  ? · Your swelling is getting worse. ? · You have new or worsening pain in your legs. ? · You do not get better as expected. Where can you learn more? Go to http://candice-adri.info/. Enter T815 in the search box to learn more about \"Leg and Ankle Edema: Care Instructions. \"  Current as of: March 20, 2017  Content Version: 11.4  © 6668-2560 [employer]. Care instructions adapted under license by [employer] (which disclaims liability or warranty for this information).  If you have questions about a medical condition or this instruction, always ask your healthcare professional. [address] any warranty or liability for your use of this information. Urinary Tract Infections in Men: Care Instructions  Your Care Instructions    A urinary tract infection, or UTI, is a general term for an infection anywhere between the kidneys and the tip of the penis. UTIs can also be a result of a prostate problem. Most cause pain or burning when you urinate. Most UTIs are caused by bacteria and can be cured with antibiotics. It is important to complete your treatment so that the infection does not get worse. Follow-up care is a key part of your treatment and safety. Be sure to make and go to all appointments, and call your doctor if you are having problems. It's also a good idea to know your test results and keep a list of the medicines you take. How can you care for yourself at home? · Take your antibiotics as prescribed. Do not stop taking them just because you feel better. You need to take the full course of antibiotics. · Take your medicines exactly as prescribed. Your doctor may have prescribed a medicine, such as phenazopyridine (Pyridium), to help relieve pain when you urinate. This turns your urine orange. You may stop taking it when your symptoms get better. But be sure to take all of your antibiotics, which treat the infection. · Drink extra water for the next day or two. This will help make the urine less concentrated and help wash out the bacteria causing the infection. (If you have kidney, heart, or liver disease and have to limit your fluids, talk with your doctor before you increase your fluid intake.)  · Avoid drinks that are carbonated or have caffeine. They can irritate the bladder. · Urinate often. Try to empty your bladder each time. · To relieve pain, take a hot bath or lay a heating pad (set on low) over your lower belly or genital area.  Never go to sleep with a heating pad in place. To help prevent UTIs  · Drink plenty of fluids, enough so that your urine is light yellow or clear like water. If you have kidney, heart, or liver disease and have to limit fluids, talk with your doctor before you increase the amount of fluids you drink. · Urinate when you have the urge. Do not hold your urine for a long time. Urinate before you go to sleep. · Keep your penis clean. Catheter care  If you have a drainage tube (catheter) in place, the following steps will help you care for it. · Always wash your hands before and after touching your catheter. · Check the area around the urethra for inflammation or signs of infection. Signs of infection include irritated, swollen, red, or tender skin, or pus around the catheter. · Clean the area around the catheter with soap and water two times a day. Dry with a clean towel afterward. · Do not apply powder or lotion to the skin around the catheter. To empty the urine collection bag  · Wash your hands with soap and water. · Without touching the drain spout, remove the spout from its sleeve at the bottom of the collection bag. Open the valve on the spout. · Let the urine flow out of the bag and into the toilet or a container. Do not let the tubing or drain spout touch anything. · After you empty the bag, clean the end of the drain spout with tissue and water. Close the valve and put the drain spout back into its sleeve at the bottom of the collection bag. · Wash your hands with soap and water. When should you call for help? Call your doctor now or seek immediate medical care if:  ? · Symptoms such as a fever, chills, nausea, or vomiting get worse or happen for the first time. ? · You have new pain in your back just below your rib cage. This is called flank pain. ? · There is new blood or pus in your urine. ? · You are not able to take or keep down your antibiotics. ? Watch closely for changes in your health, and be sure to contact your doctor if:  ? · You are not getting better after taking an antibiotic for 2 days. ? · Your symptoms go away but then come back. Where can you learn more? Go to http://candice-adri.info/. Enter N829 in the search box to learn more about \"Urinary Tract Infections in Men: Care Instructions. \"  Current as of: May 12, 2017  Content Version: 11.4  © 2575-7637 [employer]. Care instructions adapted under license by [employer] (which disclaims liability or warranty for this information). If you have questions about a medical condition or this instruction, always ask your healthcare professional. [address] any warranty or liability for your use of this information.

## 2018-07-10 NOTE — DISCHARGE INSTRUCTIONS
Cellulitis: Care Instructions  Your Care Instructions    Cellulitis is a skin infection caused by bacteria, most often strep or staph. It often occurs after a break in the skin from a scrape, cut, bite, or puncture, or after a rash. Cellulitis may be treated without doing tests to find out what caused it. But your doctor may do tests, if needed, to look for a specific bacteria, like methicillin-resistant Staphylococcus aureus (MRSA). The doctor has checked you carefully, but problems can develop later. If you notice any problems or new symptoms, get medical treatment right away. Follow-up care is a key part of your treatment and safety. Be sure to make and go to all appointments, and call your doctor if you are having problems. It's also a good idea to know your test results and keep a list of the medicines you take. How can you care for yourself at home? · Take your antibiotics as directed. Do not stop taking them just because you feel better. You need to take the full course of antibiotics. · Prop up the infected area on pillows to reduce pain and swelling. Try to keep the area above the level of your heart as often as you can. · If your doctor told you how to care for your wound, follow your doctor's instructions. If you did not get instructions, follow this general advice:  ¨ Wash the wound with clean water 2 times a day. Don't use hydrogen peroxide or alcohol, which can slow healing. ¨ You may cover the wound with a thin layer of petroleum jelly, such as Vaseline, and a nonstick bandage. ¨ Apply more petroleum jelly and replace the bandage as needed. · Be safe with medicines. Take pain medicines exactly as directed. ¨ If the doctor gave you a prescription medicine for pain, take it as prescribed. ¨ If you are not taking a prescription pain medicine, ask your doctor if you can take an over-the-counter medicine.   To prevent cellulitis in the future  · Try to prevent cuts, scrapes, or other injuries to your skin. Cellulitis most often occurs where there is a break in the skin. · If you get a scrape, cut, mild burn, or bite, wash the wound with clean water as soon as you can to help avoid infection. Don't use hydrogen peroxide or alcohol, which can slow healing. · If you have swelling in your legs (edema), support stockings and good skin care may help prevent leg sores and cellulitis. · Take care of your feet, especially if you have diabetes or other conditions that increase the risk of infection. Wear shoes and socks. Do not go barefoot. If you have athlete's foot or other skin problems on your feet, talk to your doctor about how to treat them. When should you call for help? Call your doctor now or seek immediate medical care if:    · You have signs that your infection is getting worse, such as:  ¨ Increased pain, swelling, warmth, or redness. ¨ Red streaks leading from the area. ¨ Pus draining from the area. ¨ A fever.     · You get a rash.    Watch closely for changes in your health, and be sure to contact your doctor if:    · You do not get better as expected. Where can you learn more? Go to http://candice-adri.info/. Chicho Preciado in the search box to learn more about \"Cellulitis: Care Instructions. \"  Current as of: May 10, 2017  Content Version: 11.7  © 8754-6725 [employer]. Care instructions adapted under license by [employer] (which disclaims liability or warranty for this information). If you have questions about a medical condition or this instruction, always ask your healthcare professional. [place] [address] any warranty or liability for your use of this information.

## 2018-07-10 NOTE — ED NOTES
4:33 PM  I have evaluated the patient as the Provider in Triage. I have reviewed His vital signs and the triage nurse assessment. I have talked with the patient and any available family and advised that I am the provider in triage and have ordered the appropriate study to initiate their work up based on the clinical presentation during my assessment. I have advised that the patient will be accommodated in the Main ED as soon as possible. I have also requested to contact the triage nurse or myself immediately if the patient experiences any changes in their condition during this brief waiting period. Deider Lang PA-C    Increasing bilateral leg swelling over past week. Sob.  On fluid pill
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
## 2018-07-10 NOTE — ED TRIAGE NOTES
TRIAGE NOTE:  Patient arrives with c/o bilateral ankle swelling and pain x 1 month. Patient reports taking tylenol and advil, last dose was advil at midnight. Patient reports no relief. Patient reports taking bumex 2 days ago.

## 2018-07-11 NOTE — ROUTINE PROCESS
MD reviewed discharge instructions with the patient, who verbalized understanding and denied having any further questions.
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
## 2018-09-14 NOTE — ED TRIAGE NOTES
Pt reports left chest pain that radiates into left arm and left neck with nausea. Pt states the pain began 25 days ago. Pt also reports bilateral foot swelling. Pt denies vomiting, diarrhea or diaphoresis.

## 2018-09-14 NOTE — ED NOTES
Patient verbalizes understanding of discharge instructions. Opportunity for questions provided. Patient in no apparent distress, VSS. Patient ambulatory upon discharge. Visit Vitals  /69 (BP 1 Location: Right arm, BP Patient Position: At rest)  Pulse 72  Temp 97.9 °F (36.6 °C)  Resp 16  
 Ht 5' 8\" (1.727 m)  Wt 113.4 kg (250 lb)  SpO2 97%  BMI 38.01 kg/m2

## 2018-09-14 NOTE — ED NOTES
11:07 AM 
I have evaluated the patient as the Provider in Triage. I have reviewed His vital signs and the triage nurse assessment. I have talked with the patient and any available family and advised that I am the provider in triage and have ordered the appropriate study to initiate their work up based on the clinical presentation during my assessment. I have advised that the patient will be accommodated in the Main ED as soon as possible. I have also requested to contact the triage nurse or myself immediately if the patient experiences any changes in their condition during this brief waiting period. Malina Toth NP 
 
11:07 AM 
C/o chest pain for two days. States associated dizziness, positive nausea, no vomiting. Pain in the left jaw and neck. Also c/o feet swelling \" for a long time. \"  Taking no medications due to [facility]. \" Lizeth Wagner, DNP

## 2018-09-14 NOTE — ED NOTES
Patient ambulatory with RN to Core ER room, patient reports slight dizziness. Patient changed into gown and placed on monitor x3.

## 2018-09-14 NOTE — DISCHARGE INSTRUCTIONS
Shortness of Breath: Care Instructions  Your Care Instructions  Shortness of breath has many causes. Sometimes conditions such as anxiety can lead to shortness of breath. Some people get mild shortness of breath when they exercise. Trouble breathing also can be a symptom of a serious problem, such as asthma, lung disease, emphysema, heart problems, and pneumonia. If your shortness of breath continues, you may need tests and treatment. Watch for any changes in your breathing and other symptoms. Follow-up care is a key part of your treatment and safety. Be sure to make and go to all appointments, and call your doctor if you are having problems. It's also a good idea to know your test results and keep a list of the medicines you take. How can you care for yourself at home? · Do not smoke or allow others to smoke around you. If you need help quitting, talk to your doctor about stop-smoking programs and medicines. These can increase your chances of quitting for good. · Get plenty of rest and sleep. · Take your medicines exactly as prescribed. Call your doctor if you think you are having a problem with your medicine. · Find healthy ways to deal with stress. ¨ Exercise daily. ¨ Get plenty of sleep. ¨ Eat regularly and well. When should you call for help? Call 911 anytime you think you may need emergency care. For example, call if:    · You have severe shortness of breath.     · You have symptoms of a heart attack. These may include:  ¨ Chest pain or pressure, or a strange feeling in the chest.  ¨ Sweating. ¨ Shortness of breath. ¨ Nausea or vomiting. ¨ Pain, pressure, or a strange feeling in the back, neck, jaw, or upper belly or in one or both shoulders or arms. ¨ Lightheadedness or sudden weakness. ¨ A fast or irregular heartbeat. After you call 911, the  may tell you to chew 1 adult-strength or 2 to 4 low-dose aspirin. Wait for an ambulance.  Do not try to drive yourself.    Call your doctor now or seek immediate medical care if:    · Your shortness of breath gets worse or you start to wheeze. Wheezing is a high-pitched sound when you breathe.     · You wake up at night out of breath or have to prop your head up on several pillows to breathe.     · You are short of breath after only light activity or while at rest.    Watch closely for changes in your health, and be sure to contact your doctor if:    · You do not get better over the next 1 to 2 days. Where can you learn more? Go to http://candice-adri.info/. Enter S780 in the search box to learn more about \"Shortness of Breath: Care Instructions. \"  Current as of: December 6, 2017  Content Version: 11.7  © 2375-5845 [employer]. Care instructions adapted under license by [employer] (which disclaims liability or warranty for this information). If you have questions about a medical condition or this instruction, always ask your healthcare professional. [place] [address] any warranty or liability for your use of this information. We hope that we have addressed all of your medical concerns. The examination and treatment you received in the Emergency Department were for an emergent problem and were not intended as complete care. It is important that you follow up with your healthcare provider(s) for ongoing care. If your symptoms worsen or do not improve as expected, and you are unable to reach your usual health care provider(s), you should return to the Emergency Department. Today's healthcare is undergoing tremendous change, and patient satisfaction surveys are one of the many tools to assess the quality of medical care. You may receive a survey from the [employer] organization regarding your experience in the Emergency Department. I hope that your experience has been completely positive, particularly the medical care that I provided.   As such, please participate in the survey; anything less than excellent does not meet my expectations or intentions. [facility] participate in nationally recognized quality of care measures. If your blood pressure is greater than 120/80, as reported below, we urge that you seek medical care to address the potential of high blood pressure, commonly known as hypertension. Hypertension can be hereditary or can be caused by certain medical conditions, pain, stress, or \"white coat syndrome. \"       Please make an appointment with your health care provider(s) for follow up of your Emergency Department visit. VITALS:   Patient Vitals for the past 8 hrs:   Temp Pulse Resp BP SpO2   09/14/18 1111 98 °F (36.7 °C) 87 16 114/72 97 %          Thank you for allowing us to provide you with medical care today. We realize that you have many choices for your emergency care needs. Please choose us in the future for any continued health care needs.       Bhavin Lyman MD    [facility], [facility].   Office: [phone]            Recent Results (from the past 24 hour(s))   EKG, 12 LEAD, INITIAL    Collection Time: 09/14/18 11:33 AM   Result Value Ref Range    Ventricular Rate 76 BPM    Atrial Rate 76 BPM    P-R Interval 176 ms    QRS Duration 96 ms    Q-T Interval 418 ms    QTC Calculation (Bezet) 470 ms    Calculated P Axis -2 degrees    Calculated R Axis 36 degrees    Calculated T Axis 4 degrees    Diagnosis       Sinus rhythm with premature atrial complexes  When compared with ECG of 20-JUN-2018 17:48,  premature atrial complexes are now present  Inverted T waves have replaced nonspecific T wave abnormality in Inferior   leads     CBC WITH AUTOMATED DIFF    Collection Time: 09/14/18 12:43 PM   Result Value Ref Range    WBC 4.1 4.1 - 11.1 K/uL    RBC 3.25 (L) 4.10 - 5.70 M/uL    HGB 10.6 (L) 12.1 - 17.0 g/dL    HCT 33.4 (L) 36.6 - 50.3 %    .8 (H) 80.0 - 99.0 FL    MCH 32.6 26.0 - 34.0 PG    MCHC 31.7 30.0 - 36.5 g/dL    RDW 13.9 11.5 - 14.5 %    PLATELET 87 (L) 024 - 400 K/uL    MPV 13.0 (H) 8.9 - 12.9 FL    NRBC 0.0 0  WBC    ABSOLUTE NRBC 0.00 0.00 - 0.01 K/uL    NEUTROPHILS 50 32 - 75 %    LYMPHOCYTES 30 12 - 49 %    MONOCYTES 14 (H) 5 - 13 %    EOSINOPHILS 4 0 - 7 %    BASOPHILS 1 0 - 1 %    IMMATURE GRANULOCYTES 1 (H) 0.0 - 0.5 %    ABS. NEUTROPHILS 2.1 1.8 - 8.0 K/UL    ABS. LYMPHOCYTES 1.2 0.8 - 3.5 K/UL    ABS. MONOCYTES 0.6 0.0 - 1.0 K/UL    ABS. EOSINOPHILS 0.2 0.0 - 0.4 K/UL    ABS. BASOPHILS 0.0 0.0 - 0.1 K/UL    ABS. IMM. GRANS. 0.0 0.00 - 0.04 K/UL    DF AUTOMATED     METABOLIC PANEL, COMPREHENSIVE    Collection Time: 09/14/18 12:43 PM   Result Value Ref Range    Sodium 141 136 - 145 mmol/L    Potassium 4.9 3.5 - 5.1 mmol/L    Chloride 106 97 - 108 mmol/L    CO2 29 21 - 32 mmol/L    Anion gap 6 5 - 15 mmol/L    Glucose 107 (H) 65 - 100 mg/dL    BUN 9 6 - 20 MG/DL    Creatinine 0.86 0.70 - 1.30 MG/DL    BUN/Creatinine ratio 10 (L) 12 - 20      GFR est AA >60 >60 ml/min/1.73m2    GFR est non-AA >60 >60 ml/min/1.73m2    Calcium 8.3 (L) 8.5 - 10.1 MG/DL    Bilirubin, total 1.1 (H) 0.2 - 1.0 MG/DL    ALT (SGPT) 23 12 - 78 U/L    AST (SGOT) 75 (H) 15 - 37 U/L    Alk.  phosphatase 151 (H) 45 - 117 U/L    Protein, total 8.3 (H) 6.4 - 8.2 g/dL    Albumin 2.1 (L) 3.5 - 5.0 g/dL    Globulin 6.2 (H) 2.0 - 4.0 g/dL    A-G Ratio 0.3 (L) 1.1 - 2.2     LIPASE    Collection Time: 09/14/18 12:43 PM   Result Value Ref Range    Lipase 245 73 - 393 U/L   NT-PRO BNP    Collection Time: 09/14/18 12:43 PM   Result Value Ref Range    NT pro- (H) 0 - 125 PG/ML   CK W/ CKMB & INDEX    Collection Time: 09/14/18 12:43 PM   Result Value Ref Range     39 - 308 U/L    CK - MB 1.1 <3.6 NG/ML    CK-MB Index 0.9 0 - 2.5     TROPONIN I    Collection Time: 09/14/18 12:43 PM   Result Value Ref Range    Troponin-I, Qt. <0.05 <0.05 ng/mL   SAMPLES BEING HELD Collection Time: 09/14/18 12:45 PM   Result Value Ref Range    SAMPLES BEING HELD 1RED,1BLUE     COMMENT        Add-on orders for these samples will be processed based on acceptable specimen integrity and analyte stability, which may vary by analyte. URINALYSIS W/MICROSCOPIC    Collection Time: 09/14/18  1:27 PM   Result Value Ref Range    Color ORANGE      Appearance CLEAR CLEAR      Specific gravity 1.026 1.003 - 1.030      pH (UA) 6.0 5.0 - 8.0      Protein NEGATIVE  NEG mg/dL    Glucose NEGATIVE  NEG mg/dL    Ketone TRACE (A) NEG mg/dL    Blood MODERATE (A) NEG      Urobilinogen 4.0 (H) 0.2 - 1.0 EU/dL    Nitrites NEGATIVE  NEG      Leukocyte Esterase SMALL (A) NEG      WBC 0-4 0 - 4 /hpf    RBC 20-50 0 - 5 /hpf    Epithelial cells MODERATE (A) FEW /lpf    Bacteria NEGATIVE  NEG /hpf    Mucus TRACE (A) NEG /lpf   URINE CULTURE HOLD SAMPLE    Collection Time: 09/14/18  1:27 PM   Result Value Ref Range    Urine culture hold        URINE ON HOLD IN MICROBIOLOGY DEPT FOR 3 DAYS. IF UNPRESERVED URINE IS SUBMITTED, IT CANNOT BE USED FOR ADDITIONAL TESTING AFTER 24 HRS, RECOLLECTION WILL BE REQUIRED. BILIRUBIN, CONFIRM    Collection Time: 09/14/18  1:27 PM   Result Value Ref Range    Bilirubin UA, confirm POSITIVE (A) NEG         Xr Chest Pa Lat    Result Date: 9/14/2018  EXAM:  XR CHEST PA LAT INDICATION:  Chest pain COMPARISON: 7/10/2018 TECHNIQUE: PA and lateral chest views FINDINGS: The cardiomediastinal contours are stable. The pulmonary vasculature is within normal limits. The lungs and pleural spaces are clear. There is no pneumothorax. The bones and upper abdomen are stable. IMPRESSION: No acute process.  Stable exam.

## 2018-10-10 NOTE — MR AVS SNAPSHOT
[facility] 
 
 
 [address] [facility] [address] [address] [phone] Patient: Blanca Cartagena MRN: [id number] BEO:[birth year] Visit Information Date & Time Provider Department Dept. Phone Encounter #  
 10/10/2018  9:30 AM Diandra Terrazas NP [facility] [phone] [id number] Follow-up Instructions Return in about 6 weeks (around 11/21/2018), or if symptoms worsen or fail to improve. Upcoming Health Maintenance Date Due Shingrix Vaccine Age 50> (1 of 2) 3/26/2005 FOBT Q 1 YEAR AGE 50-75 9/4/2015 MEDICARE YEARLY EXAM 6/2/2018 Influenza Age 5 to Adult 8/1/2018 DTaP/Tdap/Td series (2 - Td) 11/13/2023 Allergies as of 10/10/2018  Review Complete On: 10/10/2018 By: Ulises Campuzano No Known Allergies Current Immunizations  Reviewed on 9/18/2015 Name Date Tdap 11/13/2013 Not reviewed this visit You Were Diagnosed With   
  
 Codes Comments Encounter to establish care    -  Primary ICD-10-CM: Z76.89 
ICD-9-CM: V65.8 Venous insufficiency of both lower extremities     ICD-10-CM: I87.2 ICD-9-CM: 459.81 Bilateral lower extremity edema     ICD-10-CM: R60.0 ICD-9-CM: 782.3 Essential hypertension     ICD-10-CM: I10 
ICD-9-CM: 401.9 Obesity, morbid ([facility]     ICD-10-CM: E66.01 
ICD-9-CM: 278.01 Chronic hepatitis C without hepatic coma (HCC)     ICD-10-CM: B18.2 ICD-9-CM: 070.54 Cirrhosis of liver without ascites, unspecified hepatic cirrhosis type ([facility]     ICD-10-CM: K74.60 ICD-9-CM: 571.5 Depression with anxiety     ICD-10-CM: F41.8 ICD-9-CM: 300.4 Anemia, unspecified type     ICD-10-CM: D64.9 ICD-9-CM: 006. 9 Dyspnea, unspecified type     ICD-10-CM: R06.00 
ICD-9-CM: 786.09 Vitals  BP Pulse Temp Resp Height(growth percentile) Weight(growth percentile)  
 118/66 (BP 1 Location: Left arm, BP Patient Position: Sitting) 90 96.3 °F (35.7 °C) (Oral) 18 5' 8\" (1.727 m) 279 lb 9.6 oz (126.8 kg) SpO2 BMI Smoking Status 98% 42.51 kg/m2 Current Every Day Smoker Vitals History BMI and BSA Data Body Mass Index Body Surface Area 42.51 kg/m 2 2.47 m 2 Preferred Pharmacy Pharmacy Name Phone [address], [facility] [phone] Your Updated Medication List  
  
   
This list is accurate as of 10/10/18 10:28 AM.  Always use your most recent med list.  
  
  
  
  
 bumetanide 2 mg tablet Commonly known as:  Elvi Brill Take 1 Tab by mouth daily. cloNIDine HCl 0.1 mg tablet Commonly known as:  CATAPRES Take 1 Tab by mouth two (2) times a day. Prescriptions Sent to Pharmacy Refills  
 bumetanide (BUMEX) 2 mg tablet 5 Sig: Take 1 Tab by mouth daily. Class: Normal  
 Pharmacy: [address] [place] Ph #: [phone] Route: Oral  
 cloNIDine HCl (CATAPRES) 0.1 mg tablet 5 Sig: Take 1 Tab by mouth two (2) times a day. Class: Normal  
 Pharmacy: [address] [place] Ph #: [phone] Route: Oral  
  
We Performed the Following AMB POC HEMOGLOBIN A1C [53318 CPT(R)] ANEMIA PROFILE B [id number] CPT(R)] LIPID PANEL [49829 CPT(R)] REFERRAL TO CARDIOLOGY [BKJ40 Custom] Comments:  
 Please schedule 2 appointments for patient. [facility] - for BLE swelling, venous insufficiency [facility] - for chest pain, dyspnea, dizziness REFERRAL TO PSYCHIATRY [REF91 Custom] Comments:  
 Patient with make own appointment with [facility] for anxiety, depression TSH RFX ON ABNORMAL TO FREE T4 [[id number] Custom] VITAMIN D, 25 HYDROXY [id number] CPT(R)] Follow-up Instructions Return in about 6 weeks (around 11/21/2018), or if symptoms worsen or fail to improve. Referral Information Referral ID Referred By Referred To  
  
 [id number] Thad Closs CHARLES Not Available Visits Status Start Date End Date 1 New Request 10/10/18 10/10/19 If your referral has a status of pending review or denied, additional information will be sent to support the outcome of this decision. Referral ID Referred By Referred To  
 [id number] Thad Closs CHARLES Not Available Visits Status Start Date End Date 1 New Request 10/10/18 10/10/19 If your referral has a status of pending review or denied, additional information will be sent to support the outcome of this decision. Introducing [facility]! Dear Irma Corea: Thank you for requesting a [employer] account. Our records indicate that you already have an active [employer] account. You can access your account anytime at [email or web address] Did you know that you can access your hospital and ER discharge instructions at any time in [employer]? You can also review all of your test results from your hospital stay or ER visit. Additional Information If you have questions, please visit the Frequently Asked Questions section of the [employer] website at [email or web address] Remember, [employer] is NOT to be used for urgent needs. For medical emergencies, dial 911. Now available from your iPhone and Android! Please provide this summary of care documentation to your next provider. Your primary care clinician is listed as NONE. If you have any questions after today's visit, please call [phone].
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
## 2018-10-10 NOTE — PROGRESS NOTES
Chief Complaint Patient presents with Steff Hockey Establish Care New Patient Pt states dizzy spells and chest tightness occur weekly. Pt states significant pain in both arms from gangrene surgery from 20 years ago and pain in left leg with swelling in both legs. Pt states completed HEP C medications about 4 months ago. 1. Have you been to the ER, urgent care clinic since your last visit? Hospitalized since your last visit? Yes, 9/14 for SOB and chest tightness 2. Have you seen or consulted any other health care providers outside of the [address] [place] [address] since your last visit? Include any pap smears or colon screening. No 
 
Health Maintenance Due Topic Date Due  Shingrix Vaccine Age 50> (1 of 2) 03/26/2005  FOBT Q 1 YEAR AGE 50-75  09/04/2015  MEDICARE YEARLY EXAM  06/02/2018  Influenza Age 5 to Adult  08/01/2018

## 2018-10-24 NOTE — TELEPHONE ENCOUNTER
----- Message from Blake Schwab sent at 10/24/2018  3:39 PM EDT -----  Regarding: NP [place]/Telephone  Pt requesting a call back regarding his hypertension Rx (medication name unknown). Stated the medication is not working. Best contact is [phone].

## 2018-10-25 NOTE — TELEPHONE ENCOUNTER
Spoke with pt and he could not recall why his BP medication was not working. Informed he did not have a follow up appt scheduled so set him up for 11/16 at 11:30 AM to go over BP. Pt verbalized understanding.

## 2018-10-29 NOTE — TELEPHONE ENCOUNTER
----- Message from [address] sent at 10/29/2018  1:08 PM EDT -----  Regarding: Np. Shiv/refill  Pt request refill on Clonidine 0.1 at [facility] on file. Pt accidentally dropped all of his meds in the sink.  Best contact number is [phone]

## 2018-11-16 NOTE — PROGRESS NOTES
Chief Complaint Patient presents with  Hypertension 1. Have you been to the ER, urgent care clinic since your last visit? Hospitalized since your last visit? No 
 
2. Have you seen or consulted any other health care providers outside of the [address] [place] [address] since your last visit? Include any pap smears or colon screening. No  
 
Pt declined flu shot. Pt states received Medicare Wellness at previous PCP. Stated that he would call previous PCP top get records sent after 2 failed attempts of release form being faxed. Health Maintenance Due Topic Date Due  Shingrix Vaccine Age 50> (1 of 2) 03/26/2005  FOBT Q 1 YEAR AGE 50-75  09/04/2015  MEDICARE YEARLY EXAM  06/02/2018  Influenza Age 5 to Adult  08/01/2018
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
## 2018-12-04 NOTE — PROGRESS NOTES
RECOMMENDATIONS: 
Potassium a little low. Take potassium supplement as discussed in office. Magnesium level normal.  Liver function stable. Kidney function stable. Cholesterol numbers look great!

## 2019-01-01 ENCOUNTER — ANESTHESIA (OUTPATIENT)
Dept: ENDOSCOPY | Age: 64
DRG: 432 | End: 2019-01-01
Payer: MEDICARE

## 2019-01-01 ENCOUNTER — APPOINTMENT (OUTPATIENT)
Dept: GENERAL RADIOLOGY | Age: 64
DRG: 432 | End: 2019-01-01
Attending: INTERNAL MEDICINE
Payer: MEDICARE

## 2019-01-01 ENCOUNTER — APPOINTMENT (OUTPATIENT)
Dept: GENERAL RADIOLOGY | Age: 64
End: 2019-01-01
Attending: EMERGENCY MEDICINE
Payer: MEDICARE

## 2019-01-01 ENCOUNTER — HOSPITAL ENCOUNTER (INPATIENT)
Age: 64
LOS: 8 days | DRG: 432 | End: 2019-02-10
Attending: EMERGENCY MEDICINE | Admitting: INTERNAL MEDICINE
Payer: MEDICARE

## 2019-01-01 ENCOUNTER — APPOINTMENT (OUTPATIENT)
Dept: ULTRASOUND IMAGING | Age: 64
DRG: 432 | End: 2019-01-01
Attending: SURGERY
Payer: MEDICARE

## 2019-01-01 ENCOUNTER — HOSPITAL ENCOUNTER (EMERGENCY)
Age: 64
Discharge: OTHER INSTITUTION | End: 2019-02-02
Attending: EMERGENCY MEDICINE
Payer: MEDICARE

## 2019-01-01 ENCOUNTER — APPOINTMENT (OUTPATIENT)
Dept: CT IMAGING | Age: 64
End: 2019-01-01
Attending: EMERGENCY MEDICINE
Payer: MEDICARE

## 2019-01-01 ENCOUNTER — APPOINTMENT (OUTPATIENT)
Dept: ULTRASOUND IMAGING | Age: 64
DRG: 432 | End: 2019-01-01
Attending: STUDENT IN AN ORGANIZED HEALTH CARE EDUCATION/TRAINING PROGRAM
Payer: MEDICARE

## 2019-01-01 ENCOUNTER — APPOINTMENT (OUTPATIENT)
Dept: ULTRASOUND IMAGING | Age: 64
DRG: 432 | End: 2019-01-01
Attending: INTERNAL MEDICINE
Payer: MEDICARE

## 2019-01-01 ENCOUNTER — APPOINTMENT (OUTPATIENT)
Dept: GENERAL RADIOLOGY | Age: 64
DRG: 603 | End: 2019-01-01
Attending: EMERGENCY MEDICINE
Payer: MEDICARE

## 2019-01-01 ENCOUNTER — APPOINTMENT (OUTPATIENT)
Dept: CT IMAGING | Age: 64
DRG: 432 | End: 2019-01-01
Attending: SPECIALIST
Payer: MEDICARE

## 2019-01-01 ENCOUNTER — HOSPITAL ENCOUNTER (INPATIENT)
Age: 64
LOS: 2 days | Discharge: HOME | DRG: 603 | End: 2019-01-03
Attending: EMERGENCY MEDICINE | Admitting: HOSPITALIST
Payer: MEDICARE

## 2019-01-01 ENCOUNTER — ANESTHESIA EVENT (OUTPATIENT)
Dept: ENDOSCOPY | Age: 64
DRG: 432 | End: 2019-01-01
Payer: MEDICARE

## 2019-01-01 VITALS
WEIGHT: 270 LBS | OXYGEN SATURATION: 98 % | BODY MASS INDEX: 38.65 KG/M2 | TEMPERATURE: 98.1 F | HEIGHT: 70 IN | SYSTOLIC BLOOD PRESSURE: 102 MMHG | RESPIRATION RATE: 14 BRPM | HEART RATE: 70 BPM | DIASTOLIC BLOOD PRESSURE: 65 MMHG

## 2019-01-01 VITALS
RESPIRATION RATE: 12 BRPM | TEMPERATURE: 98.5 F | BODY MASS INDEX: 32.96 KG/M2 | SYSTOLIC BLOOD PRESSURE: 139 MMHG | WEIGHT: 210 LBS | DIASTOLIC BLOOD PRESSURE: 77 MMHG | HEIGHT: 67 IN | HEART RATE: 138 BPM | OXYGEN SATURATION: 100 %

## 2019-01-01 DIAGNOSIS — K92.0 GASTROINTESTINAL HEMORRHAGE WITH HEMATEMESIS: ICD-10-CM

## 2019-01-01 DIAGNOSIS — R65.20 SEVERE SEPSIS (HCC): Primary | ICD-10-CM

## 2019-01-01 DIAGNOSIS — K70.30 ALCOHOLIC CIRRHOSIS OF LIVER WITHOUT ASCITES (HCC): ICD-10-CM

## 2019-01-01 DIAGNOSIS — Z71.89 DNR (DO NOT RESUSCITATE) DISCUSSION: ICD-10-CM

## 2019-01-01 DIAGNOSIS — D64.9 ANEMIA, UNSPECIFIED TYPE: ICD-10-CM

## 2019-01-01 DIAGNOSIS — R60.0 BILATERAL LOWER EXTREMITY EDEMA: ICD-10-CM

## 2019-01-01 DIAGNOSIS — R41.89 UNRESPONSIVE: ICD-10-CM

## 2019-01-01 DIAGNOSIS — L03.115 CELLULITIS OF RIGHT LEG: ICD-10-CM

## 2019-01-01 DIAGNOSIS — R50.9 FEVER, UNSPECIFIED FEVER CAUSE: Primary | ICD-10-CM

## 2019-01-01 DIAGNOSIS — Z71.89 GOALS OF CARE, COUNSELING/DISCUSSION: ICD-10-CM

## 2019-01-01 DIAGNOSIS — A41.9 SEVERE SEPSIS (HCC): Primary | ICD-10-CM

## 2019-01-01 DIAGNOSIS — E87.20 LACTIC ACIDOSIS: ICD-10-CM

## 2019-01-01 DIAGNOSIS — K81.9 CHOLECYSTITIS: ICD-10-CM

## 2019-01-01 DIAGNOSIS — B18.2 CHRONIC HEPATITIS C WITHOUT HEPATIC COMA (HCC): ICD-10-CM

## 2019-01-01 DIAGNOSIS — K92.0 HEMATEMESIS WITH NAUSEA: Primary | ICD-10-CM

## 2019-01-01 DIAGNOSIS — D50.0 BLOOD LOSS ANEMIA: ICD-10-CM

## 2019-01-01 DIAGNOSIS — F11.91 HISTORY OF HEROIN USE: ICD-10-CM

## 2019-01-01 DIAGNOSIS — R40.2433 GLASGOW COMA SCALE TOTAL SCORE 3-8, AT HOSPITAL ADMISSION (HCC): ICD-10-CM

## 2019-01-01 DIAGNOSIS — E87.6 HYPOKALEMIA: ICD-10-CM

## 2019-01-01 PROBLEM — L03.90 CELLULITIS: Status: ACTIVE | Noted: 2019-01-01

## 2019-01-01 LAB
ABO + RH BLD: NORMAL
ABO + RH BLD: NORMAL
AFP-TM SERPL-MCNC: 34 NG/ML (ref 0–8.3)
ALBUMIN FLD-MCNC: 0.1 G/DL
ALBUMIN SERPL-MCNC: 1.8 G/DL (ref 3.5–5)
ALBUMIN SERPL-MCNC: 1.9 G/DL (ref 3.5–5)
ALBUMIN SERPL-MCNC: 1.9 G/DL (ref 3.5–5)
ALBUMIN SERPL-MCNC: 2 G/DL (ref 3.5–5)
ALBUMIN SERPL-MCNC: 2 G/DL (ref 3.5–5)
ALBUMIN SERPL-MCNC: 2.3 G/DL (ref 3.5–5)
ALBUMIN SERPL-MCNC: 3 G/DL (ref 3.5–5)
ALBUMIN SERPL-MCNC: 3.3 G/DL (ref 3.5–5)
ALBUMIN/GLOB SERPL: 0.4 {RATIO} (ref 1.1–2.2)
ALBUMIN/GLOB SERPL: 0.4 {RATIO} (ref 1.1–2.2)
ALBUMIN/GLOB SERPL: 0.5 {RATIO} (ref 1.1–2.2)
ALBUMIN/GLOB SERPL: 0.9 {RATIO} (ref 1.1–2.2)
ALBUMIN/GLOB SERPL: 1.3 {RATIO} (ref 1.1–2.2)
ALP SERPL-CCNC: 102 U/L (ref 45–117)
ALP SERPL-CCNC: 63 U/L (ref 45–117)
ALP SERPL-CCNC: 76 U/L (ref 45–117)
ALP SERPL-CCNC: 82 U/L (ref 45–117)
ALP SERPL-CCNC: 90 U/L (ref 45–117)
ALP SERPL-CCNC: 91 U/L (ref 45–117)
ALP SERPL-CCNC: 97 U/L (ref 45–117)
ALP SERPL-CCNC: 98 U/L (ref 45–117)
ALT SERPL-CCNC: 139 U/L (ref 12–78)
ALT SERPL-CCNC: 144 U/L (ref 12–78)
ALT SERPL-CCNC: 15 U/L (ref 12–78)
ALT SERPL-CCNC: 232 U/L (ref 12–78)
ALT SERPL-CCNC: 46 U/L (ref 12–78)
ALT SERPL-CCNC: 735 U/L (ref 12–78)
ALT SERPL-CCNC: 838 U/L (ref 12–78)
ALT SERPL-CCNC: 924 U/L (ref 12–78)
AMMONIA PLAS-SCNC: 44 UMOL/L
AMMONIA PLAS-SCNC: 48 UMOL/L
AMMONIA PLAS-SCNC: 57 UMOL/L
AMMONIA PLAS-SCNC: 68 UMOL/L
AMMONIA PLAS-SCNC: 76 UMOL/L
AMMONIA PLAS-SCNC: 77 UMOL/L
AMPHET UR QL SCN: NEGATIVE
ANION GAP SERPL CALC-SCNC: 10 MMOL/L (ref 5–15)
ANION GAP SERPL CALC-SCNC: 12 MMOL/L (ref 5–15)
ANION GAP SERPL CALC-SCNC: 4 MMOL/L (ref 5–15)
ANION GAP SERPL CALC-SCNC: 6 MMOL/L (ref 5–15)
ANION GAP SERPL CALC-SCNC: 7 MMOL/L (ref 5–15)
ANION GAP SERPL CALC-SCNC: 8 MMOL/L (ref 5–15)
ANION GAP SERPL CALC-SCNC: 9 MMOL/L (ref 5–15)
APPEARANCE FLD: CLEAR
APPEARANCE UR: CLEAR
APTT PPP: 27.2 SEC (ref 22.1–32)
APTT PPP: 43.6 SEC (ref 22.1–32)
ARTERIAL PATENCY WRIST A: YES
AST SERPL-CCNC: 105 U/L (ref 15–37)
AST SERPL-CCNC: 1114 U/L (ref 15–37)
AST SERPL-CCNC: 1644 U/L (ref 15–37)
AST SERPL-CCNC: 1737 U/L (ref 15–37)
AST SERPL-CCNC: 180 U/L (ref 15–37)
AST SERPL-CCNC: 263 U/L (ref 15–37)
AST SERPL-CCNC: 32 U/L (ref 15–37)
AST SERPL-CCNC: 337 U/L (ref 15–37)
AST SERPL-CCNC: 83 U/L (ref 15–37)
ATRIAL RATE: 126 BPM
BACTERIA SPEC CULT: ABNORMAL
BACTERIA SPEC CULT: ABNORMAL
BACTERIA SPEC CULT: NORMAL
BACTERIA URNS QL MICRO: NEGATIVE /HPF
BARBITURATES UR QL SCN: NEGATIVE
BASE DEFICIT BLD-SCNC: 5 MMOL/L
BASE DEFICIT BLD-SCNC: 7 MMOL/L
BASE DEFICIT BLD-SCNC: 8 MMOL/L
BASE DEFICIT BLDA-SCNC: 2.4 MMOL/L
BASE DEFICIT BLDA-SCNC: 4.4 MMOL/L
BASE EXCESS BLDA CALC-SCNC: 0.6 MMOL/L
BASOPHILS # BLD: 0 K/UL (ref 0–0.1)
BASOPHILS # BLD: 0.1 K/UL (ref 0–0.1)
BASOPHILS NFR BLD: 0 % (ref 0–1)
BASOPHILS NFR BLD: 1 % (ref 0–1)
BDY SITE: ABNORMAL
BENZODIAZ UR QL: NEGATIVE
BILIRUB FLD-MCNC: 0.2 MG/DL
BILIRUB SERPL-MCNC: 1 MG/DL (ref 0.2–1)
BILIRUB SERPL-MCNC: 1.3 MG/DL (ref 0.2–1)
BILIRUB SERPL-MCNC: 1.7 MG/DL (ref 0.2–1)
BILIRUB SERPL-MCNC: 12.4 MG/DL (ref 0.2–1)
BILIRUB SERPL-MCNC: 16.1 MG/DL (ref 0.2–1)
BILIRUB SERPL-MCNC: 18.8 MG/DL (ref 0.2–1)
BILIRUB SERPL-MCNC: 3 MG/DL (ref 0.2–1)
BILIRUB SERPL-MCNC: 6.3 MG/DL (ref 0.2–1)
BILIRUB SERPL-MCNC: 7.7 MG/DL (ref 0.2–1)
BILIRUB UR QL CFM: NEGATIVE
BLD PROD TYP BPU: NORMAL
BLOOD GROUP ANTIBODIES SERPL: NORMAL
BLOOD GROUP ANTIBODIES SERPL: NORMAL
BPU ID: NORMAL
BREATHS.SPONTANEOUS ON VENT: 27
BUN SERPL-MCNC: 10 MG/DL (ref 6–20)
BUN SERPL-MCNC: 11 MG/DL (ref 6–20)
BUN SERPL-MCNC: 12 MG/DL (ref 6–20)
BUN SERPL-MCNC: 28 MG/DL (ref 6–20)
BUN SERPL-MCNC: 36 MG/DL (ref 6–20)
BUN SERPL-MCNC: 48 MG/DL (ref 6–20)
BUN SERPL-MCNC: 56 MG/DL (ref 6–20)
BUN SERPL-MCNC: 58 MG/DL (ref 6–20)
BUN SERPL-MCNC: 60 MG/DL (ref 6–20)
BUN SERPL-MCNC: 64 MG/DL (ref 6–20)
BUN SERPL-MCNC: 65 MG/DL (ref 6–20)
BUN SERPL-MCNC: 66 MG/DL (ref 6–20)
BUN/CREAT SERPL: 10 (ref 12–20)
BUN/CREAT SERPL: 11 (ref 12–20)
BUN/CREAT SERPL: 12 (ref 12–20)
BUN/CREAT SERPL: 21 (ref 12–20)
BUN/CREAT SERPL: 24 (ref 12–20)
BUN/CREAT SERPL: 24 (ref 12–20)
BUN/CREAT SERPL: 26 (ref 12–20)
BUN/CREAT SERPL: 27 (ref 12–20)
BUN/CREAT SERPL: 28 (ref 12–20)
BUN/CREAT SERPL: 30 (ref 12–20)
BUN/CREAT SERPL: 30 (ref 12–20)
BUN/CREAT SERPL: 31 (ref 12–20)
CALCIUM SERPL-MCNC: 6.6 MG/DL (ref 8.5–10.1)
CALCIUM SERPL-MCNC: 6.8 MG/DL (ref 8.5–10.1)
CALCIUM SERPL-MCNC: 7.1 MG/DL (ref 8.5–10.1)
CALCIUM SERPL-MCNC: 7.5 MG/DL (ref 8.5–10.1)
CALCIUM SERPL-MCNC: 7.6 MG/DL (ref 8.5–10.1)
CALCIUM SERPL-MCNC: 7.6 MG/DL (ref 8.5–10.1)
CALCIUM SERPL-MCNC: 7.8 MG/DL (ref 8.5–10.1)
CALCIUM SERPL-MCNC: 7.9 MG/DL (ref 8.5–10.1)
CALCIUM SERPL-MCNC: 8.2 MG/DL (ref 8.5–10.1)
CALCIUM SERPL-MCNC: 8.5 MG/DL (ref 8.5–10.1)
CALCIUM SERPL-MCNC: 8.6 MG/DL (ref 8.5–10.1)
CALCIUM SERPL-MCNC: 8.9 MG/DL (ref 8.5–10.1)
CALCULATED R AXIS, ECG10: 37 DEGREES
CALCULATED T AXIS, ECG11: 30 DEGREES
CANNABINOIDS UR QL SCN: NEGATIVE
CC UR VC: NORMAL
CHLORIDE SERPL-SCNC: 100 MMOL/L (ref 97–108)
CHLORIDE SERPL-SCNC: 103 MMOL/L (ref 97–108)
CHLORIDE SERPL-SCNC: 104 MMOL/L (ref 97–108)
CHLORIDE SERPL-SCNC: 105 MMOL/L (ref 97–108)
CHLORIDE SERPL-SCNC: 113 MMOL/L (ref 97–108)
CHLORIDE SERPL-SCNC: 116 MMOL/L (ref 97–108)
CHLORIDE SERPL-SCNC: 118 MMOL/L (ref 97–108)
CHLORIDE SERPL-SCNC: 118 MMOL/L (ref 97–108)
CHLORIDE SERPL-SCNC: 120 MMOL/L (ref 97–108)
CHLORIDE SERPL-SCNC: 121 MMOL/L (ref 97–108)
CHLORIDE SERPL-SCNC: 122 MMOL/L (ref 97–108)
CHLORIDE SERPL-SCNC: 125 MMOL/L (ref 97–108)
CO2 SERPL-SCNC: 17 MMOL/L (ref 21–32)
CO2 SERPL-SCNC: 20 MMOL/L (ref 21–32)
CO2 SERPL-SCNC: 20 MMOL/L (ref 21–32)
CO2 SERPL-SCNC: 21 MMOL/L (ref 21–32)
CO2 SERPL-SCNC: 21 MMOL/L (ref 21–32)
CO2 SERPL-SCNC: 22 MMOL/L (ref 21–32)
CO2 SERPL-SCNC: 23 MMOL/L (ref 21–32)
CO2 SERPL-SCNC: 23 MMOL/L (ref 21–32)
CO2 SERPL-SCNC: 26 MMOL/L (ref 21–32)
CO2 SERPL-SCNC: 27 MMOL/L (ref 21–32)
CO2 SERPL-SCNC: 29 MMOL/L (ref 21–32)
CO2 SERPL-SCNC: 30 MMOL/L (ref 21–32)
COCAINE UR QL SCN: NEGATIVE
COLOR FLD: YELLOW
COLOR UR: ABNORMAL
COMMENT, HOLDF: NORMAL
CREAT SERPL-MCNC: 0.93 MG/DL (ref 0.7–1.3)
CREAT SERPL-MCNC: 1.04 MG/DL (ref 0.7–1.3)
CREAT SERPL-MCNC: 1.12 MG/DL (ref 0.7–1.3)
CREAT SERPL-MCNC: 1.32 MG/DL (ref 0.7–1.3)
CREAT SERPL-MCNC: 1.49 MG/DL (ref 0.7–1.3)
CREAT SERPL-MCNC: 1.87 MG/DL (ref 0.7–1.3)
CREAT SERPL-MCNC: 2.2 MG/DL (ref 0.7–1.3)
CREAT SERPL-MCNC: 2.26 MG/DL (ref 0.7–1.3)
CREAT SERPL-MCNC: 2.32 MG/DL (ref 0.7–1.3)
CREAT SERPL-MCNC: 2.75 MG/DL (ref 0.7–1.3)
CROSSMATCH RESULT,%XM: NORMAL
CRP SERPL HS-MCNC: >9.5 MG/L
DIAGNOSIS, 93000: NORMAL
DIFFERENTIAL METHOD BLD: ABNORMAL
DRUG SCRN COMMENT,DRGCM: ABNORMAL
EOSINOPHIL # BLD: 0 K/UL (ref 0–0.4)
EOSINOPHIL # BLD: 0.1 K/UL (ref 0–0.4)
EOSINOPHIL NFR BLD: 0 % (ref 0–7)
EOSINOPHIL NFR BLD: 1 % (ref 0–7)
EOSINOPHIL NFR BLD: 2 % (ref 0–7)
EPAP/CPAP/PEEP, PAPEEP: 6
EPITH CASTS URNS QL MICRO: ABNORMAL /LPF
ERYTHROCYTE [DISTWIDTH] IN BLOOD BY AUTOMATED COUNT: 12.7 % (ref 11.5–14.5)
ERYTHROCYTE [DISTWIDTH] IN BLOOD BY AUTOMATED COUNT: 13 % (ref 11.5–14.5)
ERYTHROCYTE [DISTWIDTH] IN BLOOD BY AUTOMATED COUNT: 13.4 % (ref 11.5–14.5)
ERYTHROCYTE [DISTWIDTH] IN BLOOD BY AUTOMATED COUNT: 14.1 % (ref 11.5–14.5)
ERYTHROCYTE [DISTWIDTH] IN BLOOD BY AUTOMATED COUNT: 16.5 % (ref 11.5–14.5)
ERYTHROCYTE [DISTWIDTH] IN BLOOD BY AUTOMATED COUNT: 18.5 % (ref 11.5–14.5)
ERYTHROCYTE [DISTWIDTH] IN BLOOD BY AUTOMATED COUNT: 20.4 % (ref 11.5–14.5)
ERYTHROCYTE [DISTWIDTH] IN BLOOD BY AUTOMATED COUNT: 22.5 % (ref 11.5–14.5)
ERYTHROCYTE [DISTWIDTH] IN BLOOD BY AUTOMATED COUNT: 24.7 % (ref 11.5–14.5)
ERYTHROCYTE [DISTWIDTH] IN BLOOD BY AUTOMATED COUNT: 25.2 % (ref 11.5–14.5)
ERYTHROCYTE [DISTWIDTH] IN BLOOD BY AUTOMATED COUNT: 25.6 % (ref 11.5–14.5)
ERYTHROCYTE [SEDIMENTATION RATE] IN BLOOD: 99 MM/HR (ref 0–20)
ETHANOL SERPL-MCNC: <10 MG/DL
FIBRINOGEN PPP-MCNC: 95 MG/DL (ref 200–475)
FIO2 ON VENT: 40 %
FIO2 ON VENT: 50 %
FIO2 ON VENT: 50 %
FLUAV AG NPH QL IA: NEGATIVE
FLUBV AG NOSE QL IA: NEGATIVE
GAS FLOW.O2 O2 DELIVERY SYS: ABNORMAL L/MIN
GAS FLOW.O2 SETTING OXYMISER: 12 BPM
GAS FLOW.O2 SETTING OXYMISER: 12 L/MIN
GLOBULIN SER CALC-MCNC: 2.6 G/DL (ref 2–4)
GLOBULIN SER CALC-MCNC: 3.4 G/DL (ref 2–4)
GLOBULIN SER CALC-MCNC: 4.1 G/DL (ref 2–4)
GLOBULIN SER CALC-MCNC: 4.1 G/DL (ref 2–4)
GLOBULIN SER CALC-MCNC: 4.2 G/DL (ref 2–4)
GLOBULIN SER CALC-MCNC: 4.6 G/DL (ref 2–4)
GLOBULIN SER CALC-MCNC: 5.1 G/DL (ref 2–4)
GLOBULIN SER CALC-MCNC: 5.3 G/DL (ref 2–4)
GLUCOSE BLD STRIP.AUTO-MCNC: 120 MG/DL (ref 65–100)
GLUCOSE FLD-MCNC: 123 MG/DL
GLUCOSE SERPL-MCNC: 112 MG/DL (ref 65–100)
GLUCOSE SERPL-MCNC: 113 MG/DL (ref 65–100)
GLUCOSE SERPL-MCNC: 114 MG/DL (ref 65–100)
GLUCOSE SERPL-MCNC: 116 MG/DL (ref 65–100)
GLUCOSE SERPL-MCNC: 117 MG/DL (ref 65–100)
GLUCOSE SERPL-MCNC: 117 MG/DL (ref 65–100)
GLUCOSE SERPL-MCNC: 120 MG/DL (ref 65–100)
GLUCOSE SERPL-MCNC: 135 MG/DL (ref 65–100)
GLUCOSE SERPL-MCNC: 135 MG/DL (ref 65–100)
GLUCOSE SERPL-MCNC: 136 MG/DL (ref 65–100)
GLUCOSE SERPL-MCNC: 148 MG/DL (ref 65–100)
GLUCOSE SERPL-MCNC: 97 MG/DL (ref 65–100)
GLUCOSE UR STRIP.AUTO-MCNC: NEGATIVE MG/DL
GRAM STN SPEC: ABNORMAL
GRAM STN SPEC: NORMAL
GRAM STN SPEC: NORMAL
HAV IGM SER QL: NONREACTIVE
HBV CORE IGM SER QL: NONREACTIVE
HBV SURFACE AG SER QL: 0.17 INDEX
HBV SURFACE AG SER QL: NEGATIVE
HCO3 BLD-SCNC: 15.9 MMOL/L (ref 22–26)
HCO3 BLD-SCNC: 17.5 MMOL/L (ref 22–26)
HCO3 BLD-SCNC: 18.6 MMOL/L (ref 22–26)
HCO3 BLDA-SCNC: 20 MMOL/L (ref 22–26)
HCO3 BLDA-SCNC: 22 MMOL/L (ref 22–26)
HCO3 BLDA-SCNC: 23 MMOL/L (ref 22–26)
HCT VFR BLD AUTO: 18.5 % (ref 36.6–50.3)
HCT VFR BLD AUTO: 21.8 % (ref 36.6–50.3)
HCT VFR BLD AUTO: 22.1 % (ref 36.6–50.3)
HCT VFR BLD AUTO: 22.9 % (ref 36.6–50.3)
HCT VFR BLD AUTO: 24.2 % (ref 36.6–50.3)
HCT VFR BLD AUTO: 24.5 % (ref 36.6–50.3)
HCT VFR BLD AUTO: 24.5 % (ref 36.6–50.3)
HCT VFR BLD AUTO: 24.6 % (ref 36.6–50.3)
HCT VFR BLD AUTO: 24.9 % (ref 36.6–50.3)
HCT VFR BLD AUTO: 25.3 % (ref 36.6–50.3)
HCT VFR BLD AUTO: 26 % (ref 36.6–50.3)
HCT VFR BLD AUTO: 26.7 % (ref 36.6–50.3)
HCT VFR BLD AUTO: 26.7 % (ref 36.6–50.3)
HCT VFR BLD AUTO: 35 % (ref 36.6–50.3)
HCV AB SER IA-ACNC: >11 INDEX
HCV AB SERPL QL IA: REACTIVE
HCV COMMENT,HCGAC: ABNORMAL
HGB BLD-MCNC: 10.9 G/DL (ref 12.1–17)
HGB BLD-MCNC: 6.2 G/DL (ref 12.1–17)
HGB BLD-MCNC: 6.8 G/DL (ref 12.1–17)
HGB BLD-MCNC: 6.8 G/DL (ref 12.1–17)
HGB BLD-MCNC: 6.9 G/DL (ref 12.1–17)
HGB BLD-MCNC: 7.5 G/DL (ref 12.1–17)
HGB BLD-MCNC: 7.7 G/DL (ref 12.1–17)
HGB BLD-MCNC: 7.8 G/DL (ref 12.1–17)
HGB BLD-MCNC: 7.8 G/DL (ref 12.1–17)
HGB BLD-MCNC: 7.9 G/DL (ref 12.1–17)
HGB BLD-MCNC: 8 G/DL (ref 12.1–17)
HGB BLD-MCNC: 8 G/DL (ref 12.1–17)
HGB BLD-MCNC: 8.2 G/DL (ref 12.1–17)
HGB BLD-MCNC: 8.5 G/DL (ref 12.1–17)
HGB BLD-MCNC: 8.6 G/DL (ref 12.1–17)
HGB BLD-MCNC: 8.6 G/DL (ref 12.1–17)
HGB UR QL STRIP: ABNORMAL
IMM GRANULOCYTES # BLD AUTO: 0 K/UL (ref 0–0.04)
IMM GRANULOCYTES # BLD AUTO: 0.2 K/UL (ref 0–0.04)
IMM GRANULOCYTES # BLD AUTO: 0.2 K/UL (ref 0–0.04)
IMM GRANULOCYTES # BLD: 0 K/UL (ref 0–0.04)
IMM GRANULOCYTES # BLD: 0 K/UL (ref 0–0.04)
IMM GRANULOCYTES NFR BLD AUTO: 0 % (ref 0–0.5)
IMM GRANULOCYTES NFR BLD AUTO: 1 % (ref 0–0.5)
IMM GRANULOCYTES NFR BLD AUTO: 1 % (ref 0–0.5)
INR PPP: 1.7 (ref 0.9–1.1)
INR PPP: 1.8 (ref 0.9–1.1)
INR PPP: 2.7 (ref 0.9–1.1)
KETONES UR QL STRIP.AUTO: ABNORMAL MG/DL
LACTATE BLD-SCNC: 1.92 MMOL/L (ref 0.4–2)
LACTATE SERPL-SCNC: 1.7 MMOL/L (ref 0.4–2)
LACTATE SERPL-SCNC: 2.5 MMOL/L (ref 0.4–2)
LACTATE SERPL-SCNC: 2.6 MMOL/L (ref 0.4–2)
LACTATE SERPL-SCNC: 5.4 MMOL/L (ref 0.4–2)
LACTATE SERPL-SCNC: 6 MMOL/L (ref 0.4–2)
LDH FLD L TO P-CCNC: 94 U/L
LEUKOCYTE ESTERASE UR QL STRIP.AUTO: NEGATIVE
LIPASE SERPL-CCNC: 206 U/L (ref 73–393)
LIPASE SERPL-CCNC: 210 U/L (ref 73–393)
LIPASE SERPL-CCNC: 307 U/L (ref 73–393)
LIPASE SERPL-CCNC: 515 U/L (ref 73–393)
LIPASE SERPL-CCNC: 674 U/L (ref 73–393)
LYMPHOCYTES # BLD: 1.4 K/UL (ref 0.8–3.5)
LYMPHOCYTES # BLD: 1.4 K/UL (ref 0.8–3.5)
LYMPHOCYTES # BLD: 1.9 K/UL (ref 0.8–3.5)
LYMPHOCYTES # BLD: 1.9 K/UL (ref 0.8–3.5)
LYMPHOCYTES # BLD: 2.4 K/UL (ref 0.8–3.5)
LYMPHOCYTES # BLD: 2.8 K/UL (ref 0.8–3.5)
LYMPHOCYTES # BLD: 3.5 K/UL (ref 0.8–3.5)
LYMPHOCYTES # BLD: 3.6 K/UL (ref 0.8–3.5)
LYMPHOCYTES # BLD: 4.5 K/UL (ref 0.8–3.5)
LYMPHOCYTES NFR BLD: 12 % (ref 12–49)
LYMPHOCYTES NFR BLD: 14 % (ref 12–49)
LYMPHOCYTES NFR BLD: 19 % (ref 12–49)
LYMPHOCYTES NFR BLD: 19 % (ref 12–49)
LYMPHOCYTES NFR BLD: 21 % (ref 12–49)
LYMPHOCYTES NFR BLD: 33 % (ref 12–49)
LYMPHOCYTES NFR BLD: 7 % (ref 12–49)
LYMPHOCYTES NFR FLD: 50 %
MAGNESIUM SERPL-MCNC: 2 MG/DL (ref 1.6–2.4)
MAGNESIUM SERPL-MCNC: 2.1 MG/DL (ref 1.6–2.4)
MAGNESIUM SERPL-MCNC: 2.3 MG/DL (ref 1.6–2.4)
MAGNESIUM SERPL-MCNC: 2.8 MG/DL (ref 1.6–2.4)
MAGNESIUM SERPL-MCNC: 3.4 MG/DL (ref 1.6–2.4)
MAGNESIUM SERPL-MCNC: 3.5 MG/DL (ref 1.6–2.4)
MCH RBC QN AUTO: 30.7 PG (ref 26–34)
MCH RBC QN AUTO: 30.9 PG (ref 26–34)
MCH RBC QN AUTO: 31.6 PG (ref 26–34)
MCH RBC QN AUTO: 31.7 PG (ref 26–34)
MCH RBC QN AUTO: 31.7 PG (ref 26–34)
MCH RBC QN AUTO: 31.8 PG (ref 26–34)
MCH RBC QN AUTO: 32.1 PG (ref 26–34)
MCH RBC QN AUTO: 32.2 PG (ref 26–34)
MCH RBC QN AUTO: 32.3 PG (ref 26–34)
MCH RBC QN AUTO: 32.5 PG (ref 26–34)
MCH RBC QN AUTO: 32.7 PG (ref 26–34)
MCH RBC QN AUTO: 33 PG (ref 26–34)
MCH RBC QN AUTO: 33.3 PG (ref 26–34)
MCHC RBC AUTO-ENTMCNC: 30.7 G/DL (ref 30–36.5)
MCHC RBC AUTO-ENTMCNC: 30.8 G/DL (ref 30–36.5)
MCHC RBC AUTO-ENTMCNC: 30.8 G/DL (ref 30–36.5)
MCHC RBC AUTO-ENTMCNC: 30.9 G/DL (ref 30–36.5)
MCHC RBC AUTO-ENTMCNC: 31 G/DL (ref 30–36.5)
MCHC RBC AUTO-ENTMCNC: 31.1 G/DL (ref 30–36.5)
MCHC RBC AUTO-ENTMCNC: 31.2 G/DL (ref 30–36.5)
MCHC RBC AUTO-ENTMCNC: 31.8 G/DL (ref 30–36.5)
MCHC RBC AUTO-ENTMCNC: 32.2 G/DL (ref 30–36.5)
MCHC RBC AUTO-ENTMCNC: 32.5 G/DL (ref 30–36.5)
MCHC RBC AUTO-ENTMCNC: 32.7 G/DL (ref 30–36.5)
MCHC RBC AUTO-ENTMCNC: 33.1 G/DL (ref 30–36.5)
MCHC RBC AUTO-ENTMCNC: 33.5 G/DL (ref 30–36.5)
MCV RBC AUTO: 101.5 FL (ref 80–99)
MCV RBC AUTO: 102 FL (ref 80–99)
MCV RBC AUTO: 102.5 FL (ref 80–99)
MCV RBC AUTO: 104.1 FL (ref 80–99)
MCV RBC AUTO: 104.3 FL (ref 80–99)
MCV RBC AUTO: 104.7 FL (ref 80–99)
MCV RBC AUTO: 105.8 FL (ref 80–99)
MCV RBC AUTO: 108.3 FL (ref 80–99)
MCV RBC AUTO: 93.5 FL (ref 80–99)
MCV RBC AUTO: 94.3 FL (ref 80–99)
MCV RBC AUTO: 96.8 FL (ref 80–99)
MCV RBC AUTO: 96.9 FL (ref 80–99)
MCV RBC AUTO: 99.6 FL (ref 80–99)
METAMYELOCYTES NFR BLD MANUAL: 1 %
METAMYELOCYTES NFR BLD MANUAL: 2 %
METHADONE UR QL: POSITIVE
MONOCYTES # BLD: 0.9 K/UL (ref 0–1)
MONOCYTES # BLD: 1.1 K/UL (ref 0–1)
MONOCYTES # BLD: 1.7 K/UL (ref 0–1)
MONOCYTES # BLD: 1.8 K/UL (ref 0–1)
MONOCYTES # BLD: 2.1 K/UL (ref 0–1)
MONOCYTES # BLD: 2.6 K/UL (ref 0–1)
MONOCYTES # BLD: 3 K/UL (ref 0–1)
MONOCYTES # BLD: 3.2 K/UL (ref 0–1)
MONOCYTES # BLD: 4.2 K/UL (ref 0–1)
MONOCYTES NFR BLD: 10 % (ref 5–13)
MONOCYTES NFR BLD: 10 % (ref 5–13)
MONOCYTES NFR BLD: 11 % (ref 5–13)
MONOCYTES NFR BLD: 13 % (ref 5–13)
MONOCYTES NFR BLD: 15 % (ref 5–13)
MONOCYTES NFR BLD: 16 % (ref 5–13)
MONOCYTES NFR BLD: 18 % (ref 5–13)
MONOCYTES NFR BLD: 21 % (ref 5–13)
MONOCYTES NFR BLD: 8 % (ref 5–13)
MONOS+MACROS NFR FLD: 20 %
MYELOCYTES NFR BLD MANUAL: 1 %
MYELOCYTES NFR BLD MANUAL: 1 %
NEUTROPHILS NFR FLD: 30 %
NEUTS BAND NFR BLD MANUAL: 1 %
NEUTS SEG # BLD: 1.7 K/UL (ref 1.8–8)
NEUTS SEG # BLD: 11.6 K/UL (ref 1.8–8)
NEUTS SEG # BLD: 11.6 K/UL (ref 1.8–8)
NEUTS SEG # BLD: 12.8 K/UL (ref 1.8–8)
NEUTS SEG # BLD: 14.7 K/UL (ref 1.8–8)
NEUTS SEG # BLD: 16.1 K/UL (ref 1.8–8)
NEUTS SEG # BLD: 21.8 K/UL (ref 1.8–8)
NEUTS SEG # BLD: 24.7 K/UL (ref 1.8–8)
NEUTS SEG # BLD: 5 K/UL (ref 1.8–8)
NEUTS SEG NFR BLD: 43 % (ref 32–75)
NEUTS SEG NFR BLD: 66 % (ref 32–75)
NEUTS SEG NFR BLD: 68 % (ref 32–75)
NEUTS SEG NFR BLD: 69 % (ref 32–75)
NEUTS SEG NFR BLD: 70 % (ref 32–75)
NEUTS SEG NFR BLD: 72 % (ref 32–75)
NEUTS SEG NFR BLD: 72 % (ref 32–75)
NEUTS SEG NFR BLD: 76 % (ref 32–75)
NEUTS SEG NFR BLD: 80 % (ref 32–75)
NITRITE UR QL STRIP.AUTO: NEGATIVE
NRBC # BLD: 0 K/UL (ref 0–0.01)
NRBC # BLD: 0.02 K/UL (ref 0–0.01)
NRBC # BLD: 0.05 K/UL (ref 0–0.01)
NRBC # BLD: 0.06 K/UL (ref 0–0.01)
NRBC # BLD: 0.1 K/UL (ref 0–0.01)
NRBC # BLD: 0.11 K/UL (ref 0–0.01)
NRBC # BLD: 0.15 K/UL (ref 0–0.01)
NRBC # BLD: 0.16 K/UL (ref 0–0.01)
NRBC # BLD: 0.2 K/UL (ref 0–0.01)
NRBC # BLD: 0.23 K/UL (ref 0–0.01)
NRBC # BLD: 0.38 K/UL (ref 0–0.01)
NRBC BLD-RTO: 0 PER 100 WBC
NRBC BLD-RTO: 0.1 PER 100 WBC
NRBC BLD-RTO: 0.2 PER 100 WBC
NRBC BLD-RTO: 0.4 PER 100 WBC
NRBC BLD-RTO: 0.5 PER 100 WBC
NRBC BLD-RTO: 0.6 PER 100 WBC
NRBC BLD-RTO: 0.6 PER 100 WBC
NRBC BLD-RTO: 0.7 PER 100 WBC
NRBC BLD-RTO: 1.1 PER 100 WBC
NUC CELL # FLD: 154 /CU MM
O2/TOTAL GAS SETTING VFR VENT: 0.4 %
O2/TOTAL GAS SETTING VFR VENT: 30 %
O2/TOTAL GAS SETTING VFR VENT: 30 %
OPIATES UR QL: POSITIVE
P-R INTERVAL, ECG05: 176 MS
PCO2 BLD: 23 MMHG (ref 35–45)
PCO2 BLD: 26.1 MMHG (ref 35–45)
PCO2 BLD: 26.3 MMHG (ref 35–45)
PCO2 BLDA: 29 MMHG (ref 35–45)
PCO2 BLDA: 36 MMHG (ref 35–45)
PCO2 BLDA: 36 MMHG (ref 35–45)
PCP UR QL: NEGATIVE
PEEP RESPIRATORY: 6 CMH2O
PH BLD: 7.43 [PH] (ref 7.35–7.45)
PH BLD: 7.45 [PH] (ref 7.35–7.45)
PH BLD: 7.46 [PH] (ref 7.35–7.45)
PH BLDA: 7.37 [PH] (ref 7.35–7.45)
PH BLDA: 7.4 [PH] (ref 7.35–7.45)
PH BLDA: 7.5 [PH] (ref 7.35–7.45)
PH UR STRIP: 6 [PH] (ref 5–8)
PHOSPHATE SERPL-MCNC: 1.9 MG/DL (ref 2.6–4.7)
PHOSPHATE SERPL-MCNC: 2.3 MG/DL (ref 2.6–4.7)
PHOSPHATE SERPL-MCNC: 2.4 MG/DL (ref 2.6–4.7)
PHOSPHATE SERPL-MCNC: 2.6 MG/DL (ref 2.6–4.7)
PHOSPHATE SERPL-MCNC: 2.7 MG/DL (ref 2.6–4.7)
PHOSPHATE SERPL-MCNC: 3.1 MG/DL (ref 2.6–4.7)
PIP ISTAT,IPIP: 25
PIP ISTAT,IPIP: 30
PLATELET # BLD AUTO: 135 K/UL (ref 150–400)
PLATELET # BLD AUTO: 139 K/UL (ref 150–400)
PLATELET # BLD AUTO: 140 K/UL (ref 150–400)
PLATELET # BLD AUTO: 162 K/UL (ref 150–400)
PLATELET # BLD AUTO: 172 K/UL (ref 150–400)
PLATELET # BLD AUTO: 173 K/UL (ref 150–400)
PLATELET # BLD AUTO: 174 K/UL (ref 150–400)
PLATELET # BLD AUTO: 175 K/UL (ref 150–400)
PLATELET # BLD AUTO: 202 K/UL (ref 150–400)
PLATELET # BLD AUTO: 204 K/UL (ref 150–400)
PLATELET # BLD AUTO: 211 K/UL (ref 150–400)
PLATELET # BLD AUTO: 66 K/UL (ref 150–400)
PLATELET # BLD AUTO: 94 K/UL (ref 150–400)
PMV BLD AUTO: 11.5 FL (ref 8.9–12.9)
PMV BLD AUTO: 11.6 FL (ref 8.9–12.9)
PMV BLD AUTO: 11.7 FL (ref 8.9–12.9)
PMV BLD AUTO: 11.8 FL (ref 8.9–12.9)
PMV BLD AUTO: 11.9 FL (ref 8.9–12.9)
PMV BLD AUTO: 11.9 FL (ref 8.9–12.9)
PMV BLD AUTO: 12 FL (ref 8.9–12.9)
PMV BLD AUTO: 12 FL (ref 8.9–12.9)
PMV BLD AUTO: 12.1 FL (ref 8.9–12.9)
PMV BLD AUTO: 12.3 FL (ref 8.9–12.9)
PMV BLD AUTO: 12.4 FL (ref 8.9–12.9)
PMV BLD AUTO: 12.4 FL (ref 8.9–12.9)
PMV BLD AUTO: 12.6 FL (ref 8.9–12.9)
PO2 BLD: 113 MMHG (ref 80–100)
PO2 BLD: 65 MMHG (ref 80–100)
PO2 BLD: 84 MMHG (ref 80–100)
PO2 BLDA: 157 MMHG (ref 80–100)
PO2 BLDA: 31 MMHG (ref 80–100)
PO2 BLDA: 95 MMHG (ref 80–100)
POTASSIUM SERPL-SCNC: 2.9 MMOL/L (ref 3.5–5.1)
POTASSIUM SERPL-SCNC: 3.3 MMOL/L (ref 3.5–5.1)
POTASSIUM SERPL-SCNC: 3.4 MMOL/L (ref 3.5–5.1)
POTASSIUM SERPL-SCNC: 3.4 MMOL/L (ref 3.5–5.1)
POTASSIUM SERPL-SCNC: 3.5 MMOL/L (ref 3.5–5.1)
POTASSIUM SERPL-SCNC: 3.6 MMOL/L (ref 3.5–5.1)
POTASSIUM SERPL-SCNC: 3.8 MMOL/L (ref 3.5–5.1)
POTASSIUM SERPL-SCNC: 3.9 MMOL/L (ref 3.5–5.1)
POTASSIUM SERPL-SCNC: 4.6 MMOL/L (ref 3.5–5.1)
POTASSIUM SERPL-SCNC: 4.8 MMOL/L (ref 3.5–5.1)
PROT FLD-MCNC: 0.4 G/DL
PROT SERPL-MCNC: 5.9 G/DL (ref 6.4–8.2)
PROT SERPL-MCNC: 6 G/DL (ref 6.4–8.2)
PROT SERPL-MCNC: 6 G/DL (ref 6.4–8.2)
PROT SERPL-MCNC: 6.2 G/DL (ref 6.4–8.2)
PROT SERPL-MCNC: 6.4 G/DL (ref 6.4–8.2)
PROT SERPL-MCNC: 6.4 G/DL (ref 6.4–8.2)
PROT SERPL-MCNC: 7.3 G/DL (ref 6.4–8.2)
PROT SERPL-MCNC: 7.4 G/DL (ref 6.4–8.2)
PROT UR STRIP-MCNC: NEGATIVE MG/DL
PROTHROMBIN TIME: 16.7 SEC (ref 9–11.1)
PROTHROMBIN TIME: 16.9 SEC (ref 9–11.1)
PROTHROMBIN TIME: 17.3 SEC (ref 9–11.1)
PROTHROMBIN TIME: 18.2 SEC (ref 9–11.1)
PROTHROMBIN TIME: 25.9 SEC (ref 9–11.1)
Q-T INTERVAL, ECG07: 326 MS
QRS DURATION, ECG06: 74 MS
QTC CALCULATION (BEZET), ECG08: 472 MS
RBC # BLD AUTO: 1.91 M/UL (ref 4.1–5.7)
RBC # BLD AUTO: 2.04 M/UL (ref 4.1–5.7)
RBC # BLD AUTO: 2.06 M/UL (ref 4.1–5.7)
RBC # BLD AUTO: 2.32 M/UL (ref 4.1–5.7)
RBC # BLD AUTO: 2.43 M/UL (ref 4.1–5.7)
RBC # BLD AUTO: 2.43 M/UL (ref 4.1–5.7)
RBC # BLD AUTO: 2.46 M/UL (ref 4.1–5.7)
RBC # BLD AUTO: 2.53 M/UL (ref 4.1–5.7)
RBC # BLD AUTO: 2.55 M/UL (ref 4.1–5.7)
RBC # BLD AUTO: 2.61 M/UL (ref 4.1–5.7)
RBC # BLD AUTO: 2.63 M/UL (ref 4.1–5.7)
RBC # BLD AUTO: 2.78 M/UL (ref 4.1–5.7)
RBC # BLD AUTO: 3.43 M/UL (ref 4.1–5.7)
RBC # FLD: 48 /CU MM
RBC #/AREA URNS HPF: ABNORMAL /HPF (ref 0–5)
RBC MORPH BLD: ABNORMAL
SAMPLES BEING HELD,HOLD: NORMAL
SAO2 % BLD: 58 % (ref 92–97)
SAO2 % BLD: 94 % (ref 92–97)
SAO2 % BLD: 97 % (ref 92–97)
SAO2 % BLD: 98 % (ref 92–97)
SAO2 % BLD: 99 % (ref 92–97)
SAO2 % BLD: 99 % (ref 92–97)
SAO2% DEVICE SAO2% SENSOR NAME: ABNORMAL
SERVICE CMNT-IMP: ABNORMAL
SERVICE CMNT-IMP: ABNORMAL
SERVICE CMNT-IMP: NORMAL
SODIUM SERPL-SCNC: 134 MMOL/L (ref 136–145)
SODIUM SERPL-SCNC: 136 MMOL/L (ref 136–145)
SODIUM SERPL-SCNC: 137 MMOL/L (ref 136–145)
SODIUM SERPL-SCNC: 143 MMOL/L (ref 136–145)
SODIUM SERPL-SCNC: 145 MMOL/L (ref 136–145)
SODIUM SERPL-SCNC: 145 MMOL/L (ref 136–145)
SODIUM SERPL-SCNC: 148 MMOL/L (ref 136–145)
SODIUM SERPL-SCNC: 148 MMOL/L (ref 136–145)
SODIUM SERPL-SCNC: 149 MMOL/L (ref 136–145)
SODIUM SERPL-SCNC: 150 MMOL/L (ref 136–145)
SODIUM SERPL-SCNC: 152 MMOL/L (ref 136–145)
SODIUM SERPL-SCNC: 154 MMOL/L (ref 136–145)
SP GR UR REFRACTOMETRY: 1.02 (ref 1–1.03)
SP1: ABNORMAL
SP2: ABNORMAL
SP3: ABNORMAL
SPECIMEN EXP DATE BLD: NORMAL
SPECIMEN EXP DATE BLD: NORMAL
SPECIMEN SITE: ABNORMAL
SPECIMEN SOURCE FLD: ABNORMAL
SPECIMEN SOURCE FLD: NORMAL
SPECIMEN TYPE: ABNORMAL
STATUS OF UNIT,%ST: NORMAL
THERAPEUTIC RANGE,PTTT: ABNORMAL SECS (ref 58–77)
THERAPEUTIC RANGE,PTTT: NORMAL SECS (ref 58–77)
TOTAL RESP. RATE, ITRR: 27
TOTAL RESP. RATE, ITRR: 29
TOTAL RESP. RATE, ITRR: 34
UA: UC IF INDICATED,UAUC: ABNORMAL
UNIT DIVISION, %UDIV: 0
UROBILINOGEN UR QL STRIP.AUTO: 2 EU/DL (ref 0.2–1)
VENTILATION MODE VENT: ABNORMAL
VENTRICULAR RATE, ECG03: 126 BPM
VT SETTING VENT: 500 ML
WBC # BLD AUTO: 16.1 K/UL (ref 4.1–11.1)
WBC # BLD AUTO: 17.1 K/UL (ref 4.1–11.1)
WBC # BLD AUTO: 17.9 K/UL (ref 4.1–11.1)
WBC # BLD AUTO: 18.3 K/UL (ref 4.1–11.1)
WBC # BLD AUTO: 20.3 K/UL (ref 4.1–11.1)
WBC # BLD AUTO: 23.1 K/UL (ref 4.1–11.1)
WBC # BLD AUTO: 25.9 K/UL (ref 4.1–11.1)
WBC # BLD AUTO: 26.9 K/UL (ref 4.1–11.1)
WBC # BLD AUTO: 29.3 K/UL (ref 4.1–11.1)
WBC # BLD AUTO: 32.1 K/UL (ref 4.1–11.1)
WBC # BLD AUTO: 33.8 K/UL (ref 4.1–11.1)
WBC # BLD AUTO: 4.1 K/UL (ref 4.1–11.1)
WBC # BLD AUTO: 7.7 K/UL (ref 4.1–11.1)
WBC URNS QL MICRO: ABNORMAL /HPF (ref 0–4)

## 2019-01-01 PROCEDURE — 87040 BLOOD CULTURE FOR BACTERIA: CPT

## 2019-01-01 PROCEDURE — C9113 INJ PANTOPRAZOLE SODIUM, VIA: HCPCS | Performed by: STUDENT IN AN ORGANIZED HEALTH CARE EDUCATION/TRAINING PROGRAM

## 2019-01-01 PROCEDURE — 77030026438 HC STYL ET INTUB CARD -A: Performed by: NURSE ANESTHETIST, CERTIFIED REGISTERED

## 2019-01-01 PROCEDURE — 89050 BODY FLUID CELL COUNT: CPT

## 2019-01-01 PROCEDURE — 74011000250 HC RX REV CODE- 250: Performed by: INTERNAL MEDICINE

## 2019-01-01 PROCEDURE — 74011250636 HC RX REV CODE- 250/636: Performed by: FAMILY MEDICINE

## 2019-01-01 PROCEDURE — 36600 WITHDRAWAL OF ARTERIAL BLOOD: CPT

## 2019-01-01 PROCEDURE — 36415 COLL VENOUS BLD VENIPUNCTURE: CPT

## 2019-01-01 PROCEDURE — 82140 ASSAY OF AMMONIA: CPT

## 2019-01-01 PROCEDURE — 74011000258 HC RX REV CODE- 258: Performed by: INTERNAL MEDICINE

## 2019-01-01 PROCEDURE — 74011250637 HC RX REV CODE- 250/637: Performed by: INTERNAL MEDICINE

## 2019-01-01 PROCEDURE — 71045 X-RAY EXAM CHEST 1 VIEW: CPT

## 2019-01-01 PROCEDURE — 74011250636 HC RX REV CODE- 250/636: Performed by: NEUROMUSCULOSKELETAL MEDICINE & OMM

## 2019-01-01 PROCEDURE — 80053 COMPREHEN METABOLIC PANEL: CPT

## 2019-01-01 PROCEDURE — 65620000000 HC RM CCU GENERAL

## 2019-01-01 PROCEDURE — 3E0G76Z INTRODUCTION OF NUTRITIONAL SUBSTANCE INTO UPPER GI, VIA NATURAL OR ARTIFICIAL OPENING: ICD-10-PCS | Performed by: INTERNAL MEDICINE

## 2019-01-01 PROCEDURE — 94003 VENT MGMT INPAT SUBQ DAY: CPT

## 2019-01-01 PROCEDURE — 65610000006 HC RM INTENSIVE CARE

## 2019-01-01 PROCEDURE — 74011250636 HC RX REV CODE- 250/636: Performed by: INTERNAL MEDICINE

## 2019-01-01 PROCEDURE — 82803 BLOOD GASES ANY COMBINATION: CPT

## 2019-01-01 PROCEDURE — 83690 ASSAY OF LIPASE: CPT

## 2019-01-01 PROCEDURE — 86900 BLOOD TYPING SEROLOGIC ABO: CPT

## 2019-01-01 PROCEDURE — C9113 INJ PANTOPRAZOLE SODIUM, VIA: HCPCS | Performed by: INTERNAL MEDICINE

## 2019-01-01 PROCEDURE — 87804 INFLUENZA ASSAY W/OPTIC: CPT

## 2019-01-01 PROCEDURE — 87205 SMEAR GRAM STAIN: CPT

## 2019-01-01 PROCEDURE — 70450 CT HEAD/BRAIN W/O DYE: CPT

## 2019-01-01 PROCEDURE — 74011000250 HC RX REV CODE- 250: Performed by: HOSPITALIST

## 2019-01-01 PROCEDURE — 85025 COMPLETE CBC W/AUTO DIFF WBC: CPT

## 2019-01-01 PROCEDURE — 77030010896 HC CIRC VENT TRNSPT TRAN -B

## 2019-01-01 PROCEDURE — 74011250637 HC RX REV CODE- 250/637: Performed by: HOSPITALIST

## 2019-01-01 PROCEDURE — P9047 ALBUMIN (HUMAN), 25%, 50ML: HCPCS | Performed by: INTERNAL MEDICINE

## 2019-01-01 PROCEDURE — 74176 CT ABD & PELVIS W/O CONTRAST: CPT

## 2019-01-01 PROCEDURE — 85730 THROMBOPLASTIN TIME PARTIAL: CPT

## 2019-01-01 PROCEDURE — 87522 HEPATITIS C REVRS TRNSCRPJ: CPT

## 2019-01-01 PROCEDURE — 82945 GLUCOSE OTHER FLUID: CPT

## 2019-01-01 PROCEDURE — 83605 ASSAY OF LACTIC ACID: CPT

## 2019-01-01 PROCEDURE — 82105 ALPHA-FETOPROTEIN SERUM: CPT

## 2019-01-01 PROCEDURE — 85610 PROTHROMBIN TIME: CPT

## 2019-01-01 PROCEDURE — 93970 EXTREMITY STUDY: CPT

## 2019-01-01 PROCEDURE — 85018 HEMOGLOBIN: CPT

## 2019-01-01 PROCEDURE — 77030018846 HC SOL IRR STRL H20 ICUM -A

## 2019-01-01 PROCEDURE — 49083 ABD PARACENTESIS W/IMAGING: CPT

## 2019-01-01 PROCEDURE — 74011000258 HC RX REV CODE- 258: Performed by: NEUROMUSCULOSKELETAL MEDICINE & OMM

## 2019-01-01 PROCEDURE — 87015 SPECIMEN INFECT AGNT CONCNTJ: CPT

## 2019-01-01 PROCEDURE — 99284 EMERGENCY DEPT VISIT MOD MDM: CPT

## 2019-01-01 PROCEDURE — 85027 COMPLETE CBC AUTOMATED: CPT

## 2019-01-01 PROCEDURE — 81001 URINALYSIS AUTO W/SCOPE: CPT

## 2019-01-01 PROCEDURE — 80048 BASIC METABOLIC PNL TOTAL CA: CPT

## 2019-01-01 PROCEDURE — 85384 FIBRINOGEN ACTIVITY: CPT

## 2019-01-01 PROCEDURE — 76060000032 HC ANESTHESIA 0.5 TO 1 HR: Performed by: SPECIALIST

## 2019-01-01 PROCEDURE — 76700 US EXAM ABDOM COMPLETE: CPT

## 2019-01-01 PROCEDURE — 74011250637 HC RX REV CODE- 250/637: Performed by: SPECIALIST

## 2019-01-01 PROCEDURE — 82247 BILIRUBIN TOTAL: CPT

## 2019-01-01 PROCEDURE — 87086 URINE CULTURE/COLONY COUNT: CPT

## 2019-01-01 PROCEDURE — 74011250636 HC RX REV CODE- 250/636: Performed by: HOSPITALIST

## 2019-01-01 PROCEDURE — 80307 DRUG TEST PRSMV CHEM ANLYZR: CPT

## 2019-01-01 PROCEDURE — 74011250636 HC RX REV CODE- 250/636: Performed by: SPECIALIST

## 2019-01-01 PROCEDURE — 65270000029 HC RM PRIVATE

## 2019-01-01 PROCEDURE — 74011250636 HC RX REV CODE- 250/636: Performed by: STUDENT IN AN ORGANIZED HEALTH CARE EDUCATION/TRAINING PROGRAM

## 2019-01-01 PROCEDURE — 99285 EMERGENCY DEPT VISIT HI MDM: CPT

## 2019-01-01 PROCEDURE — 74018 RADEX ABDOMEN 1 VIEW: CPT

## 2019-01-01 PROCEDURE — 83735 ASSAY OF MAGNESIUM: CPT

## 2019-01-01 PROCEDURE — 83615 LACTATE (LD) (LDH) ENZYME: CPT

## 2019-01-01 PROCEDURE — 86141 C-REACTIVE PROTEIN HS: CPT

## 2019-01-01 PROCEDURE — 84100 ASSAY OF PHOSPHORUS: CPT

## 2019-01-01 PROCEDURE — 73562 X-RAY EXAM OF KNEE 3: CPT

## 2019-01-01 PROCEDURE — 76040000007: Performed by: SPECIALIST

## 2019-01-01 PROCEDURE — 77030008684 HC TU ET CUF COVD -B: Performed by: NURSE ANESTHETIST, CERTIFIED REGISTERED

## 2019-01-01 PROCEDURE — 74011250637 HC RX REV CODE- 250/637: Performed by: NEUROMUSCULOSKELETAL MEDICINE & OMM

## 2019-01-01 PROCEDURE — 36430 TRANSFUSION BLD/BLD COMPNT: CPT

## 2019-01-01 PROCEDURE — 06L38CZ OCCLUSION OF ESOPHAGEAL VEIN WITH EXTRALUMINAL DEVICE, VIA NATURAL OR ARTIFICIAL OPENING ENDOSCOPIC: ICD-10-PCS | Performed by: SPECIALIST

## 2019-01-01 PROCEDURE — 84450 TRANSFERASE (AST) (SGOT): CPT

## 2019-01-01 PROCEDURE — 76705 ECHO EXAM OF ABDOMEN: CPT

## 2019-01-01 PROCEDURE — 80074 ACUTE HEPATITIS PANEL: CPT

## 2019-01-01 PROCEDURE — 87070 CULTURE OTHR SPECIMN AEROBIC: CPT

## 2019-01-01 PROCEDURE — 74011000258 HC RX REV CODE- 258: Performed by: STUDENT IN AN ORGANIZED HEALTH CARE EDUCATION/TRAINING PROGRAM

## 2019-01-01 PROCEDURE — 74011000250 HC RX REV CODE- 250: Performed by: FAMILY MEDICINE

## 2019-01-01 PROCEDURE — P9016 RBC LEUKOCYTES REDUCED: HCPCS

## 2019-01-01 PROCEDURE — 77030018798 HC PMP KT ENTRL FED COVD -A

## 2019-01-01 PROCEDURE — 96361 HYDRATE IV INFUSION ADD-ON: CPT

## 2019-01-01 PROCEDURE — 75810000137 HC PLCMT CENT VENOUS CATH

## 2019-01-01 PROCEDURE — 99283 EMERGENCY DEPT VISIT LOW MDM: CPT

## 2019-01-01 PROCEDURE — P9059 PLASMA, FRZ BETWEEN 8-24HOUR: HCPCS

## 2019-01-01 PROCEDURE — 5A1955Z RESPIRATORY VENTILATION, GREATER THAN 96 CONSECUTIVE HOURS: ICD-10-PCS | Performed by: INTERNAL MEDICINE

## 2019-01-01 PROCEDURE — 0W3P8ZZ CONTROL BLEEDING IN GASTROINTESTINAL TRACT, VIA NATURAL OR ARTIFICIAL OPENING ENDOSCOPIC: ICD-10-PCS | Performed by: SPECIALIST

## 2019-01-01 PROCEDURE — 93005 ELECTROCARDIOGRAM TRACING: CPT

## 2019-01-01 PROCEDURE — 30233L1 TRANSFUSION OF NONAUTOLOGOUS FRESH PLASMA INTO PERIPHERAL VEIN, PERCUTANEOUS APPROACH: ICD-10-PCS | Performed by: INTERNAL MEDICINE

## 2019-01-01 PROCEDURE — 96374 THER/PROPH/DIAG INJ IV PUSH: CPT

## 2019-01-01 PROCEDURE — 71046 X-RAY EXAM CHEST 2 VIEWS: CPT

## 2019-01-01 PROCEDURE — 77030010547 HC BG URIN W/UMETER -A

## 2019-01-01 PROCEDURE — 86923 COMPATIBILITY TEST ELECTRIC: CPT

## 2019-01-01 PROCEDURE — 77030014243 HC BND LIG VRCES BSC -D: Performed by: SPECIALIST

## 2019-01-01 PROCEDURE — 94002 VENT MGMT INPAT INIT DAY: CPT

## 2019-01-01 PROCEDURE — 82042 OTHER SOURCE ALBUMIN QUAN EA: CPT

## 2019-01-01 PROCEDURE — 85652 RBC SED RATE AUTOMATED: CPT

## 2019-01-01 PROCEDURE — 84157 ASSAY OF PROTEIN OTHER: CPT

## 2019-01-01 PROCEDURE — 77030019563 HC DEV ATTCH FEED HOLL -A

## 2019-01-01 PROCEDURE — 74011250636 HC RX REV CODE- 250/636: Performed by: EMERGENCY MEDICINE

## 2019-01-01 PROCEDURE — 77030032490 HC SLV COMPR SCD KNE COVD -B

## 2019-01-01 PROCEDURE — 77030013797 HC KT TRNSDUC PRSSR EDWD -A

## 2019-01-01 PROCEDURE — 77030020291 HC FLEXSEAL FMS BMS -C

## 2019-01-01 PROCEDURE — 30233N1 TRANSFUSION OF NONAUTOLOGOUS RED BLOOD CELLS INTO PERIPHERAL VEIN, PERCUTANEOUS APPROACH: ICD-10-PCS | Performed by: INTERNAL MEDICINE

## 2019-01-01 PROCEDURE — C1751 CATH, INF, PER/CENT/MIDLINE: HCPCS

## 2019-01-01 PROCEDURE — 82962 GLUCOSE BLOOD TEST: CPT

## 2019-01-01 PROCEDURE — 87075 CULTR BACTERIA EXCEPT BLOOD: CPT

## 2019-01-01 PROCEDURE — 74011000258 HC RX REV CODE- 258: Performed by: HOSPITALIST

## 2019-01-01 PROCEDURE — 74011250636 HC RX REV CODE- 250/636

## 2019-01-01 RX ORDER — LORAZEPAM 1 MG/1
1 TABLET ORAL
Status: DISCONTINUED | OUTPATIENT
Start: 2019-01-01 | End: 2019-01-01

## 2019-01-01 RX ORDER — BUMETANIDE 1 MG/1
1 TABLET ORAL DAILY
Qty: 30 TAB | Refills: 3 | Status: SHIPPED | OUTPATIENT
Start: 2019-01-01

## 2019-01-01 RX ORDER — SODIUM CHLORIDE 0.9 % (FLUSH) 0.9 %
5-10 SYRINGE (ML) INJECTION AS NEEDED
Status: DISCONTINUED | OUTPATIENT
Start: 2019-01-01 | End: 2019-01-01 | Stop reason: HOSPADM

## 2019-01-01 RX ORDER — IBUPROFEN 200 MG
1 TABLET ORAL DAILY
Status: DISCONTINUED | OUTPATIENT
Start: 2019-01-01 | End: 2019-01-01 | Stop reason: HOSPADM

## 2019-01-01 RX ORDER — ONDANSETRON 2 MG/ML
4 INJECTION INTRAMUSCULAR; INTRAVENOUS
Status: DISCONTINUED | OUTPATIENT
Start: 2019-01-01 | End: 2019-01-01 | Stop reason: ALTCHOICE

## 2019-01-01 RX ORDER — LIDOCAINE HYDROCHLORIDE 20 MG/ML
INJECTION, SOLUTION EPIDURAL; INFILTRATION; INTRACAUDAL; PERINEURAL AS NEEDED
Status: DISCONTINUED | OUTPATIENT
Start: 2019-01-01 | End: 2019-01-01 | Stop reason: HOSPADM

## 2019-01-01 RX ORDER — SODIUM CHLORIDE 9 MG/ML
INJECTION, SOLUTION INTRAVENOUS
Status: DISCONTINUED | OUTPATIENT
Start: 2019-01-01 | End: 2019-01-01 | Stop reason: HOSPADM

## 2019-01-01 RX ORDER — LORAZEPAM 1 MG/1
2 TABLET ORAL
Status: DISCONTINUED | OUTPATIENT
Start: 2019-01-01 | End: 2019-01-01

## 2019-01-01 RX ORDER — SODIUM CHLORIDE 0.9 % (FLUSH) 0.9 %
5-40 SYRINGE (ML) INJECTION AS NEEDED
Status: DISCONTINUED | OUTPATIENT
Start: 2019-01-01 | End: 2019-02-11 | Stop reason: HOSPADM

## 2019-01-01 RX ORDER — SODIUM CHLORIDE 9 MG/ML
125 INJECTION, SOLUTION INTRAVENOUS CONTINUOUS
Status: DISCONTINUED | OUTPATIENT
Start: 2019-01-01 | End: 2019-01-01

## 2019-01-01 RX ORDER — SODIUM CHLORIDE 0.9 % (FLUSH) 0.9 %
5-40 SYRINGE (ML) INJECTION EVERY 8 HOURS
Status: DISCONTINUED | OUTPATIENT
Start: 2019-01-01 | End: 2019-01-01 | Stop reason: ALTCHOICE

## 2019-01-01 RX ORDER — SODIUM CHLORIDE 9 MG/ML
250 INJECTION, SOLUTION INTRAVENOUS AS NEEDED
Status: DISCONTINUED | OUTPATIENT
Start: 2019-01-01 | End: 2019-01-01 | Stop reason: ALTCHOICE

## 2019-01-01 RX ORDER — ACETAMINOPHEN 650 MG/1
650 SUPPOSITORY RECTAL
Status: DISCONTINUED | OUTPATIENT
Start: 2019-01-01 | End: 2019-02-11 | Stop reason: HOSPADM

## 2019-01-01 RX ORDER — LORAZEPAM 2 MG/ML
1 INJECTION INTRAMUSCULAR
Status: DISCONTINUED | OUTPATIENT
Start: 2019-01-01 | End: 2019-02-11 | Stop reason: HOSPADM

## 2019-01-01 RX ORDER — ONDANSETRON 2 MG/ML
4 INJECTION INTRAMUSCULAR; INTRAVENOUS
Status: COMPLETED | OUTPATIENT
Start: 2019-01-01 | End: 2019-01-01

## 2019-01-01 RX ORDER — CEPHALEXIN 500 MG/1
500 CAPSULE ORAL 4 TIMES DAILY
Qty: 28 CAP | Refills: 0 | Status: SHIPPED | OUTPATIENT
Start: 2019-01-01 | End: 2019-01-01

## 2019-01-01 RX ORDER — POTASSIUM CHLORIDE 1500 MG/1
20 TABLET, FILM COATED, EXTENDED RELEASE ORAL DAILY
Qty: 90 TAB | Refills: 3 | Status: SHIPPED | OUTPATIENT
Start: 2019-01-01

## 2019-01-01 RX ORDER — CEPHALEXIN 500 MG/1
500 CAPSULE ORAL ONCE
Status: DISCONTINUED | OUTPATIENT
Start: 2019-01-01 | End: 2019-01-01

## 2019-01-01 RX ORDER — SODIUM CHLORIDE 9 MG/ML
250 INJECTION, SOLUTION INTRAVENOUS AS NEEDED
Status: DISCONTINUED | OUTPATIENT
Start: 2019-01-01 | End: 2019-01-01

## 2019-01-01 RX ORDER — SODIUM CHLORIDE 0.9 % (FLUSH) 0.9 %
5-40 SYRINGE (ML) INJECTION AS NEEDED
Status: DISCONTINUED | OUTPATIENT
Start: 2019-01-01 | End: 2019-01-01 | Stop reason: ALTCHOICE

## 2019-01-01 RX ORDER — LORAZEPAM 2 MG/ML
INJECTION INTRAMUSCULAR
Status: DISPENSED
Start: 2019-01-01 | End: 2019-01-01

## 2019-01-01 RX ORDER — VANCOMYCIN 2 GRAM/500 ML IN 0.9 % SODIUM CHLORIDE INTRAVENOUS
2000
Status: COMPLETED | OUTPATIENT
Start: 2019-01-01 | End: 2019-01-01

## 2019-01-01 RX ORDER — TRAMADOL HYDROCHLORIDE 50 MG/1
50 TABLET ORAL
Status: DISCONTINUED | OUTPATIENT
Start: 2019-01-01 | End: 2019-01-01 | Stop reason: HOSPADM

## 2019-01-01 RX ORDER — NOREPINEPHRINE BITARTRATE/D5W 8 MG/250ML
2-30 PLASTIC BAG, INJECTION (ML) INTRAVENOUS
Status: DISCONTINUED | OUTPATIENT
Start: 2019-01-01 | End: 2019-01-01 | Stop reason: SDUPTHER

## 2019-01-01 RX ORDER — DOCUSATE SODIUM 100 MG/1
100 CAPSULE, LIQUID FILLED ORAL 2 TIMES DAILY
Status: DISCONTINUED | OUTPATIENT
Start: 2019-01-01 | End: 2019-01-01 | Stop reason: HOSPADM

## 2019-01-01 RX ORDER — SODIUM CHLORIDE 9 MG/ML
250 INJECTION, SOLUTION INTRAVENOUS AS NEEDED
Status: DISCONTINUED | OUTPATIENT
Start: 2019-01-01 | End: 2019-02-11 | Stop reason: HOSPADM

## 2019-01-01 RX ORDER — POTASSIUM CHLORIDE 29.8 MG/ML
20 INJECTION INTRAVENOUS ONCE
Status: COMPLETED | OUTPATIENT
Start: 2019-01-01 | End: 2019-01-01

## 2019-01-01 RX ORDER — METOPROLOL TARTRATE 5 MG/5ML
5 INJECTION INTRAVENOUS EVERY 6 HOURS
Status: DISCONTINUED | OUTPATIENT
Start: 2019-01-01 | End: 2019-01-01

## 2019-01-01 RX ORDER — POTASSIUM CHLORIDE 750 MG/1
40 TABLET, FILM COATED, EXTENDED RELEASE ORAL ONCE
Status: COMPLETED | OUTPATIENT
Start: 2019-01-01 | End: 2019-01-01

## 2019-01-01 RX ORDER — POTASSIUM CHLORIDE 750 MG/1
40 TABLET, FILM COATED, EXTENDED RELEASE ORAL EVERY 4 HOURS
Status: DISPENSED | OUTPATIENT
Start: 2019-01-01 | End: 2019-01-01

## 2019-01-01 RX ORDER — SODIUM CHLORIDE 0.9 % (FLUSH) 0.9 %
5-10 SYRINGE (ML) INJECTION EVERY 8 HOURS
Status: DISCONTINUED | OUTPATIENT
Start: 2019-01-01 | End: 2019-01-01 | Stop reason: HOSPADM

## 2019-01-01 RX ORDER — METRONIDAZOLE 500 MG/100ML
500 INJECTION, SOLUTION INTRAVENOUS EVERY 8 HOURS
Status: DISCONTINUED | OUTPATIENT
Start: 2019-01-01 | End: 2019-01-01

## 2019-01-01 RX ORDER — CHLORHEXIDINE GLUCONATE 1.2 MG/ML
15 RINSE ORAL EVERY 12 HOURS
Status: DISCONTINUED | OUTPATIENT
Start: 2019-01-01 | End: 2019-02-11 | Stop reason: HOSPADM

## 2019-01-01 RX ORDER — PEPPERMINT OIL
SPIRIT ORAL ONCE
Status: COMPLETED | OUTPATIENT
Start: 2019-01-01 | End: 2019-01-01

## 2019-01-01 RX ORDER — LACTULOSE 10 G/15ML
200 SOLUTION ORAL; RECTAL EVERY 6 HOURS
Status: DISCONTINUED | OUTPATIENT
Start: 2019-01-01 | End: 2019-01-01

## 2019-01-01 RX ORDER — CLONIDINE HYDROCHLORIDE 0.2 MG/1
0.2 TABLET ORAL 2 TIMES DAILY
Status: DISCONTINUED | OUTPATIENT
Start: 2019-01-01 | End: 2019-01-01 | Stop reason: HOSPADM

## 2019-01-01 RX ORDER — ALBUMIN HUMAN 250 G/1000ML
25 SOLUTION INTRAVENOUS EVERY 6 HOURS
Status: COMPLETED | OUTPATIENT
Start: 2019-01-01 | End: 2019-01-01

## 2019-01-01 RX ORDER — LIDOCAINE HYDROCHLORIDE 10 MG/ML
10 INJECTION, SOLUTION EPIDURAL; INFILTRATION; INTRACAUDAL; PERINEURAL
Status: DISPENSED | OUTPATIENT
Start: 2019-01-01 | End: 2019-01-01

## 2019-01-01 RX ORDER — LEVOFLOXACIN 5 MG/ML
750 INJECTION, SOLUTION INTRAVENOUS
Status: DISCONTINUED | OUTPATIENT
Start: 2019-01-01 | End: 2019-02-11 | Stop reason: HOSPADM

## 2019-01-01 RX ORDER — SULFAMETHOXAZOLE AND TRIMETHOPRIM 800; 160 MG/1; MG/1
1 TABLET ORAL EVERY 12 HOURS
Status: DISCONTINUED | OUTPATIENT
Start: 2019-01-01 | End: 2019-01-01

## 2019-01-01 RX ORDER — DEXTROMETHORPHAN/PSEUDOEPHED 2.5-7.5/.8
1.2 DROPS ORAL
Status: DISCONTINUED | OUTPATIENT
Start: 2019-01-01 | End: 2019-01-01 | Stop reason: ALTCHOICE

## 2019-01-01 RX ORDER — SUCCINYLCHOLINE CHLORIDE 20 MG/ML
INJECTION INTRAMUSCULAR; INTRAVENOUS AS NEEDED
Status: DISCONTINUED | OUTPATIENT
Start: 2019-01-01 | End: 2019-01-01 | Stop reason: HOSPADM

## 2019-01-01 RX ORDER — MORPHINE SULFATE 10 MG/ML
10 INJECTION, SOLUTION INTRAMUSCULAR; INTRAVENOUS
Status: DISCONTINUED | OUTPATIENT
Start: 2019-01-01 | End: 2019-02-11 | Stop reason: HOSPADM

## 2019-01-01 RX ORDER — BUMETANIDE 1 MG/1
1 TABLET ORAL DAILY
Status: DISCONTINUED | OUTPATIENT
Start: 2019-01-01 | End: 2019-01-01 | Stop reason: HOSPADM

## 2019-01-01 RX ORDER — LEVOFLOXACIN 5 MG/ML
750 INJECTION, SOLUTION INTRAVENOUS
Status: DISCONTINUED | OUTPATIENT
Start: 2019-01-01 | End: 2019-01-01 | Stop reason: HOSPADM

## 2019-01-01 RX ORDER — IBUPROFEN 400 MG/1
400 TABLET ORAL
Status: DISCONTINUED | OUTPATIENT
Start: 2019-01-01 | End: 2019-01-01 | Stop reason: HOSPADM

## 2019-01-01 RX ORDER — MUPIROCIN 20 MG/G
OINTMENT TOPICAL 2 TIMES DAILY
Status: COMPLETED | OUTPATIENT
Start: 2019-01-01 | End: 2019-01-01

## 2019-01-01 RX ORDER — DEXAMETHASONE SODIUM PHOSPHATE 4 MG/ML
2 INJECTION, SOLUTION INTRA-ARTICULAR; INTRALESIONAL; INTRAMUSCULAR; INTRAVENOUS; SOFT TISSUE
Status: DISCONTINUED | OUTPATIENT
Start: 2019-01-01 | End: 2019-02-11 | Stop reason: HOSPADM

## 2019-01-01 RX ORDER — HYDRALAZINE HYDROCHLORIDE 20 MG/ML
10 INJECTION INTRAMUSCULAR; INTRAVENOUS
Status: DISCONTINUED | OUTPATIENT
Start: 2019-01-01 | End: 2019-02-11 | Stop reason: HOSPADM

## 2019-01-01 RX ORDER — ZOLPIDEM TARTRATE 5 MG/1
5 TABLET ORAL
Status: DISCONTINUED | OUTPATIENT
Start: 2019-01-01 | End: 2019-01-01 | Stop reason: HOSPADM

## 2019-01-01 RX ORDER — CLONIDINE HYDROCHLORIDE 0.2 MG/1
0.2 TABLET ORAL 2 TIMES DAILY
Qty: 30 TAB | Refills: 2 | Status: ON HOLD | OUTPATIENT
Start: 2019-01-01 | End: 2019-01-01 | Stop reason: CLARIF

## 2019-01-01 RX ORDER — OXYCODONE HYDROCHLORIDE 5 MG/1
5 TABLET ORAL
Status: DISCONTINUED | OUTPATIENT
Start: 2019-01-01 | End: 2019-01-01

## 2019-01-01 RX ORDER — DEXTROSE MONOHYDRATE AND SODIUM CHLORIDE 5; .225 G/100ML; G/100ML
150 INJECTION, SOLUTION INTRAVENOUS CONTINUOUS
Status: DISCONTINUED | OUTPATIENT
Start: 2019-01-01 | End: 2019-01-01

## 2019-01-01 RX ORDER — IBUPROFEN 200 MG
1 TABLET ORAL DAILY
Status: DISCONTINUED | OUTPATIENT
Start: 2019-01-01 | End: 2019-02-11 | Stop reason: HOSPADM

## 2019-01-01 RX ORDER — ENOXAPARIN SODIUM 100 MG/ML
40 INJECTION SUBCUTANEOUS EVERY 24 HOURS
Status: CANCELLED | OUTPATIENT
Start: 2019-01-01

## 2019-01-01 RX ORDER — GABAPENTIN 100 MG/1
100 CAPSULE ORAL 2 TIMES DAILY
Status: DISCONTINUED | OUTPATIENT
Start: 2019-01-01 | End: 2019-01-01 | Stop reason: HOSPADM

## 2019-01-01 RX ORDER — GABAPENTIN 100 MG/1
100 CAPSULE ORAL 2 TIMES DAILY
Qty: 30 CAP | Refills: 2 | Status: ON HOLD | OUTPATIENT
Start: 2019-01-01 | End: 2019-01-01

## 2019-01-01 RX ORDER — GLYCOPYRROLATE 0.2 MG/ML
0.2 INJECTION INTRAMUSCULAR; INTRAVENOUS
Status: DISCONTINUED | OUTPATIENT
Start: 2019-01-01 | End: 2019-02-11 | Stop reason: HOSPADM

## 2019-01-01 RX ORDER — NALOXONE HYDROCHLORIDE 0.4 MG/ML
0.4 INJECTION, SOLUTION INTRAMUSCULAR; INTRAVENOUS; SUBCUTANEOUS AS NEEDED
Status: DISCONTINUED | OUTPATIENT
Start: 2019-01-01 | End: 2019-01-01 | Stop reason: HOSPADM

## 2019-01-01 RX ORDER — PROPOFOL 10 MG/ML
INJECTION, EMULSION INTRAVENOUS AS NEEDED
Status: DISCONTINUED | OUTPATIENT
Start: 2019-01-01 | End: 2019-01-01 | Stop reason: HOSPADM

## 2019-01-01 RX ORDER — SODIUM CHLORIDE 9 MG/ML
50 INJECTION, SOLUTION INTRAVENOUS CONTINUOUS
Status: DISCONTINUED | OUTPATIENT
Start: 2019-01-01 | End: 2019-01-01 | Stop reason: ALTCHOICE

## 2019-01-01 RX ORDER — ONDANSETRON 2 MG/ML
4 INJECTION INTRAMUSCULAR; INTRAVENOUS
Status: DISCONTINUED | OUTPATIENT
Start: 2019-01-01 | End: 2019-01-01 | Stop reason: HOSPADM

## 2019-01-01 RX ORDER — PROPOFOL 10 MG/ML
0-50 VIAL (ML) INTRAVENOUS
Status: DISCONTINUED | OUTPATIENT
Start: 2019-01-01 | End: 2019-01-01

## 2019-01-01 RX ORDER — PROPOFOL 10 MG/ML
INJECTION, EMULSION INTRAVENOUS
Status: DISCONTINUED | OUTPATIENT
Start: 2019-01-01 | End: 2019-01-01 | Stop reason: HOSPADM

## 2019-01-01 RX ORDER — LEVOFLOXACIN 5 MG/ML
750 INJECTION, SOLUTION INTRAVENOUS EVERY 24 HOURS
Status: DISCONTINUED | OUTPATIENT
Start: 2019-01-01 | End: 2019-01-01

## 2019-01-01 RX ORDER — METHADONE HYDROCHLORIDE 10 MG/1
30 TABLET ORAL ONCE
Status: COMPLETED | OUTPATIENT
Start: 2019-01-01 | End: 2019-01-01

## 2019-01-01 RX ORDER — SODIUM CHLORIDE 0.9 % (FLUSH) 0.9 %
5-40 SYRINGE (ML) INJECTION EVERY 8 HOURS
Status: DISCONTINUED | OUTPATIENT
Start: 2019-01-01 | End: 2019-02-11 | Stop reason: HOSPADM

## 2019-01-01 RX ORDER — LORAZEPAM 2 MG/ML
2 INJECTION INTRAMUSCULAR
Status: DISCONTINUED | OUTPATIENT
Start: 2019-01-01 | End: 2019-02-11 | Stop reason: HOSPADM

## 2019-01-01 RX ORDER — POLYETHYLENE GLYCOL 3350 17 G/17G
17 POWDER, FOR SOLUTION ORAL DAILY
Status: DISCONTINUED | OUTPATIENT
Start: 2019-01-01 | End: 2019-01-01 | Stop reason: HOSPADM

## 2019-01-01 RX ORDER — IBUPROFEN 200 MG
1 TABLET ORAL DAILY
Status: DISCONTINUED | OUTPATIENT
Start: 2019-01-01 | End: 2019-01-01

## 2019-01-01 RX ORDER — DIPHENHYDRAMINE HCL 25 MG
25 CAPSULE ORAL
Status: DISCONTINUED | OUTPATIENT
Start: 2019-01-01 | End: 2019-01-01 | Stop reason: HOSPADM

## 2019-01-01 RX ORDER — OXYCODONE AND ACETAMINOPHEN 5; 325 MG/1; MG/1
1 TABLET ORAL
Status: DISCONTINUED | OUTPATIENT
Start: 2019-01-01 | End: 2019-01-01 | Stop reason: HOSPADM

## 2019-01-01 RX ORDER — VANCOMYCIN 2 GRAM/500 ML IN 0.9 % SODIUM CHLORIDE INTRAVENOUS
2000
Status: DISCONTINUED | OUTPATIENT
Start: 2019-01-01 | End: 2019-01-01

## 2019-01-01 RX ORDER — LORAZEPAM 2 MG/ML
4 INJECTION INTRAMUSCULAR
Status: DISCONTINUED | OUTPATIENT
Start: 2019-01-01 | End: 2019-02-11 | Stop reason: HOSPADM

## 2019-01-01 RX ORDER — METRONIDAZOLE 500 MG/100ML
500 INJECTION, SOLUTION INTRAVENOUS EVERY 12 HOURS
Status: DISCONTINUED | OUTPATIENT
Start: 2019-01-01 | End: 2019-02-11 | Stop reason: HOSPADM

## 2019-01-01 RX ORDER — ACETAMINOPHEN 650 MG/1
650 SUPPOSITORY RECTAL
Status: DISCONTINUED | OUTPATIENT
Start: 2019-01-01 | End: 2019-01-01

## 2019-01-01 RX ORDER — POTASSIUM CHLORIDE 750 MG/1
40 TABLET, FILM COATED, EXTENDED RELEASE ORAL
Status: COMPLETED | OUTPATIENT
Start: 2019-01-01 | End: 2019-01-01

## 2019-01-01 RX ADMIN — METOPROLOL TARTRATE 5 MG: 5 INJECTION, SOLUTION INTRAVENOUS at 05:03

## 2019-01-01 RX ADMIN — VANCOMYCIN HYDROCHLORIDE 2000 MG: 10 INJECTION, POWDER, LYOPHILIZED, FOR SOLUTION INTRAVENOUS at 16:33

## 2019-01-01 RX ADMIN — CHLORHEXIDINE GLUCONATE 15 ML: 1.2 RINSE ORAL at 08:33

## 2019-01-01 RX ADMIN — PROPOFOL 50 MCG/KG/MIN: 10 INJECTION, EMULSION INTRAVENOUS at 14:12

## 2019-01-01 RX ADMIN — LACTULOSE 200 G: 10 SOLUTION ORAL at 12:06

## 2019-01-01 RX ADMIN — POTASSIUM CHLORIDE 20 MEQ: 400 INJECTION, SOLUTION INTRAVENOUS at 13:55

## 2019-01-01 RX ADMIN — MUPIROCIN: 20 OINTMENT TOPICAL at 19:03

## 2019-01-01 RX ADMIN — LORAZEPAM 2 MG: 2 INJECTION INTRAMUSCULAR; INTRAVENOUS at 21:14

## 2019-01-01 RX ADMIN — CHLORHEXIDINE GLUCONATE 15 ML: 1.2 RINSE ORAL at 08:10

## 2019-01-01 RX ADMIN — OCTREOTIDE ACETATE 50 MCG/HR: 500 INJECTION, SOLUTION INTRAVENOUS; SUBCUTANEOUS at 09:02

## 2019-01-01 RX ADMIN — FOLIC ACID: 5 INJECTION, SOLUTION INTRAMUSCULAR; INTRAVENOUS; SUBCUTANEOUS at 08:48

## 2019-01-01 RX ADMIN — POLYETHYLENE GLYCOL 3350 17 G: 17 POWDER, FOR SOLUTION ORAL at 09:15

## 2019-01-01 RX ADMIN — LIDOCAINE HYDROCHLORIDE 1 G: 10 INJECTION, SOLUTION EPIDURAL; INFILTRATION; INTRACAUDAL; PERINEURAL at 21:48

## 2019-01-01 RX ADMIN — Medication 10 ML: at 21:42

## 2019-01-01 RX ADMIN — MUPIROCIN: 20 OINTMENT TOPICAL at 08:08

## 2019-01-01 RX ADMIN — METOPROLOL TARTRATE 5 MG: 5 INJECTION, SOLUTION INTRAVENOUS at 18:55

## 2019-01-01 RX ADMIN — PROPOFOL 150 MG: 10 INJECTION, EMULSION INTRAVENOUS at 11:36

## 2019-01-01 RX ADMIN — ALBUMIN (HUMAN) 25 G: 0.25 INJECTION, SOLUTION INTRAVENOUS at 05:48

## 2019-01-01 RX ADMIN — LORAZEPAM 4 MG: 2 INJECTION INTRAMUSCULAR; INTRAVENOUS at 04:47

## 2019-01-01 RX ADMIN — METOPROLOL TARTRATE 5 MG: 5 INJECTION, SOLUTION INTRAVENOUS at 18:00

## 2019-01-01 RX ADMIN — METRONIDAZOLE 500 MG: 500 INJECTION, SOLUTION INTRAVENOUS at 08:43

## 2019-01-01 RX ADMIN — CALCIUM GLUCONATE 2 G: 98 INJECTION, SOLUTION INTRAVENOUS at 08:49

## 2019-01-01 RX ADMIN — SODIUM CHLORIDE 40 MG: 9 INJECTION, SOLUTION INTRAMUSCULAR; INTRAVENOUS; SUBCUTANEOUS at 08:20

## 2019-01-01 RX ADMIN — CLONIDINE HYDROCHLORIDE 0.2 MG: 0.2 TABLET ORAL at 18:43

## 2019-01-01 RX ADMIN — DEXTROSE MONOHYDRATE AND SODIUM CHLORIDE 200 ML/HR: 5; .225 INJECTION, SOLUTION INTRAVENOUS at 00:31

## 2019-01-01 RX ADMIN — Medication 10 ML: at 14:06

## 2019-01-01 RX ADMIN — Medication 10 ML: at 05:03

## 2019-01-01 RX ADMIN — METRONIDAZOLE 500 MG: 500 INJECTION, SOLUTION INTRAVENOUS at 22:19

## 2019-01-01 RX ADMIN — MUPIROCIN: 20 OINTMENT TOPICAL at 17:19

## 2019-01-01 RX ADMIN — SODIUM CHLORIDE 1 G: 900 INJECTION, SOLUTION INTRAVENOUS at 18:30

## 2019-01-01 RX ADMIN — CEFTRIAXONE SODIUM 1 G: 1 INJECTION, POWDER, FOR SOLUTION INTRAMUSCULAR; INTRAVENOUS at 08:22

## 2019-01-01 RX ADMIN — DEXTROSE MONOHYDRATE AND SODIUM CHLORIDE 125 ML/HR: 5; .225 INJECTION, SOLUTION INTRAVENOUS at 17:27

## 2019-01-01 RX ADMIN — Medication 10 ML: at 22:20

## 2019-01-01 RX ADMIN — DEXTROSE MONOHYDRATE AND SODIUM CHLORIDE 100 ML/HR: 5; .225 INJECTION, SOLUTION INTRAVENOUS at 05:59

## 2019-01-01 RX ADMIN — ALBUMIN (HUMAN) 25 G: 0.25 INJECTION, SOLUTION INTRAVENOUS at 13:55

## 2019-01-01 RX ADMIN — SULFAMETHOXAZOLE AND TRIMETHOPRIM 1 TABLET: 800; 160 TABLET ORAL at 21:25

## 2019-01-01 RX ADMIN — RIFAXIMIN 550 MG: 550 TABLET ORAL at 08:20

## 2019-01-01 RX ADMIN — METOPROLOL TARTRATE 5 MG: 5 INJECTION, SOLUTION INTRAVENOUS at 17:17

## 2019-01-01 RX ADMIN — PHYTONADIONE 10 MG: 10 INJECTION, EMULSION INTRAMUSCULAR; INTRAVENOUS; SUBCUTANEOUS at 20:10

## 2019-01-01 RX ADMIN — SODIUM CHLORIDE 40 MG: 9 INJECTION, SOLUTION INTRAMUSCULAR; INTRAVENOUS; SUBCUTANEOUS at 21:14

## 2019-01-01 RX ADMIN — MUPIROCIN: 20 OINTMENT TOPICAL at 17:12

## 2019-01-01 RX ADMIN — Medication 10 ML: at 13:55

## 2019-01-01 RX ADMIN — METOPROLOL TARTRATE 5 MG: 5 INJECTION, SOLUTION INTRAVENOUS at 23:34

## 2019-01-01 RX ADMIN — LORAZEPAM 1 MG: 2 INJECTION INTRAMUSCULAR; INTRAVENOUS at 22:40

## 2019-01-01 RX ADMIN — FOLIC ACID: 5 INJECTION, SOLUTION INTRAMUSCULAR; INTRAVENOUS; SUBCUTANEOUS at 10:00

## 2019-01-01 RX ADMIN — LACTULOSE 30 G: 20 SOLUTION ORAL at 21:50

## 2019-01-01 RX ADMIN — SODIUM CHLORIDE 50 ML/HR: 900 INJECTION, SOLUTION INTRAVENOUS at 11:03

## 2019-01-01 RX ADMIN — OCTREOTIDE ACETATE 50 MCG/HR: 500 INJECTION, SOLUTION INTRAVENOUS; SUBCUTANEOUS at 23:18

## 2019-01-01 RX ADMIN — OXYCODONE HYDROCHLORIDE 5 MG: 5 TABLET ORAL at 19:22

## 2019-01-01 RX ADMIN — DEXTROSE MONOHYDRATE AND SODIUM CHLORIDE 200 ML/HR: 5; .225 INJECTION, SOLUTION INTRAVENOUS at 05:36

## 2019-01-01 RX ADMIN — PROPOFOL 75 MCG/KG/MIN: 10 INJECTION, EMULSION INTRAVENOUS at 11:52

## 2019-01-01 RX ADMIN — DOCUSATE SODIUM 100 MG: 100 CAPSULE, LIQUID FILLED ORAL at 19:18

## 2019-01-01 RX ADMIN — OCTREOTIDE ACETATE 50 MCG/HR: 100 INJECTION, SOLUTION INTRAVENOUS; SUBCUTANEOUS at 12:29

## 2019-01-01 RX ADMIN — BUMETANIDE 1 MG: 1 TABLET ORAL at 12:03

## 2019-01-01 RX ADMIN — Medication 10 ML: at 21:03

## 2019-01-01 RX ADMIN — OCTREOTIDE ACETATE 50 MCG/HR: 500 INJECTION, SOLUTION INTRAVENOUS; SUBCUTANEOUS at 18:02

## 2019-01-01 RX ADMIN — SODIUM CHLORIDE 125 ML/HR: 900 INJECTION, SOLUTION INTRAVENOUS at 03:31

## 2019-01-01 RX ADMIN — PROPOFOL 50 MCG/KG/MIN: 10 INJECTION, EMULSION INTRAVENOUS at 11:42

## 2019-01-01 RX ADMIN — METRONIDAZOLE 500 MG: 500 INJECTION, SOLUTION INTRAVENOUS at 08:44

## 2019-01-01 RX ADMIN — MUPIROCIN: 20 OINTMENT TOPICAL at 14:05

## 2019-01-01 RX ADMIN — DOCUSATE SODIUM 100 MG: 100 CAPSULE, LIQUID FILLED ORAL at 09:14

## 2019-01-01 RX ADMIN — CLONIDINE HYDROCHLORIDE 0.2 MG: 0.2 TABLET ORAL at 19:17

## 2019-01-01 RX ADMIN — METRONIDAZOLE 500 MG: 500 INJECTION, SOLUTION INTRAVENOUS at 20:20

## 2019-01-01 RX ADMIN — Medication 10 ML: at 21:25

## 2019-01-01 RX ADMIN — DEXTROSE MONOHYDRATE AND SODIUM CHLORIDE 100 ML/HR: 5; .225 INJECTION, SOLUTION INTRAVENOUS at 17:00

## 2019-01-01 RX ADMIN — LIDOCAINE HYDROCHLORIDE 100 MG: 20 INJECTION, SOLUTION EPIDURAL; INFILTRATION; INTRACAUDAL; PERINEURAL at 11:35

## 2019-01-01 RX ADMIN — OCTREOTIDE ACETATE 50 MCG/HR: 500 INJECTION, SOLUTION INTRAVENOUS; SUBCUTANEOUS at 02:13

## 2019-01-01 RX ADMIN — POTASSIUM CHLORIDE 20 MEQ: 400 INJECTION, SOLUTION INTRAVENOUS at 10:09

## 2019-01-01 RX ADMIN — LACTULOSE 200 G: 10 SOLUTION ORAL at 17:20

## 2019-01-01 RX ADMIN — Medication 10 ML: at 05:11

## 2019-01-01 RX ADMIN — METOPROLOL TARTRATE 5 MG: 5 INJECTION, SOLUTION INTRAVENOUS at 06:00

## 2019-01-01 RX ADMIN — PHYTONADIONE 10 MG: 10 INJECTION, EMULSION INTRAMUSCULAR; INTRAVENOUS; SUBCUTANEOUS at 20:56

## 2019-01-01 RX ADMIN — DOCUSATE SODIUM 100 MG: 100 CAPSULE, LIQUID FILLED ORAL at 12:02

## 2019-01-01 RX ADMIN — METOPROLOL TARTRATE 5 MG: 5 INJECTION, SOLUTION INTRAVENOUS at 13:55

## 2019-01-01 RX ADMIN — Medication 4 MCG/MIN: at 16:59

## 2019-01-01 RX ADMIN — OCTREOTIDE ACETATE 50 MCG/HR: 500 INJECTION, SOLUTION INTRAVENOUS; SUBCUTANEOUS at 09:14

## 2019-01-01 RX ADMIN — OCTREOTIDE ACETATE 50 MCG/HR: 500 INJECTION, SOLUTION INTRAVENOUS; SUBCUTANEOUS at 06:34

## 2019-01-01 RX ADMIN — SUCCINYLCHOLINE CHLORIDE 180 MG: 20 INJECTION INTRAMUSCULAR; INTRAVENOUS at 11:36

## 2019-01-01 RX ADMIN — CHLORHEXIDINE GLUCONATE 15 ML: 1.2 RINSE ORAL at 20:25

## 2019-01-01 RX ADMIN — LACTULOSE 200 G: 10 SOLUTION ORAL at 05:13

## 2019-01-01 RX ADMIN — NOREPINEPHRINE BITARTRATE 200 MCG/MIN: 1 INJECTION INTRAVENOUS at 21:39

## 2019-01-01 RX ADMIN — PIPERACILLIN SODIUM,TAZOBACTAM SODIUM 4.5 G: 4; .5 INJECTION, POWDER, FOR SOLUTION INTRAVENOUS at 14:06

## 2019-01-01 RX ADMIN — Medication 10 ML: at 16:34

## 2019-01-01 RX ADMIN — FOLIC ACID: 5 INJECTION, SOLUTION INTRAMUSCULAR; INTRAVENOUS; SUBCUTANEOUS at 10:50

## 2019-01-01 RX ADMIN — LACTULOSE 200 G: 10 SOLUTION ORAL at 05:00

## 2019-01-01 RX ADMIN — MUPIROCIN: 20 OINTMENT TOPICAL at 08:02

## 2019-01-01 RX ADMIN — PIPERACILLIN SODIUM,TAZOBACTAM SODIUM 4.5 G: 4; .5 INJECTION, POWDER, FOR SOLUTION INTRAVENOUS at 05:00

## 2019-01-01 RX ADMIN — FOLIC ACID: 5 INJECTION, SOLUTION INTRAMUSCULAR; INTRAVENOUS; SUBCUTANEOUS at 08:29

## 2019-01-01 RX ADMIN — SODIUM CHLORIDE 40 MG: 9 INJECTION, SOLUTION INTRAMUSCULAR; INTRAVENOUS; SUBCUTANEOUS at 08:00

## 2019-01-01 RX ADMIN — CHLORHEXIDINE GLUCONATE 15 ML: 1.2 RINSE ORAL at 08:49

## 2019-01-01 RX ADMIN — Medication 10 ML: at 13:04

## 2019-01-01 RX ADMIN — GABAPENTIN 100 MG: 100 CAPSULE ORAL at 17:49

## 2019-01-01 RX ADMIN — METOPROLOL TARTRATE 5 MG: 5 INJECTION, SOLUTION INTRAVENOUS at 18:10

## 2019-01-01 RX ADMIN — DEXTROSE MONOHYDRATE AND SODIUM CHLORIDE 200 ML/HR: 5; .225 INJECTION, SOLUTION INTRAVENOUS at 14:47

## 2019-01-01 RX ADMIN — MUPIROCIN: 20 OINTMENT TOPICAL at 08:34

## 2019-01-01 RX ADMIN — Medication 10 ML: at 13:24

## 2019-01-01 RX ADMIN — DEXTROSE MONOHYDRATE AND SODIUM CHLORIDE 100 ML/HR: 5; .225 INJECTION, SOLUTION INTRAVENOUS at 22:00

## 2019-01-01 RX ADMIN — ALBUMIN (HUMAN) 25 G: 0.25 INJECTION, SOLUTION INTRAVENOUS at 05:00

## 2019-01-01 RX ADMIN — Medication 10 ML: at 13:26

## 2019-01-01 RX ADMIN — SODIUM CHLORIDE 40 MG: 9 INJECTION, SOLUTION INTRAMUSCULAR; INTRAVENOUS; SUBCUTANEOUS at 21:40

## 2019-01-01 RX ADMIN — RIFAXIMIN 550 MG: 550 TABLET ORAL at 08:47

## 2019-01-01 RX ADMIN — SODIUM CHLORIDE 40 MG: 9 INJECTION, SOLUTION INTRAMUSCULAR; INTRAVENOUS; SUBCUTANEOUS at 08:35

## 2019-01-01 RX ADMIN — SODIUM CHLORIDE: 900 INJECTION, SOLUTION INTRAVENOUS at 08:40

## 2019-01-01 RX ADMIN — RIFAXIMIN 550 MG: 550 TABLET ORAL at 18:53

## 2019-01-01 RX ADMIN — SODIUM CHLORIDE: 9 INJECTION, SOLUTION INTRAVENOUS at 11:24

## 2019-01-01 RX ADMIN — METOPROLOL TARTRATE 5 MG: 5 INJECTION, SOLUTION INTRAVENOUS at 23:06

## 2019-01-01 RX ADMIN — RIFAXIMIN 550 MG: 550 TABLET ORAL at 08:11

## 2019-01-01 RX ADMIN — MUPIROCIN: 20 OINTMENT TOPICAL at 17:59

## 2019-01-01 RX ADMIN — HYDRALAZINE HYDROCHLORIDE 10 MG: 20 INJECTION INTRAMUSCULAR; INTRAVENOUS at 20:39

## 2019-01-01 RX ADMIN — LORAZEPAM 2 MG: 2 INJECTION INTRAMUSCULAR; INTRAVENOUS at 21:23

## 2019-01-01 RX ADMIN — LEVOFLOXACIN 750 MG: 5 INJECTION, SOLUTION INTRAVENOUS at 07:53

## 2019-01-01 RX ADMIN — SODIUM CHLORIDE 125 ML/HR: 900 INJECTION, SOLUTION INTRAVENOUS at 07:07

## 2019-01-01 RX ADMIN — CLONIDINE HYDROCHLORIDE 0.2 MG: 0.2 TABLET ORAL at 09:14

## 2019-01-01 RX ADMIN — METHADONE HYDROCHLORIDE 15 MG: 5 TABLET ORAL at 09:14

## 2019-01-01 RX ADMIN — METOPROLOL TARTRATE 5 MG: 5 INJECTION, SOLUTION INTRAVENOUS at 05:10

## 2019-01-01 RX ADMIN — SODIUM CHLORIDE 40 MG: 9 INJECTION, SOLUTION INTRAMUSCULAR; INTRAVENOUS; SUBCUTANEOUS at 08:23

## 2019-01-01 RX ADMIN — METOPROLOL TARTRATE 5 MG: 5 INJECTION, SOLUTION INTRAVENOUS at 23:40

## 2019-01-01 RX ADMIN — PROPOFOL 40 MCG/KG/MIN: 10 INJECTION, EMULSION INTRAVENOUS at 18:21

## 2019-01-01 RX ADMIN — GABAPENTIN 100 MG: 100 CAPSULE ORAL at 19:18

## 2019-01-01 RX ADMIN — PROPOFOL 50 MCG/KG/MIN: 10 INJECTION, EMULSION INTRAVENOUS at 04:48

## 2019-01-01 RX ADMIN — PROPOFOL 30 MCG/KG/MIN: 10 INJECTION, EMULSION INTRAVENOUS at 03:29

## 2019-01-01 RX ADMIN — ONDANSETRON 4 MG: 2 INJECTION INTRAMUSCULAR; INTRAVENOUS at 08:58

## 2019-01-01 RX ADMIN — SODIUM CHLORIDE 1000 ML: 900 INJECTION, SOLUTION INTRAVENOUS at 06:00

## 2019-01-01 RX ADMIN — LACTULOSE 30 G: 20 SOLUTION ORAL at 09:35

## 2019-01-01 RX ADMIN — Medication 10 ML: at 16:40

## 2019-01-01 RX ADMIN — PIPERACILLIN SODIUM,TAZOBACTAM SODIUM 3.38 G: 3; .375 INJECTION, POWDER, FOR SOLUTION INTRAVENOUS at 13:57

## 2019-01-01 RX ADMIN — METOPROLOL TARTRATE 5 MG: 5 INJECTION, SOLUTION INTRAVENOUS at 17:23

## 2019-01-01 RX ADMIN — CHLORHEXIDINE GLUCONATE 15 ML: 1.2 RINSE ORAL at 21:04

## 2019-01-01 RX ADMIN — CEFTRIAXONE SODIUM 1 G: 1 INJECTION, POWDER, FOR SOLUTION INTRAMUSCULAR; INTRAVENOUS at 06:44

## 2019-01-01 RX ADMIN — LEVOFLOXACIN 750 MG: 5 INJECTION, SOLUTION INTRAVENOUS at 08:38

## 2019-01-01 RX ADMIN — POTASSIUM CHLORIDE 40 MEQ: 750 TABLET, EXTENDED RELEASE ORAL at 05:30

## 2019-01-01 RX ADMIN — METOPROLOL TARTRATE 5 MG: 5 INJECTION, SOLUTION INTRAVENOUS at 11:09

## 2019-01-01 RX ADMIN — DEXTROSE MONOHYDRATE AND SODIUM CHLORIDE 150 ML/HR: 5; .225 INJECTION, SOLUTION INTRAVENOUS at 21:20

## 2019-01-01 RX ADMIN — DEXTROSE MONOHYDRATE AND SODIUM CHLORIDE 100 ML/HR: 5; .225 INJECTION, SOLUTION INTRAVENOUS at 20:21

## 2019-01-01 RX ADMIN — MORPHINE SULFATE 10 MG: 10 INJECTION INTRAVENOUS at 22:40

## 2019-01-01 RX ADMIN — METOPROLOL TARTRATE 5 MG: 5 INJECTION, SOLUTION INTRAVENOUS at 11:46

## 2019-01-01 RX ADMIN — ALBUMIN (HUMAN) 25 G: 0.25 INJECTION, SOLUTION INTRAVENOUS at 23:14

## 2019-01-01 RX ADMIN — SODIUM CHLORIDE 125 ML/HR: 900 INJECTION, SOLUTION INTRAVENOUS at 16:00

## 2019-01-01 RX ADMIN — LORAZEPAM 4 MG: 2 INJECTION INTRAMUSCULAR; INTRAVENOUS at 07:05

## 2019-01-01 RX ADMIN — LACTULOSE 30 G: 20 SOLUTION ORAL at 15:36

## 2019-01-01 RX ADMIN — BUMETANIDE 1 MG: 1 TABLET ORAL at 09:14

## 2019-01-01 RX ADMIN — LORAZEPAM 2 MG: 2 INJECTION INTRAMUSCULAR; INTRAVENOUS at 00:21

## 2019-01-01 RX ADMIN — LORAZEPAM 2 MG: 2 INJECTION INTRAMUSCULAR; INTRAVENOUS at 00:20

## 2019-01-01 RX ADMIN — Medication 10 ML: at 21:41

## 2019-01-01 RX ADMIN — MUPIROCIN: 20 OINTMENT TOPICAL at 18:05

## 2019-01-01 RX ADMIN — PIPERACILLIN SODIUM,TAZOBACTAM SODIUM 4.5 G: 4; .5 INJECTION, POWDER, FOR SOLUTION INTRAVENOUS at 11:44

## 2019-01-01 RX ADMIN — LEVOFLOXACIN 750 MG: 5 INJECTION, SOLUTION INTRAVENOUS at 07:55

## 2019-01-01 RX ADMIN — METOPROLOL TARTRATE 5 MG: 5 INJECTION, SOLUTION INTRAVENOUS at 11:24

## 2019-01-01 RX ADMIN — Medication: at 22:04

## 2019-01-01 RX ADMIN — METRONIDAZOLE 500 MG: 500 INJECTION, SOLUTION INTRAVENOUS at 21:08

## 2019-01-01 RX ADMIN — SULFAMETHOXAZOLE AND TRIMETHOPRIM 1 TABLET: 800; 160 TABLET ORAL at 12:02

## 2019-01-01 RX ADMIN — LACTULOSE 30 G: 20 SOLUTION ORAL at 08:07

## 2019-01-01 RX ADMIN — Medication 10 ML: at 04:55

## 2019-01-01 RX ADMIN — METRONIDAZOLE 500 MG: 500 INJECTION, SOLUTION INTRAVENOUS at 08:08

## 2019-01-01 RX ADMIN — SODIUM CHLORIDE 40 MG: 9 INJECTION, SOLUTION INTRAMUSCULAR; INTRAVENOUS; SUBCUTANEOUS at 20:36

## 2019-01-01 RX ADMIN — METRONIDAZOLE 500 MG: 500 INJECTION, SOLUTION INTRAVENOUS at 15:02

## 2019-01-01 RX ADMIN — GABAPENTIN 100 MG: 100 CAPSULE ORAL at 12:02

## 2019-01-01 RX ADMIN — OXYCODONE AND ACETAMINOPHEN 1 TABLET: 5; 325 TABLET ORAL at 02:07

## 2019-01-01 RX ADMIN — DEXTROSE MONOHYDRATE AND SODIUM CHLORIDE 200 ML/HR: 5; .225 INJECTION, SOLUTION INTRAVENOUS at 19:44

## 2019-01-01 RX ADMIN — LORAZEPAM 4 MG: 2 INJECTION INTRAMUSCULAR; INTRAVENOUS at 23:35

## 2019-01-01 RX ADMIN — DEXTROSE MONOHYDRATE AND SODIUM CHLORIDE 125 ML/HR: 5; .225 INJECTION, SOLUTION INTRAVENOUS at 01:45

## 2019-01-01 RX ADMIN — PROPOFOL 50 MCG/KG/MIN: 10 INJECTION, EMULSION INTRAVENOUS at 15:01

## 2019-01-01 RX ADMIN — Medication 10 ML: at 13:47

## 2019-01-01 RX ADMIN — LACTULOSE 200 G: 10 SOLUTION ORAL at 11:16

## 2019-01-01 RX ADMIN — ALBUMIN (HUMAN) 25 G: 0.25 INJECTION, SOLUTION INTRAVENOUS at 05:03

## 2019-01-01 RX ADMIN — Medication 10 ML: at 21:10

## 2019-01-01 RX ADMIN — DEXTROSE MONOHYDRATE AND SODIUM CHLORIDE 100 ML/HR: 5; .225 INJECTION, SOLUTION INTRAVENOUS at 04:55

## 2019-01-01 RX ADMIN — METOPROLOL TARTRATE 5 MG: 5 INJECTION, SOLUTION INTRAVENOUS at 05:00

## 2019-01-01 RX ADMIN — Medication 50 MCG/HR: at 17:57

## 2019-01-01 RX ADMIN — POTASSIUM CHLORIDE 40 MEQ: 750 TABLET, EXTENDED RELEASE ORAL at 12:02

## 2019-01-01 RX ADMIN — LACTULOSE 30 G: 20 SOLUTION ORAL at 15:46

## 2019-01-01 RX ADMIN — OXYCODONE AND ACETAMINOPHEN 1 TABLET: 5; 325 TABLET ORAL at 07:09

## 2019-01-01 RX ADMIN — CHLORHEXIDINE GLUCONATE 15 ML: 1.2 RINSE ORAL at 23:39

## 2019-01-01 RX ADMIN — OXYCODONE HYDROCHLORIDE 5 MG: 5 TABLET ORAL at 01:07

## 2019-01-01 RX ADMIN — SODIUM CHLORIDE 125 ML/HR: 900 INJECTION, SOLUTION INTRAVENOUS at 21:17

## 2019-01-01 RX ADMIN — POLYETHYLENE GLYCOL 3350 17 G: 17 POWDER, FOR SOLUTION ORAL at 13:25

## 2019-01-01 RX ADMIN — METOPROLOL TARTRATE 5 MG: 5 INJECTION, SOLUTION INTRAVENOUS at 13:10

## 2019-01-01 RX ADMIN — LIDOCAINE HYDROCHLORIDE 1 G: 10 INJECTION, SOLUTION EPIDURAL; INFILTRATION; INTRACAUDAL; PERINEURAL at 18:07

## 2019-01-01 RX ADMIN — CLONIDINE HYDROCHLORIDE 0.2 MG: 0.2 TABLET ORAL at 12:03

## 2019-01-01 RX ADMIN — DEXTROSE MONOHYDRATE AND SODIUM CHLORIDE 200 ML/HR: 5; .225 INJECTION, SOLUTION INTRAVENOUS at 09:53

## 2019-01-01 RX ADMIN — METRONIDAZOLE 500 MG: 500 INJECTION, SOLUTION INTRAVENOUS at 21:40

## 2019-01-01 RX ADMIN — OCTREOTIDE ACETATE 50 MCG/HR: 500 INJECTION, SOLUTION INTRAVENOUS; SUBCUTANEOUS at 11:42

## 2019-01-01 RX ADMIN — CHLORHEXIDINE GLUCONATE 15 ML: 1.2 RINSE ORAL at 08:08

## 2019-01-01 RX ADMIN — LACTULOSE 30 G: 20 SOLUTION ORAL at 18:00

## 2019-01-01 RX ADMIN — METOPROLOL TARTRATE 5 MG: 5 INJECTION, SOLUTION INTRAVENOUS at 00:05

## 2019-01-01 RX ADMIN — ALBUMIN (HUMAN) 25 G: 0.25 INJECTION, SOLUTION INTRAVENOUS at 17:59

## 2019-01-01 RX ADMIN — CHLORHEXIDINE GLUCONATE 15 ML: 1.2 RINSE ORAL at 20:41

## 2019-01-01 RX ADMIN — PIPERACILLIN SODIUM,TAZOBACTAM SODIUM 4.5 G: 4; .5 INJECTION, POWDER, FOR SOLUTION INTRAVENOUS at 19:56

## 2019-01-01 RX ADMIN — RIFAXIMIN 550 MG: 550 TABLET ORAL at 18:07

## 2019-01-01 RX ADMIN — Medication 10 ML: at 06:05

## 2019-01-01 RX ADMIN — PROPOFOL 50 MCG/KG/MIN: 10 INJECTION, EMULSION INTRAVENOUS at 01:20

## 2019-01-01 RX ADMIN — Medication 10 ML: at 14:26

## 2019-01-01 RX ADMIN — Medication 10 ML: at 14:25

## 2019-01-01 RX ADMIN — CHLORHEXIDINE GLUCONATE 15 ML: 1.2 RINSE ORAL at 22:19

## 2019-01-01 RX ADMIN — DEXTROSE MONOHYDRATE AND SODIUM CHLORIDE 100 ML/HR: 5; .225 INJECTION, SOLUTION INTRAVENOUS at 11:11

## 2019-01-01 RX ADMIN — LEVOFLOXACIN 750 MG: 5 INJECTION, SOLUTION INTRAVENOUS at 08:21

## 2019-01-01 RX ADMIN — Medication 10 ML: at 05:49

## 2019-01-01 RX ADMIN — OCTREOTIDE ACETATE 50 MCG/HR: 500 INJECTION, SOLUTION INTRAVENOUS; SUBCUTANEOUS at 04:52

## 2019-01-01 RX ADMIN — GABAPENTIN 100 MG: 100 CAPSULE ORAL at 09:14

## 2019-01-01 RX ADMIN — PANTOPRAZOLE SODIUM 40 MG: 40 INJECTION, POWDER, FOR SOLUTION INTRAVENOUS at 15:12

## 2019-01-01 RX ADMIN — OCTREOTIDE ACETATE 50 MCG/HR: 500 INJECTION, SOLUTION INTRAVENOUS; SUBCUTANEOUS at 01:16

## 2019-01-01 RX ADMIN — OCTREOTIDE ACETATE 50 MCG/HR: 500 INJECTION, SOLUTION INTRAVENOUS; SUBCUTANEOUS at 15:52

## 2019-01-01 RX ADMIN — Medication 10 ML: at 05:01

## 2019-01-01 RX ADMIN — GABAPENTIN 100 MG: 100 CAPSULE ORAL at 18:28

## 2019-01-01 RX ADMIN — METRONIDAZOLE 500 MG: 500 INJECTION, SOLUTION INTRAVENOUS at 09:36

## 2019-01-01 RX ADMIN — LACTULOSE 200 G: 10 SOLUTION ORAL at 17:12

## 2019-01-01 RX ADMIN — Medication 10 ML: at 18:39

## 2019-01-01 RX ADMIN — ALBUMIN (HUMAN) 25 G: 0.25 INJECTION, SOLUTION INTRAVENOUS at 18:12

## 2019-01-01 RX ADMIN — CHLORHEXIDINE GLUCONATE 15 ML: 1.2 RINSE ORAL at 21:40

## 2019-01-01 RX ADMIN — SODIUM CHLORIDE 40 MG: 9 INJECTION, SOLUTION INTRAMUSCULAR; INTRAVENOUS; SUBCUTANEOUS at 20:43

## 2019-01-01 RX ADMIN — OXYCODONE HYDROCHLORIDE 5 MG: 5 TABLET ORAL at 06:42

## 2019-01-01 RX ADMIN — CHLORHEXIDINE GLUCONATE 15 ML: 1.2 RINSE ORAL at 08:02

## 2019-01-01 RX ADMIN — LACTULOSE 200 G: 10 SOLUTION ORAL at 23:34

## 2019-01-01 RX ADMIN — SODIUM CHLORIDE 40 MG: 9 INJECTION, SOLUTION INTRAMUSCULAR; INTRAVENOUS; SUBCUTANEOUS at 08:08

## 2019-01-01 RX ADMIN — LACTULOSE 30 G: 20 SOLUTION ORAL at 08:00

## 2019-01-01 RX ADMIN — LACTULOSE 30 G: 20 SOLUTION ORAL at 22:00

## 2019-01-01 RX ADMIN — ALBUMIN (HUMAN) 25 G: 0.25 INJECTION, SOLUTION INTRAVENOUS at 11:24

## 2019-01-01 RX ADMIN — ALBUMIN (HUMAN) 25 G: 0.25 INJECTION, SOLUTION INTRAVENOUS at 23:26

## 2019-01-01 RX ADMIN — SODIUM CHLORIDE 40 MG: 9 INJECTION, SOLUTION INTRAMUSCULAR; INTRAVENOUS; SUBCUTANEOUS at 20:39

## 2019-01-01 RX ADMIN — GLYCOPYRROLATE 0.2 MG: 0.2 INJECTION, SOLUTION INTRAMUSCULAR; INTRAVENOUS at 22:40

## 2019-01-01 RX ADMIN — SODIUM CHLORIDE 25 ML/HR: 900 INJECTION, SOLUTION INTRAVENOUS at 21:03

## 2019-01-01 RX ADMIN — OCTREOTIDE ACETATE 50 MCG/HR: 500 INJECTION, SOLUTION INTRAVENOUS; SUBCUTANEOUS at 15:59

## 2019-01-01 RX ADMIN — METOPROLOL TARTRATE 5 MG: 5 INJECTION, SOLUTION INTRAVENOUS at 17:56

## 2019-01-01 RX ADMIN — SODIUM CHLORIDE 40 MG: 9 INJECTION, SOLUTION INTRAMUSCULAR; INTRAVENOUS; SUBCUTANEOUS at 08:41

## 2019-01-01 RX ADMIN — METOPROLOL TARTRATE 5 MG: 5 INJECTION, SOLUTION INTRAVENOUS at 23:16

## 2019-01-01 RX ADMIN — CHLORHEXIDINE GLUCONATE 15 ML: 1.2 RINSE ORAL at 08:12

## 2019-01-01 RX ADMIN — ALBUMIN (HUMAN) 25 G: 0.25 INJECTION, SOLUTION INTRAVENOUS at 11:11

## 2019-01-01 RX ADMIN — SODIUM CHLORIDE 125 ML/HR: 900 INJECTION, SOLUTION INTRAVENOUS at 23:18

## 2019-01-01 RX ADMIN — LACTULOSE 30 G: 20 SOLUTION ORAL at 16:40

## 2019-01-01 RX ADMIN — LACTULOSE 30 G: 20 SOLUTION ORAL at 22:20

## 2019-01-01 RX ADMIN — Medication 10 ML: at 21:51

## 2019-01-01 RX ADMIN — ALBUMIN (HUMAN) 25 G: 0.25 INJECTION, SOLUTION INTRAVENOUS at 00:04

## 2019-01-01 RX ADMIN — PROPOFOL 50 MCG/KG/MIN: 10 INJECTION, EMULSION INTRAVENOUS at 22:04

## 2019-01-01 RX ADMIN — PROPOFOL 40 MCG/KG/MIN: 10 INJECTION, EMULSION INTRAVENOUS at 23:40

## 2019-01-01 RX ADMIN — SODIUM CHLORIDE 40 MG: 9 INJECTION, SOLUTION INTRAMUSCULAR; INTRAVENOUS; SUBCUTANEOUS at 21:04

## 2019-01-01 RX ADMIN — Medication 150 MCG/HR: at 04:47

## 2019-01-01 RX ADMIN — CEFTRIAXONE 1 G: 1 INJECTION, POWDER, FOR SOLUTION INTRAMUSCULAR; INTRAVENOUS at 08:57

## 2019-01-01 RX ADMIN — SODIUM CHLORIDE 1000 ML: 900 INJECTION, SOLUTION INTRAVENOUS at 19:05

## 2019-01-01 RX ADMIN — CLONIDINE HYDROCHLORIDE 0.2 MG: 0.2 TABLET ORAL at 17:49

## 2019-01-01 RX ADMIN — LORAZEPAM 2 MG: 2 INJECTION INTRAMUSCULAR; INTRAVENOUS at 20:50

## 2019-01-01 RX ADMIN — PIPERACILLIN SODIUM,TAZOBACTAM SODIUM 4.5 G: 4; .5 INJECTION, POWDER, FOR SOLUTION INTRAVENOUS at 04:10

## 2019-01-01 RX ADMIN — METRONIDAZOLE 500 MG: 500 INJECTION, SOLUTION INTRAVENOUS at 09:14

## 2019-01-01 RX ADMIN — SODIUM CHLORIDE 40 MG: 9 INJECTION, SOLUTION INTRAMUSCULAR; INTRAVENOUS; SUBCUTANEOUS at 09:35

## 2019-01-01 RX ADMIN — METOPROLOL TARTRATE 5 MG: 5 INJECTION, SOLUTION INTRAVENOUS at 17:59

## 2019-01-01 RX ADMIN — METOPROLOL TARTRATE 5 MG: 5 INJECTION, SOLUTION INTRAVENOUS at 23:26

## 2019-01-01 RX ADMIN — SODIUM CHLORIDE 40 MG: 9 INJECTION, SOLUTION INTRAMUSCULAR; INTRAVENOUS; SUBCUTANEOUS at 08:48

## 2019-01-01 RX ADMIN — POTASSIUM CHLORIDE 40 MEQ: 750 TABLET, EXTENDED RELEASE ORAL at 17:04

## 2019-01-01 RX ADMIN — METRONIDAZOLE 500 MG: 500 INJECTION, SOLUTION INTRAVENOUS at 22:04

## 2019-01-01 RX ADMIN — ALBUMIN (HUMAN) 25 G: 0.25 INJECTION, SOLUTION INTRAVENOUS at 19:03

## 2019-01-01 RX ADMIN — PIPERACILLIN SODIUM,TAZOBACTAM SODIUM 4.5 G: 4; .5 INJECTION, POWDER, FOR SOLUTION INTRAVENOUS at 19:47

## 2019-01-01 RX ADMIN — METOPROLOL TARTRATE 5 MG: 5 INJECTION, SOLUTION INTRAVENOUS at 11:29

## 2019-01-01 RX ADMIN — OCTREOTIDE ACETATE 50 MCG/HR: 100 INJECTION, SOLUTION INTRAVENOUS; SUBCUTANEOUS at 23:34

## 2019-01-01 RX ADMIN — METOPROLOL TARTRATE 5 MG: 5 INJECTION, SOLUTION INTRAVENOUS at 05:48

## 2019-01-01 RX ADMIN — PHYTONADIONE 10 MG: 10 INJECTION, EMULSION INTRAMUSCULAR; INTRAVENOUS; SUBCUTANEOUS at 21:13

## 2019-01-01 RX ADMIN — DOCUSATE SODIUM 100 MG: 100 CAPSULE, LIQUID FILLED ORAL at 18:29

## 2019-01-01 RX ADMIN — PROPOFOL 50 MCG/KG/MIN: 10 INJECTION, EMULSION INTRAVENOUS at 18:23

## 2019-01-01 RX ADMIN — Medication 10 ML: at 06:10

## 2019-01-01 RX ADMIN — OXYCODONE AND ACETAMINOPHEN 1 TABLET: 5; 325 TABLET ORAL at 17:49

## 2019-01-01 RX ADMIN — PROPOFOL 50 MCG/KG/MIN: 10 INJECTION, EMULSION INTRAVENOUS at 12:30

## 2019-01-01 RX ADMIN — SODIUM CHLORIDE 40 MG: 9 INJECTION, SOLUTION INTRAMUSCULAR; INTRAVENOUS; SUBCUTANEOUS at 22:19

## 2019-01-01 RX ADMIN — Medication 10 ML: at 21:14

## 2019-01-01 RX ADMIN — PROPOFOL 50 MCG/KG/MIN: 10 INJECTION, EMULSION INTRAVENOUS at 08:25

## 2019-01-01 RX ADMIN — Medication 10 ML: at 06:11

## 2019-01-01 RX ADMIN — MUPIROCIN: 20 OINTMENT TOPICAL at 08:50

## 2019-01-01 RX ADMIN — OCTREOTIDE ACETATE 50 MCG/HR: 500 INJECTION, SOLUTION INTRAVENOUS; SUBCUTANEOUS at 20:21

## 2019-01-01 RX ADMIN — Medication 125 MCG/HR: at 15:22

## 2019-01-01 RX ADMIN — LORAZEPAM 2 MG: 2 INJECTION INTRAMUSCULAR; INTRAVENOUS at 03:32

## 2019-01-01 RX ADMIN — LACTULOSE 200 G: 10 SOLUTION ORAL at 23:07

## 2019-01-01 RX ADMIN — ONDANSETRON 4 MG: 2 INJECTION INTRAMUSCULAR; INTRAVENOUS at 05:59

## 2019-01-01 RX ADMIN — METHADONE HYDROCHLORIDE 30 MG: 10 TABLET ORAL at 18:28

## 2019-01-01 RX ADMIN — Medication 10 ML: at 20:41

## 2019-01-01 RX ADMIN — Medication 10 ML: at 05:30

## 2019-01-01 RX ADMIN — OXYCODONE HYDROCHLORIDE 5 MG: 5 TABLET ORAL at 17:01

## 2019-01-01 RX ADMIN — METOPROLOL TARTRATE 5 MG: 5 INJECTION, SOLUTION INTRAVENOUS at 23:38

## 2019-01-01 RX ADMIN — METOPROLOL TARTRATE 5 MG: 5 INJECTION, SOLUTION INTRAVENOUS at 11:47

## 2019-01-01 RX ADMIN — DEXTROSE MONOHYDRATE AND SODIUM CHLORIDE 150 ML/HR: 5; .225 INJECTION, SOLUTION INTRAVENOUS at 15:44

## 2019-01-01 RX ADMIN — SODIUM CHLORIDE 40 MG: 9 INJECTION, SOLUTION INTRAMUSCULAR; INTRAVENOUS; SUBCUTANEOUS at 20:41

## 2019-01-01 NOTE — ED NOTES
Unable to obtain PIV access in patient. Two attempts with ultrasound unsuccessful. Pt refusing EJ or anything in his neck. Hospitalist paged.
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
## 2019-01-01 NOTE — ED TRIAGE NOTES
Pt presents with right leg/thigh pain and tenderness following a \"bug bite\" Pt states he felt like something bit him yesterday and it has been swelling and bothering him since

## 2019-01-01 NOTE — ROUTINE PROCESS
TRANSFER - OUT REPORT: 
 
Verbal report given to Ashley Mcneal RN(name) on [facility]  being transferred to (unit) for routine progression of care Report consisted of patients Situation, Background, Assessment and  
Recommendations(SBAR). Information from the following report(s) SBAR, Kardex, ED Summary and MAR was reviewed with the receiving nurse. Lines:    
 
Opportunity for questions and clarification was provided. Patient transported with: 
 [employer]

## 2019-01-02 NOTE — PROGRESS NOTES
Patient asked for medications to be held for now as he has a stomach ache and needs to use the restroom to have a BM.

## 2019-01-02 NOTE — PROGRESS NOTES
Problem: Falls - Risk of 
Goal: *Absence of Falls Document [place] Falls Rank Fall Risk and appropriate interventions in the flowsheet. Outcome: Progressing Towards Goal 
Fall Risk Interventions: 
Mobility Interventions: Communicate number of staff needed for ambulation/transfer Medication Interventions: Evaluate medications/consider consulting pharmacy History of Falls Interventions: Evaluate medications/consider consulting pharmacy
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
## 2019-01-02 NOTE — PROGRESS NOTES
Physical Therapy Screening: 
Services are not indicated at this time. [facility] based on results obtained during the nursing admission assessment. The patients chart was reviewed and the patient is not appropriate for a skilled therapy evaluation at this time. Please consult physical therapy if any therapy needs arise. Thank you.  
 
Aretha Vora, PT

## 2019-01-02 NOTE — PROGRESS NOTES
NUTRITION Pt seen for:    
 
[]           Supplements  []             PO intake check  
[]           Food Allergies  []             Food Preferences/tolerances   
[]           Rescreen   []             Education []           Diet order clarification [x]             Other: MST (wt loss) RECOMMENDATIONS:  
Continue diet as ordered Weekly weight (standing scale preferred) SUBJECTIVE/OBJECTIVE:  
Chart reviewed, discussed with RN. Pt admitted with cellulitis. Pt states weight loss prior to admission was intentional.  He has been eating small portions and exercising regularly. Intake and appetite are WNL. He feels hungry this morning and eager for lunch. No risk for malnutrition noted at this time. Will rescreen as indicated. Diet:  Regular Intake: [x]           Good     []           Fair      []           Poor Weight Changes: 3% x 2 months; 5% x 3 months; 11% x 9 months (intentional weight loss) Wt Readings from Last 10 Encounters:  
01/02/19 122.5 kg (270 lb)  
11/16/18 125.6 kg (277 lb) 10/10/18 126.8 kg (279 lb 9.6 oz) 09/14/18 113.4 kg (250 lb)  
06/20/18 128.6 kg (283 lb 8.2 oz) 04/21/18 137 kg (302 lb)  
01/24/18 137.9 kg (304 lb)  
11/30/17 139.3 kg (307 lb)  
09/26/17 134.4 kg (296 lb 6.4 oz) 09/07/17 133.8 kg (295 lb) Nutrition Problems Identified:[x]      None PLAN:  
 
[x]           Rescreen per screening protocol Rescreen:  []            At Nutrition Risk [x]            Not at Nutrition Risk, rescreen per screening protocol [address]

## 2019-01-02 NOTE — PROCEDURES
[facility] 
*** FINAL REPORT *** Name: Demetris Nina 
MRN: [id number]    Inpatient : 26 Mar 1955 HIS Order #: [id number] [facility] [place] Visit #: [id number] Date: 2019 TYPE OF TEST: Peripheral Venous Testing REASON FOR TEST Bilateral leg swelling. Right Leg:- 
Deep venous thrombosis:           No 
Superficial venous thrombosis:    No 
Deep venous insufficiency:        Not examined Superficial venous insufficiency: Not examined Left Leg:- 
Deep venous thrombosis:           No 
Superficial venous thrombosis:    No 
Deep venous insufficiency:        Not examined Superficial venous insufficiency: Not examined INTERPRETATION/FINDINGS 
PROCEDURE:  Color duplex ultrasound imaging of lower extremity veins. FINDINGS: 
     Right: The common femoral, deep femoral, femoral, popliteal, and 
great saphenous are patent and without evidence of thrombus;  each is 
fully compressible and there is no narrowing of the flow channel on 
color Doppler imaging. The posterior tibial and peroneal are patent 
distally, but not visualized proximally. Phasic flow is observed in 
the common femoral vein. Left:   The common femoral, deep femoral, femoral, popliteal, and 
 great saphenous are patent and without evidence of thrombus;  each is 
 fully compressible and there is no narrowing of the flow channel on 
color Doppler imaging. The posterior tibial and peroneal are patent 
distally, but not visualized proximally. Phasic flow is observed in 
the common femoral vein. IMPRESSION:  Limited study showing no evidence of right or left lower 
extremity vein thrombosis in the visualized vein segments. Calf DVT 
cannot be excluded on either side. ADDITIONAL COMMENTS I have personally reviewed the data relevant to the interpretation of 
this 
study. TECHNOLOGIST: Adina Ortega RVT Signed: 2019 04:29 PM 
 
PHYSICIAN: Maki Banda MD 
Signed: 2019 08:09 AM
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
## 2019-01-02 NOTE — PROGRESS NOTES
Problem: Falls - Risk of 
Goal: *Absence of Falls Document Mikhail Back Fall Risk and appropriate interventions in the flowsheet. Outcome: Progressing Towards Goal 
Fall Risk Interventions: 
Mobility Interventions: Communicate number of staff needed for ambulation/transfer Medication Interventions: Evaluate medications/consider consulting pharmacy History of Falls Interventions: Evaluate medications/consider consulting pharmacy

## 2019-01-02 NOTE — PROGRESS NOTES
PICC order received and reviewed. Spoke with primary RN at this time. Reports patient refusing PICC and that patient has IV access at this time. Primary RN to speak to hospitalist about order.

## 2019-01-02 NOTE — PROGRESS NOTES
Problem: Falls - Risk of 
Goal: *Absence of Falls Document Ebb Czech Fall Risk and appropriate interventions in the flowsheet. Outcome: Not Progressing Towards Goal 
Fall Risk Interventions:

## 2019-01-02 NOTE — PROGRESS NOTES
Primary Nurse Sharri Millan, ROYCE and Alondra Greenberg, RN performed a dual skin assessment on this patient Impairment noted- see wound doc flow sheet Naresh score is 21 Erythema and redness at R medial thigh knee and lower leg. Necrosis and scarring at bilateral shoulders Multiple tattoos L lateral healed surgical scars 1/2/19@ 0345- Pt K level= 2.9. Dr. Talisha engle.

## 2019-01-02 NOTE — PROGRESS NOTES
Pt. requested Pastoral services for someone to talk with. Pastoral contacted and will be up in about 30 minutes to see patient.

## 2019-01-03 NOTE — PROGRESS NOTES
Spiritual Care Assessment/Progress Note [address] 
 
 
NAME: Bev Madsen      MRN: [id number] AGE: 61 y.o. SEX: male Taoist Affiliation: Temple  
Language: English  
 
1/2/2019     Total Time (in minutes): 47  
 
Spiritual Assessment begun in [facility] through conversation with: 
  
    [x]Patient        [] Family    [] Friend(s) Reason for Consult: Request by patient Spiritual beliefs: (Please include comment if needed) [x] Identifies with a constance tradition:     
   [] Supported by a constance community:        
   [] Claims no spiritual orientation:       
   [] Seeking spiritual identity:            
   [] Adheres to an individual form of spirituality:       
   [] Not able to assess:                   
 
    
Identified resources for coping:  
   [x] Prayer                           
   [] Music                  [] Guided Imagery [x] Family/friends                 [] Pet visits [] Devotional reading                         [] Unknown 
   [] Other:                                          
 
 
Interventions offered during this visit: (See comments for more details) Patient Interventions: Affirmation of emotions/emotional suffering, Affirmation of constance, Iconic (affirming the presence of God/Higher Power), Life review/legacy, Initial/Spiritual assessment, patient floor, Issues around forgiveness/closure, Prayer (assurance of), Normalization of emotional/spiritual concerns Plan of Care: 
 
 [x] Support spiritual and/or cultural needs  
 [] Support AMD and/or advance care planning process    
 [] Support grieving process 
 [] Coordinate Rites and/or Rituals  
 [] Coordination with community clergy [] No spiritual needs identified at this time 
 [] Detailed Plan of Care below (See Comments)  [] Make referral to Music Therapy 
[] Make referral to Pet Therapy    
[] Make referral to Addiction services 
[] Make referral to [facility] [] Make referral to Spiritual Care Partner 
[] No future visits requested       
[x] Follow up visits as needed Comments:  responded to pt request. Pt shared that he has been going through a lot and isn't able to talk to anyone that is close to him about what he is going through. Pt has had many challenging experiences in his life and wanting to process his struggles. Pt mentioned that he got emotional and has not cried in maybe 30 years. Pt struggles with addiction but seems open to help.  provided pastoral listening and comfort and assurance of prayer. Pt requested visit tomorrow if possible.  follow up as needed. Sidra Andrews, [facility] 
 [phone])

## 2019-01-03 NOTE — PROGRESS NOTES
Unable to obtain iv access. Patient refused central line. Hospitalist called. Spoke with Dr. Holli Decker. Changed antibiotic med back to IM and will speak to am hospitalist tomorrow
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
## 2019-02-02 PROBLEM — K92.2 GI BLEED: Status: ACTIVE | Noted: 2019-01-01

## 2019-02-02 NOTE — ED TRIAGE NOTES
Pt states mid abd pain w/ N/V/D and posterior head pain X 3-4 days. Pt admits to heroine use 2 days ago.

## 2019-02-02 NOTE — PROGRESS NOTES
See anesthesia reports  Received report from anesthesia staff on vital signs and status of patient. Patient remains in CCU 7567. He remains on ventilator and sedated per Dr. Yessica Petersen and Temo Patel

## 2019-02-02 NOTE — PROGRESS NOTES
Pt seen after agd. inr 1.7  Will add vitamin k 10mg iv daily x 3.  montiro coags daily. hh up from earlier today.

## 2019-02-02 NOTE — PROGRESS NOTES
1000  Pt arrived from ED via stretcher. 1005  Dr. Dann Shafer at bedside 1018  1st unit PRBC's started 1042  Blood complete 1046  2nd unit PRBC's started. 200  Dr. Shayy Bean at bedside to evaluate pt. 
1110  Anesthesia and Endo at bedside 1150  Procedure complete and report received from Dr. Dann Shafer, anesthesia and nursing. Pt cleaned and linen changed. Pt to remain intubated overnight to protect airway. 1600  Pt vomited large amount of blood and clots around ETT x 2.  HR still 140's. CVP transduced and CVP 11.  Pt restless at times on 50mcg/kg/min of propofol. Paged Dr. Lupe Rubalcava to discuss. 36  Spoke with Dr. Dann Shafer to update on pt condition. Order received for [facility]. 
1700  Spoke with Dr. Lupe Rubalcava regarding pt HR and restlessness. Requested additional sedation, possibly precedex. Order received for fentanyl gtt. 1800  Family at bedside. Discussed events of day with son. Made aware that pt has been a heroin user for many years, but does not use ETOH. Will pass this info along so MD may consider discontinuing goody bag and PRN ativan.

## 2019-02-02 NOTE — ED TRIAGE NOTES
Pt presents via EMS from [facility]. Pt's heart rate is in the 140's; respiratory rate is in the upper 20's. Pt is diaphoretic. Pt asked to get up to bedside commode to defecate; however, he was too weak to sit up. Pt placed back on stretcher. Pt is vomiting bright red blood.

## 2019-02-02 NOTE — ANESTHESIA POSTPROCEDURE EVALUATION
Procedure(s): ESOPHAGOGASTRODUODENOSCOPY (EGD) ENDOSCOPIC BANDING OR LIGATION X4. Anesthesia Post Evaluation Patient location during evaluation: PACU Note status: Adequate. Level of consciousness: responsive to verbal stimuli and sleepy but conscious Pain management: satisfactory to patient Airway patency: patent Anesthetic complications: no 
Cardiovascular status: acceptable Respiratory status: acceptable Hydration status: acceptable Comments: +Post-Anesthesia Evaluation and Assessment Patient: Irineo Arthur MRN: [id number]  SSN: [id number] YOB: 1955  Age: 61 y.o. Sex: male Cardiovascular Function/Vital Signs /88   Pulse (!) 142   Temp 36.8 °C (98.3 °F)   Resp 22   SpO2 100% Patient is status post Procedure(s): ESOPHAGOGASTRODUODENOSCOPY (EGD) ENDOSCOPIC BANDING OR LIGATION X4. Nausea/Vomiting: Controlled. Postoperative hydration reviewed and adequate. Pain: 
Pain Scale 1: Visual (02/02/19 1050) Pain Intensity 1: 0 (02/02/19 1050) Managed. Neurological Status: At baseline. Mental Status and Level of Consciousness: Arousable. Pulmonary Status:  
O2 Device: Room air (02/02/19 0816) Adequate oxygenation and airway patent. Complications related to anesthesia: None Post-anesthesia assessment completed. Pt to remain intubated due to risk of aspiration secondary to copious blood in stomach. Signed By: Giancarlo Rodriguez MD  
 2/2/2019 Post anesthesia nausea and vomiting:  controlled Visit Vitals /88 Pulse (!) 142 Temp 36.8 °C (98.3 °F) Resp 22 SpO2 100%

## 2019-02-02 NOTE — ED NOTES
Additional attempts to obtain iv access. Dr. Tammy Butler performed venous access from left groin area sucessfully
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
## 2019-02-02 NOTE — PROGRESS NOTES
TRANSFER - IN REPORT: Patient remains in room [address] Verbal report received from [facility] RN(name) on [facility]  being received from CCU(unit) for ordered procedure Report consisted of patients Situation, Background, Assessment and  
Recommendations(SBAR). Information from the following report(s) SBAR, Kardex, Intake/Output, Recent Results and Cardiac Rhythm sinus tack was reviewed with the receiving nurse. Opportunity for questions and clarification was provided. Assessment completed upon patients arrival to unit and care assumed.
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
## 2019-02-02 NOTE — ED NOTES
TRANSFER - OUT REPORT: 
 
Verbal report given to Harvey Pepper RN(name) on Babar Goodman  being transferred to [facility] (unit) for routine progression of care Report consisted of patients Situation, Background, Assessment and  
Recommendations(SBAR). Information from the following report(s) SBAR, Kardex, [facility] and Recent Results was reviewed with the receiving nurse. Lines:  
Triple Lumen 02/02/19 Right Subclavian (Active) Central Line Being Utilized Yes 2/2/2019  5:29 AM  
Site Assessment Clean, dry, & intact 2/2/2019  5:29 AM  
Infiltration Assessment 0 2/2/2019  5:29 AM  
Dressing Status Clean, dry, & intact 2/2/2019  5:29 AM  
Dressing Type Transparent 2/2/2019  5:29 AM  
Proximal Hub Color/Line Status Brown 2/2/2019  5:29 AM  
Positive Blood Return (Medial Site) Yes 2/2/2019  5:29 AM  
Medial Hub Color/Line Status Blue 2/2/2019  5:29 AM  
Positive Blood Return (Lateral Site) Yes 2/2/2019  5:29 AM  
Distal Hub Color/Line Status White 2/2/2019  5:29 AM  
Positive Blood Return (Site #3) Yes 2/2/2019  5:29 AM  
  
 
Opportunity for questions and clarification was provided. Patient transported with: 
 ekg monitor, iv antibiotic, patient belongings, emtala form
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
## 2019-02-02 NOTE — PROCEDURES
Esophagogastroduodenoscopy Procedure Note Jaimie Jewell 1955 
[id number] Indication:  Acute upper GI bleed Endoscopist: Angelia Rasheed MD 
 
Referring Provider:  Anaya Zamudio MD 
 
Sedation:  MAC anesthesia Propofol; Pt intubated in ICU prior to EGD by anesthesia Procedure Details: After infomed consent was obtained for the procedure, with all risks and benefits of procedure explained the patient was taken to the endoscopy suite and placed in the left lateral decubitus position. Following sequential administration of sedation as per above, the endoscope was inserted into the mouth and advanced under direct vision to second portion of the duodenum. A careful inspection was made as the gastroscope was withdrawn, including a retroflexed view of the proximal stomach; findings and interventions are described below. Findings: There was fresh red blood near the EG junction from just proximal to this in the distal esophagus. Attempts at visualizing the exact cause were difficult due to active bleeding but did not see any MW tear and no obvious varices initially. We then moved the scope down into the stomach where we saw large amount of dark clots and blood in the fundus of the stomach. We attempted to retroflex and visualize the EG junction but saw blood coming from the distal esophagus/EG junction. Could not visualize the cardia to see if varices seen in stomach. We were able to traverse the pylorus and saw red blood in duodenum but no ulcers in stomach or duodenum. Patient then vomited up red blood and clots but ETT was in place. We rescoped patient and saw faint images of some varices just proximal to EG junction but more blood. Scope removed and we planned on placing bands on varices in distal esophagus. We placed a total of 4 bands just proximal to the EG junction and saw no further bleeding and this appeared to be the source (esophageal varices). Therapies:  See above Specimen: none Complications:   None were encountered during the procedure. EBL:  < 100 ml. Recommendations:  
-would keep intubated for now given high risk for aspiration 
-continue octreotide drip 
-serial H/H and keep Hgb > 7 
-for rebleeding would need IR to consider TIPS Nick Rodríguez MD 
2/2/2019  11:54 AM

## 2019-02-02 NOTE — PROGRESS NOTES
TRANSFER - OUT REPORT: Remains in room 3495 Verbal report given to Viri RN(name) on Berl Nip  being transferred to CCU(unit) for routine post - op Report consisted of patients Situation, Background, Assessment and  
Recommendations(SBAR). Information from the following report(s) SBAR, MAR, Recent Results, Med Rec Status and Cardiac Rhythm NST was reviewed with the receiving nurse. Lines:    
 
Opportunity for questions and clarification was provided.

## 2019-02-02 NOTE — ANESTHESIA PREPROCEDURE EVALUATION
Anesthetic History Review of Systems / Medical History Patient summary reviewed, nursing notes reviewed and pertinent labs reviewed Pulmonary Smoker Neuro/Psych Psychiatric history Comments: Former heroin user Cardiovascular Hypertension Exercise tolerance: <4 METS 
  
GI/Hepatic/Renal 
  
 
Hepatitis: type C Liver disease Endo/Other Obesity Other Findings Comments: GI bleed, hematemesis x 1 Varices Physical Exam 
 
Airway Mallampati: II 
TM Distance: > 6 cm Neck ROM: normal range of motion Mouth opening: Normal 
 
 Cardiovascular Rhythm: regular Rate: normal 
 
 
 
 Dental 
 
Dentition: Edentulous Pulmonary Breath sounds clear to auscultation Abdominal 
Abdominal exam normal 
 
 
 Other Findings Anesthetic Plan ASA: 3 Anesthesia type: general 
 
 
 
 
Induction: Intravenous Anesthetic plan and risks discussed with: Patient

## 2019-02-02 NOTE — ED NOTES
Pt arrived to ED  with c/o generalized body aches with n/v x 3 days. Pt reports heroin use yesterday. Pt is in no acute distress. Will continue to monitor. See nursing assessment. Safety precautions in place; call light within reach. Emergency Department Nursing Plan of Care The Nursing Plan of Care is developed from the Nursing assessment and Emergency Department Attending provider initial evaluation. The plan of care may be reviewed in the ED Provider note. The Plan of Care was developed with the following considerations:  
Patient / Family readiness to learn indicated by:verbalized understanding Persons(s) to be included in education: patient and family Barriers to Learning/Limitations:No 
 
Signed Yessica Eldridge RN   
2/2/2019   3:46 AM

## 2019-02-02 NOTE — ED NOTES
TRANSFER - OUT REPORT: 
 
Verbal report given to Adia RN (name) on Berl Nip  being transferred to Room ([phone] CCU unit) for routine progression of care Report consisted ofpatients Situation, Background, Assessment and  
Recommendations(SBAR). Information from the following report(s) SBAR, ED Summary, Intake/Output and Recent Results was reviewed with the receiving nurse. Lines:  
Triple Lumen 02/02/19 Right Subclavian (Active) Central Line Being Utilized Yes 2/2/2019  5:29 AM  
Site Assessment Clean, dry, & intact 2/2/2019  5:29 AM  
Infiltration Assessment 0 2/2/2019  5:29 AM  
Dressing Status Clean, dry, & intact 2/2/2019  5:29 AM  
Dressing Type Transparent 2/2/2019  5:29 AM  
Proximal Hub Color/Line Status Brown 2/2/2019  5:29 AM  
Positive Blood Return (Medial Site) Yes 2/2/2019  5:29 AM  
Medial Hub Color/Line Status Blue 2/2/2019  5:29 AM  
Positive Blood Return (Lateral Site) Yes 2/2/2019  5:29 AM  
Distal Hub Color/Line Status White 2/2/2019  5:29 AM  
Positive Blood Return (Site #3) Yes 2/2/2019  5:29 AM  
  
 
Opportunity for questions and clarification was provided. Patient transported with: 
 Monitor Registered Nurse
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
## 2019-02-02 NOTE — PROGRESS NOTES
2/2/2019 INTENSIVIST PROGRESS NOTE:  
 
Patient seen and evaluated, chart reviewed 62 yo male presented with UGI bleed, hematemesis EGD per GI Now pt in CCU intubated, sedated ROS: unable to obtain Visit Vitals /67 Pulse (!) 133 Temp 98.5 °F (36.9 °C) Resp 14 SpO2 100% General: sedated, intubated Eyes: anicteric HEENT: ETT in place Neck: FROM 
CV: RRR Lungs: clear Abd: soft : no flank pain Ext: no edema Skin: no rash Musculoskeletal: normal inspection Neuro: sedated CXR: clear Labs reviewed A/P: 
- acute respiratory failure: vent support for airway protection 
- UGI bleed: transfuse as needed, IV protonix, IV octreotide 
- esophageal varices: IV octreotide 
- IVF 
- sedation  
- critically ill - Will assist on disposition planning when stable for transfer Kasi Aguilera MD

## 2019-02-03 NOTE — PROGRESS NOTES
Hospitalist Progress Note NAME: Jaimie Jewell :  1955 MRN:  [id number] Assessment / Plan: 
Sepsis, POA Acute blood loss anemia, variceal - sp egd,  cotn octreotide. Cont to monitor hh.  Gi following. Vitamin k x 3 days Cholecystitis - us abdomen pending. Acute pancreatitis by CT - cont iv fluids, lipase wnl Liver cirrhosis, portal HTN Hx of hep C, reports completed treatment 
-s/p CVC placement  
 
-afp pending to ro hcc 
 
 
  
Acute renal failure 
-due pre-renal in the setting of blood loss Cont iv fluids. 
  
HTN 
-hold home meds due to risk of hemodynamic instability 
-IV prn hydralazine 
  
Leukocytosis 
-likely reactive from Gi bleed Cont rocephin and add flagyl 
  
Hx of IVDU 
-check UDS 
  
Tobacco use Alcohol use  
-cessation counseled 
-nicotine patch, CIWA protocol, banana bag, fenatnyl 40 or above Morbid obesity / Body mass index is 40.09 kg/m². Code status: Full Prophylaxis: SCD's Recommended Disposition: Home w/Family Subjective: Chief Complaint / Reason for Physician Visit \"remains sedated but tachycardic on multiple sedatives. hh relatively stable and no events of acute bleeding overnight\". Discussed with RN events overnight. Review of Systems: 
Symptom Y/N Comments  Symptom Y/N Comments Fever/Chills    Chest Pain Poor Appetite    Edema Cough    Abdominal Pain Sputum    Joint Pain SOB/DOSHI    Pruritis/Rash Nausea/vomit    Tolerating PT/OT Diarrhea    Tolerating Diet Constipation    Other Could NOT obtain due to:   
 
Objective: VITALS:  
Last 24hrs VS reviewed since prior progress note. Most recent are: 
Patient Vitals for the past 24 hrs: 
 Temp Pulse Resp BP SpO2  
19 1000  (!) 147 25 112/64 99 % 19 0900  (!) 148 27 125/62 97 % 19 0800  (!) 153 27 127/80 100 % 19 0748  (!) 152 27  100 % 19 0700  (!) 153 27 128/73 100 % 02/03/19 0630  (!) 153 27 131/77 100 % 02/03/19 0600  (!) 151 25 127/75 100 % 02/03/19 0530  (!) 149 27 132/76 100 % 02/03/19 0500  (!) 150 26 136/74 100 % 02/03/19 0449  (!) 150 27  100 % 02/03/19 0430  (!) 151 26 132/74 100 % 02/03/19 0400  (!) 150 25 129/73 100 % 02/03/19 0330  (!) 147 27 133/73 100 % 02/03/19 0300 99.3 °F (37.4 °C) (!) 149 26 130/73 100 % 02/03/19 0230  (!) 146 25 125/76 100 % 02/03/19 0200  (!) 147 26 123/69 100 % 02/03/19 0130  (!) 147 26 124/64 100 % 02/03/19 0100  (!) 145 25 122/61 100 % 02/03/19 0030  (!) 146 25 111/65 100 % 02/03/19 0000  (!) 148 25  100 % 02/02/19 2346  (!) 147 24  100 % 02/02/19 2330 99 °F (37.2 °C) (!) 150 27 130/69 100 % 02/02/19 2300  (!) 148 29 127/63 100 % 02/02/19 2230  (!) 149 27 131/66 100 % 02/02/19 2200  (!) 150 26 124/64 100 % 02/02/19 2130  (!) 148 26 123/62 100 % 02/02/19 2100  (!) 160 (!) 32 123/59 100 % 02/02/19 2030  (!) 149 26 163/86 100 % 02/02/19 2001  (!) 149 26  100 % 02/02/19 2000 98.3 °F (36.8 °C) (!) 151 25 162/82 100 % 02/02/19 1950     100 % 02/02/19 1930  (!) 149 26 149/80 100 % 02/02/19 1900  (!) 146 25 158/80 100 % 02/02/19 1830  (!) 157 22 (!) 149/93 100 % 02/02/19 1806  (!) 150 24  100 % 02/02/19 1800  (!) 144 24 152/77 100 % 02/02/19 1730  (!) 144 22 142/87 100 % 02/02/19 1700  (!) 147 24 144/82 100 % 02/02/19 1630  (!) 149  138/82   
02/02/19 1600 98.1 °F (36.7 °C) (!) 146 23 144/90   
02/02/19 1530  (!) 144 29 131/86   
02/02/19 1500  (!) 144 20 127/89 100 % 02/02/19 1400  (!) 142 22 137/88 100 % 02/02/19 1300  (!) 139 22 122/71   
02/02/19 1245  (!) 139 21 131/74 (!) 86 % 02/02/19 1230 97.7 °F (36.5 °C) (!) 150 26 (!) 134/100 100 % 02/02/19 1219  (!) 138 22  100 % 02/02/19 1215  (!) 133 23 127/66 100 % 02/02/19 1205 98.3 °F (36.8 °C) (!) 133 14 120/60 100 % 02/02/19 1157  (!) 133 14 140/67 100 % 02/02/19 1130 97.8 °F (36.6 °C) (!) 131 20 (!) 124/106 100 % 02/02/19 1120  (!) 143 18 183/85 100 % 02/02/19 1115 98.5 °F (36.9 °C) (!) 145 20 171/82 100 % 02/02/19 1110  (!) 146 22 158/85 100 % 02/02/19 1100 97.7 °F (36.5 °C) (!) 136 25 (!) 85/63 100 % 02/02/19 1057  (!) 131 25 184/83 100 % 02/02/19 1055  (!) 131 19 (!) 150/95 100 % Intake/Output Summary (Last 24 hours) at 2/3/2019 1050 Last data filed at 2/3/2019 0700 Gross per 24 hour Intake 5790.1 ml Output 1335 ml Net 4455.1 ml PHYSICAL EXAM: 
General: Sedated and intubated.   
EENT:  EOMI. Anicteric sclerae. MMM Resp:  CTA bilaterally, no wheezing or rales. No accessory muscle use CV:  Regular  rhythm,  No edema GI:  Soft, Non distended, Non tender.  +Bowel sounds Neurologic:  sedated Skin:  No rashes. No jaundice Reviewed most current lab test results and cultures  YES Reviewed most current radiology test results   YES Review and summation of old records today    NO Reviewed patient's current orders and MAR    YES 
PMH/ reviewed - no change compared to H&P 
________________________________________________________________________ Care Plan discussed with: 
  Comments Patient Family RN Care Manager Consultant Multidiciplinary team rounds were held today with , nursing, pharmacist and clinical coordinator. Patient's plan of care was discussed; medications were reviewed and discharge planning was addressed. ________________________________________________________________________ Total  critical care TIME:  30   Minutes Total CRITICAL CARE TIME Spent: 31  Minutes non procedure based Comments >50% of visit spent in counseling and coordination of care    
________________________________________________________________________ Tammy Backbone, DO  
 
Procedures: see electronic medical records for all procedures/Xrays and details which were not copied into this note but were reviewed prior to creation of Plan. LABS: 
I reviewed today's most current labs and imaging studies. Pertinent labs include: 
Recent Labs 02/03/19 
0319 02/02/19 
2056 02/02/19 
1335 02/02/19 
0609 WBC 32.1*  --  25.9* 17.1* HGB 8.0* 8.5* 8.6* 6.2* HCT 24.6* 25.3* 26.0* 18.5*   --  172 175 Recent Labs 02/03/19 
8001 02/02/19 
5101 02/02/19 
6497   --  143  
K 4.8  --  3.6 *  --  105 CO2 23  --  29 *  --  148* BUN 36*  --  28* CREA 1.49*  --  1.32* CA 7.1*  --  8.9 ALB 2.0*  --  2.3* TBILI 1.7*  --  1.3*  
SGOT 263*  --  83* *  --  46 INR  --  1.7*  --   
 
 
Signed: Arabella Fowler DO

## 2019-02-03 NOTE — PROGRESS NOTES
Problem: Pressure Injury - Risk of 
Goal: *Prevention of pressure injury Document Naresh Scale and appropriate interventions in the flowsheet. Outcome: Progressing Towards Goal 
Pressure Injury Interventions: 
Sensory Interventions: Assess changes in LOC, Assess need for specialty bed, Avoid rigorous massage over bony prominences, Check visual cues for pain, Discuss PT/OT consult with provider, Float heels, Keep linens dry and wrinkle-free, Maintain/enhance activity level, Minimize linen layers, Monitor skin under medical devices, Pad between skin to skin, Pressure redistribution bed/mattress (bed type), Turn and reposition approx. every two hours (pillows and wedges if needed), Use 30-degree side-lying position Moisture Interventions: Absorbent underpads, Apply protective barrier, creams and emollients, Assess need for specialty bed, Check for incontinence Q2 hours and as needed, Contain wound drainage, Internal/External urinary devices, Limit adult briefs, Maintain skin hydration (lotion/cream), Minimize layers, Moisture barrier, Offer toileting Q_hr Activity Interventions: Assess need for specialty bed, Increase time out of bed, Pressure redistribution bed/mattress(bed type), PT/OT evaluation Mobility Interventions: Assess need for specialty bed, Float heels, HOB 30 degrees or less, Pressure redistribution bed/mattress (bed type), PT/OT evaluation, Turn and reposition approx. every two hours(pillow and wedges) Nutrition Interventions: Document food/fluid/supplement intake, Discuss nutritional consult with provider, Offer support with meals,snacks and hydration Friction and Shear Interventions: Apply protective barrier, creams and emollients, Feet elevated on foot rest, Foam dressings/transparent film/skin sealants, HOB 30 degrees or less, Lift sheet, Lift team/patient mobility team, Minimize layers, Transferring/repositioning devices Problem: Falls - Risk of 
 Goal: *Absence of Falls Document Gayla Shane Fall Risk and appropriate interventions in the flowsheet. Outcome: Progressing Towards Goal 
Fall Risk Interventions: 
Mobility Interventions: Bed/chair exit alarm, Communicate number of staff needed for ambulation/transfer, OT consult for ADLs, Patient to call before getting OOB, PT Consult for mobility concerns, PT Consult for assist device competence, Strengthening exercises (ROM-active/passive) Mentation Interventions: Adequate sleep, hydration, pain control, Bed/chair exit alarm, Door open when patient unattended, Evaluate medications/consider consulting pharmacy, More frequent rounding, Reorient patient, Room close to nurse's station, Update white board, Toileting rounds Medication Interventions: Bed/chair exit alarm, Evaluate medications/consider consulting pharmacy Elimination Interventions: Bed/chair exit alarm, Call light in reach, Patient to call for help with toileting needs, Toileting schedule/hourly rounds History of Falls Interventions: Bed/chair exit alarm, Consult care management for discharge planning, Door open when patient unattended, Evaluate medications/consider consulting pharmacy, Investigate reason for fall, Room close to nurse's station, Utilize gait belt for transfer/ambulation

## 2019-02-03 NOTE — PROGRESS NOTES
Gastroenterology Daily Progress Note (Dr. Jason Cisse) [facility] Admit Date: 2/2/2019 Subjective:   
  
Patient remains intubated and tachycardic to 150s. No overt bleeding per nursing. Current Facility-Administered Medications Medication Dose Route Frequency  metoprolol (LOPRESSOR) injection 5 mg  5 mg IntraVENous Q6H  
 octreotide (SANDOSTATIN) 500 mcg in 0.9% sodium chloride 500 mL infusion  50 mcg/hr IntraVENous CONTINUOUS  
 0.9% sodium chloride infusion 250 mL  250 mL IntraVENous PRN  pantoprazole (PROTONIX) 40 mg in sodium chloride 0.9% 10 mL injection  40 mg IntraVENous Q12H  
 0.9% sodium chloride infusion  125 mL/hr IntraVENous CONTINUOUS  
 cefTRIAXone (ROCEPHIN) 1 g in 0.9% sodium chloride (MBP/ADV) 50 mL  1 g IntraVENous Q24H  
 sodium chloride (NS) flush 5-40 mL  5-40 mL IntraVENous Q8H  
 sodium chloride (NS) flush 5-40 mL  5-40 mL IntraVENous PRN  
 acetaminophen (TYLENOL) suppository 650 mg  650 mg Rectal Q4H PRN  
 hydrALAZINE (APRESOLINE) 20 mg/mL injection 10 mg  10 mg IntraVENous Q6H PRN  
 0.9% sodium chloride 8,835 mL with folic acid 1 mg, thiamine 100 mg, mvi, adult no. 4 with vit K 10 mL infusion   IntraVENous DAILY  nicotine (NICODERM CQ) 21 mg/24 hr patch 1 Patch  1 Patch TransDERmal DAILY  0.9% sodium chloride infusion 250 mL  250 mL IntraVENous PRN  
 0.9% sodium chloride infusion 250 mL  250 mL IntraVENous PRN  propofol (DIPRIVAN) infusion  0-50 mcg/kg/min IntraVENous TITRATE  fentaNYL (PF) 1,500 mcg/30 mL (50 mcg/mL) infusion  0-200 mcg/hr IntraVENous TITRATE  phytonadione (vitamin K1) (AQUA-MEPHYTON) 10 mg in 0.9% sodium chloride 50 mL IVPB  10 mg IntraVENous DAILY  LORazepam (ATIVAN) injection 2 mg  2 mg IntraVENous Q1H PRN  
 LORazepam (ATIVAN) injection 4 mg  4 mg IntraVENous Q1H PRN  
 LORazepam (ATIVAN) 2 mg/mL injection Objective:  
 
Visit Vitals /73 Pulse (!) 152 Temp 99.3 °F (37.4 °C) Resp 27 Wt 116.1 kg (255 lb 15.3 oz) SpO2 100% BMI 40.09 kg/m² Blood pressure 128/73, pulse (!) 152, temperature 99.3 °F (37.4 °C), resp. rate 27, weight 116.1 kg (255 lb 15.3 oz), SpO2 100 %. No intake/output data recorded. 02/01 1901 - 02/03 0700 In: 0904 [I.V.:5488.4] Out: 1335 [[facility]] Intake/Output Summary (Last 24 hours) at 2/3/2019 0277 Last data filed at 2/3/2019 0700 Gross per 24 hour Intake 6238.03 ml Output 1335 ml Net 4903.03 ml Physical Exam:  
 
General: sedated, intubated M in NAD Chest:  CTA, No rhonchi, rales or rubs. Heart: S1, S2, RRR 
GI: Soft, NT, ND + bowel sounds Labs:  
 
 
Recent Results (from the past 24 hour(s)) CBC WITH AUTOMATED DIFF Collection Time: 02/02/19  8:43 AM  
Result Value Ref Range WBC 17.1 (H) 4.1 - 11.1 K/uL  
 RBC 1.91 (L) 4.10 - 5.70 M/uL HGB 6.2 (L) 12.1 - 17.0 g/dL HCT 18.5 (L) 36.6 - 50.3 % MCV 96.9 80.0 - 99.0 FL  
 MCH 32.5 26.0 - 34.0 PG  
 MCHC 33.5 30.0 - 36.5 g/dL  
 RDW 14.1 11.5 - 14.5 % PLATELET 490 854 - 633 K/uL MPV 11.8 8.9 - 12.9 FL  
 NRBC 0.1 (H) 0  WBC ABSOLUTE NRBC 0.02 (H) 0.00 - 0.01 K/uL NEUTROPHILS 68 32 - 75 % LYMPHOCYTES 21 12 - 49 % MONOCYTES 10 5 - 13 % EOSINOPHILS 0 0 - 7 % BASOPHILS 0 0 - 1 % IMMATURE GRANULOCYTES 1 (H) 0.0 - 0.5 % ABS. NEUTROPHILS 11.6 (H) 1.8 - 8.0 K/UL  
 ABS. LYMPHOCYTES 3.6 (H) 0.8 - 3.5 K/UL  
 ABS. MONOCYTES 1.7 (H) 0.0 - 1.0 K/UL  
 ABS. EOSINOPHILS 0.0 0.0 - 0.4 K/UL  
 ABS. BASOPHILS 0.0 0.0 - 0.1 K/UL  
 ABS. IMM. GRANS. 0.2 (H) 0.00 - 0.04 K/UL  
 DF AUTOMATED    
 RBC COMMENTS NORMOCYTIC, NORMOCHROMIC PROTHROMBIN TIME + INR Collection Time: 02/02/19  8:43 AM  
Result Value Ref Range INR 1.7 (H) 0.9 - 1.1 Prothrombin time 16.7 (H) 9.0 - 11.1 sec PTT Collection Time: 02/02/19  8:43 AM  
Result Value Ref Range aPTT 27.2 22.1 - 32.0 sec aPTT, therapeutic range     58.0 - 77.0 SECS  
AMMONIA Collection Time: 02/02/19  8:43 AM  
Result Value Ref Range Ammonia 76 (H) <32 UMOL/L  
TYPE + CROSSMATCH Collection Time: 02/02/19  8:46 AM  
Result Value Ref Range Crossmatch Expiration 02/05/2019 ABO/Rh(D) B POSITIVE Antibody screen NEG Unit number [id number] Blood component type RC LR Unit division 00 Status of unit ALLOCATED Crossmatch result Compatible Unit number [id number] Blood component type RC LR Unit division 00 Status of unit TRANSFUSED Crossmatch result Compatible Unit number [id number] Blood component type RC LR Unit division 00 Status of unit TRANSFUSED Crossmatch result Compatible Unit number [id number] Blood component type RC LR Unit division 00 Status of unit ALLOCATED Crossmatch result Compatible ETHYL ALCOHOL Collection Time: 02/02/19  8:46 AM  
Result Value Ref Range ALCOHOL(ETHYL),SERUM <10 <10 MG/DL  
HEPATITIS PANEL, ACUTE Collection Time: 02/02/19  8:52 AM  
Result Value Ref Range Hepatitis A, IgM NONREACTIVE NR    
 __ Hepatitis B surface Ag 0.17 Index Hep B surface Ag Interp. NEGATIVE  NEG    
 __ Hepatitis B core, IgM NONREACTIVE NR    
 __ Hepatitis C virus Ab >11.00 Index Hep C  virus Ab Interp. REACTIVE (A) NR Hep C  virus Ab comment Method used is [facility] SAMPLES BEING HELD Collection Time: 02/02/19  9:00 AM  
Result Value Ref Range SAMPLES BEING HELD 1PST COMMENT Add-on orders for these samples will be processed based on acceptable specimen integrity and analyte stability, which may vary by analyte. CBC W/O DIFF Collection Time: 02/02/19  1:35 PM  
Result Value Ref Range WBC 25.9 (H) 4.1 - 11.1 K/uL  
 RBC 2.78 (L) 4.10 - 5.70 M/uL HGB 8.6 (L) 12.1 - 17.0 g/dL HCT 26.0 (L) 36.6 - 50.3 %  MCV 93.5 80.0 - 99.0 FL  
 MCH 30.9 26.0 - 34.0 PG  
 MCHC 33.1 30.0 - 36.5 g/dL  
 RDW 16.5 (H) 11.5 - 14.5 % PLATELET 683 044 - 351 K/uL MPV 11.7 8.9 - 12.9 FL  
 NRBC 0.2 (H) 0  WBC ABSOLUTE NRBC 0.05 (H) 0.00 - 0.01 K/uL BLOOD GAS, ARTERIAL Collection Time: 02/02/19  1:35 PM  
Result Value Ref Range pH 7.40 7.35 - 7.45    
 PCO2 36 35.0 - 45.0 mmHg PO2 157 (H) 80 - 100 mmHg O2 SAT 99 (H) 92 - 97 % BICARBONATE 22 22 - 26 mmol/L  
 BASE DEFICIT 2.4 mmol/L  
 O2 METHOD TRACH COL    
 FIO2 50 % MODE A/C Tidal volume 500 SET RATE 12    
 EPAP/CPAP/PEEP 6.0 Sample source ARTERIAL    
 SITE RIGHT RADIAL YOLANDA'S TEST YES    
URINALYSIS W/ REFLEX CULTURE Collection Time: 02/02/19  2:07 PM  
Result Value Ref Range Color DARK YELLOW Appearance CLEAR CLEAR Specific gravity 1.020 1.003 - 1.030    
 pH (UA) 6.0 5.0 - 8.0 Protein NEGATIVE  NEG mg/dL Glucose NEGATIVE  NEG mg/dL Ketone TRACE (A) NEG mg/dL Blood SMALL (A) NEG Urobilinogen 2.0 (H) 0.2 - 1.0 EU/dL Nitrites NEGATIVE  NEG Leukocyte Esterase NEGATIVE  NEG    
 WBC 5-10 0 - 4 /hpf  
 RBC 5-10 0 - 5 /hpf Epithelial cells FEW FEW /lpf Bacteria NEGATIVE  NEG /hpf  
 UA:UC IF INDICATED URINE CULTURE ORDERED (A) CNI    
DRUG SCREEN, URINE Collection Time: 02/02/19  2:07 PM  
Result Value Ref Range AMPHETAMINES NEGATIVE  NEG    
 BARBITURATES NEGATIVE  NEG BENZODIAZEPINES NEGATIVE  NEG    
 COCAINE NEGATIVE  NEG METHADONE POSITIVE (A) NEG    
 OPIATES POSITIVE (A) NEG    
 PCP(PHENCYCLIDINE) NEGATIVE  NEG    
 THC (TH-CANNABINOL) NEGATIVE  NEG Drug screen comment (NOTE) BILIRUBIN, CONFIRM Collection Time: 02/02/19  2:07 PM  
Result Value Ref Range Bilirubin UA, confirm NEGATIVE  NEG    
HGB & HCT Collection Time: 02/02/19  8:56 PM  
Result Value Ref Range HGB 8.5 (L) 12.1 - 17.0 g/dL HCT 25.3 (L) 36.6 - 50.3 % METABOLIC PANEL, COMPREHENSIVE  
 Collection Time: 02/03/19  3:19 AM  
Result Value Ref Range Sodium 145 136 - 145 mmol/L Potassium 4.8 3.5 - 5.1 mmol/L Chloride 113 (H) 97 - 108 mmol/L  
 CO2 23 21 - 32 mmol/L Anion gap 9 5 - 15 mmol/L Glucose 117 (H) 65 - 100 mg/dL BUN 36 (H) 6 - 20 MG/DL Creatinine 1.49 (H) 0.70 - 1.30 MG/DL  
 BUN/Creatinine ratio 24 (H) 12 - 20 GFR est AA 58 (L) >60 ml/min/1.73m2 GFR est non-AA 48 (L) >60 ml/min/1.73m2 Calcium 7.1 (L) 8.5 - 10.1 MG/DL Bilirubin, total 1.7 (H) 0.2 - 1.0 MG/DL  
 ALT (SGPT) 144 (H) 12 - 78 U/L  
 AST (SGOT) 263 (H) 15 - 37 U/L Alk. phosphatase 63 45 - 117 U/L Protein, total 6.2 (L) 6.4 - 8.2 g/dL Albumin 2.0 (L) 3.5 - 5.0 g/dL Globulin 4.2 (H) 2.0 - 4.0 g/dL A-G Ratio 0.5 (L) 1.1 - 2.2    
CBC WITH AUTOMATED DIFF Collection Time: 02/03/19  3:19 AM  
Result Value Ref Range WBC 32.1 (H) 4.1 - 11.1 K/uL  
 RBC 2.61 (L) 4.10 - 5.70 M/uL HGB 8.0 (L) 12.1 - 17.0 g/dL HCT 24.6 (L) 36.6 - 50.3 % MCV 94.3 80.0 - 99.0 FL  
 MCH 30.7 26.0 - 34.0 PG  
 MCHC 32.5 30.0 - 36.5 g/dL  
 RDW 18.5 (H) 11.5 - 14.5 % PLATELET 843 425 - 674 K/uL MPV 11.9 8.9 - 12.9 FL  
 NRBC 0.7 (H) 0  WBC ABSOLUTE NRBC 0.23 (H) 0.00 - 0.01 K/uL NEUTROPHILS 76 (H) 32 - 75 % BAND NEUTROPHILS 1 % LYMPHOCYTES 14 12 - 49 % MONOCYTES 8 5 - 13 % EOSINOPHILS 0 0 - 7 % BASOPHILS 0 0 - 1 % METAMYELOCYTES 1 % IMMATURE GRANULOCYTES 0 0.0 - 0.5 % ABS. NEUTROPHILS 24.7 (H) 1.8 - 8.0 K/UL  
 ABS. LYMPHOCYTES 4.5 (H) 0.8 - 3.5 K/UL  
 ABS. MONOCYTES 2.6 (H) 0.0 - 1.0 K/UL  
 ABS. EOSINOPHILS 0.0 0.0 - 0.4 K/UL  
 ABS. BASOPHILS 0.0 0.0 - 0.1 K/UL  
 ABS. IMM. GRANS. 0.0 0.00 - 0.04 K/UL  
 DF MANUAL    
 RBC COMMENTS ANISOCYTOSIS 1+ 
    
 RBC COMMENTS POLYCHROMASIA 1+ 
    
BLOOD GAS, ARTERIAL Collection Time: 02/03/19  5:54 AM  
Result Value Ref Range pH 7.50 (H) 7.35 - 7.45    
 PCO2 29 (L) 35.0 - 45.0 mmHg PO2 95 80 - 100 mmHg O2 SAT 98 (H) 92 - 97 % BICARBONATE 23 22 - 26 mmol/L  
 BASE EXCESS 0.6 mmol/L  
 O2 METHOD VENTILATOR    
 FIO2 50 % MODE A/C    
 SET RATE 12 SPONTANEOUS RATE 27.0 EPAP/CPAP/PEEP 6.0 Sample source ARTERIAL    
 SITE RIGHT RADIAL YOLANDA'S TEST YES Recent Labs 02/03/19 0319 02/02/19 
9472  143  
K 4.8 3.6 * 105 CO2 23 29 BUN 36* 28* CREA 1.49* 1.32* * 148* CA 7.1* 8.9 Recent Labs 02/02/19 
8724 INR 1.7* PTP 16.7* APTT 27.2 Recent Labs 02/03/19 0319 02/02/19 
9178 SGOT 263* 83* AP 63 76  
TP 6.2* 7.4 ALB 2.0* 2.3*  
GLOB 4.2* 5.1* LPSE  --  206 Recent Labs 02/03/19 
0554 02/02/19 
1335 PH 7.50* 7.40 PCO2 29* 36  
PO2 95 157* Ref. Range 2/2/2019 08:43 2/2/2019 13:35 2/2/2019 20:56 2/3/2019 03:19 HGB Latest Ref Range: 12.1 - 17.0 g/dL 6.2 (L) 8.6 (L) 8.5 (L) 8.0 (L) HCT Latest Ref Range: 36.6 - 50.3 % 18.5 (L) 26.0 (L) 25.3 (L) 24.6 (L) Impression: 
Acute Upper GI Hemorrhage secondary to esophageal varices s/p band ligation Tachycardia DTs Plan: 
No ongoing bleeding with stable H/H post EGD yesterday. Thus far received 2 units of prbcs and Hgb steady at 8 range. His tachycardia likely due to DTs. 
-continue octreotide drip 
-continue intubation for DTs with ativan per [facility] protocol 
-f/u AFP for abnormal liver lesion on CT and eventual MRI with contrast 
-IV PPI 
-H/H q8h Prachi Perales MD 
 
2/3/2019 [address] [facility], [address] [address] Loc: [phone]

## 2019-02-03 NOTE — PROGRESS NOTES
1900  Bedside report from Bam Mccarthy RN. 
 
2045  Pt 's, RR 30's. Pt opening eyes spontaneously, restless. Increased Fentanyl gtt to 150 no effect. CIWA Ativan is tablet form. Message sent to hospitalist. 
 
2115  Spoke with Dr. Fatou Manuel. Orders given to Ativan IV per CIWA protocol. 
 
0700  Bedside Report given to ROYCE Cunningham.

## 2019-02-03 NOTE — PROGRESS NOTES
2/3/2019 INTENSIVIST PROGRESS NOTE:  
 
Patient seen and evaluated, chart reviewed 62 yo male presented with UGI bleed, hematemesis EGD per GI No acute events overnight Remained intubated post procedure Now pt in CCU intubated, sedated ROS: unable to obtain Visit Vitals /73 Pulse (!) 152 Temp 99.3 °F (37.4 °C) Resp 27 Wt 116.1 kg (255 lb 15.3 oz) SpO2 100% BMI 40.09 kg/m² General: sedated, intubated Eyes: anicteric HEENT: ETT in place Neck: FROM 
CV: RRR Lungs: clear Abd: soft : no flank pain Ext: no edema Skin: no rash Musculoskeletal: normal inspection Neuro: sedated CXR: clear Labs reviewed A/P: 
- acute respiratory failure: vent support for airway protection 
- UGI bleed: transfuse as needed, IV protonix, IV octreotide 
- esophageal varices: IV octreotide - SYDNEY: IVF 
- BP control 
- sedation  
- critically ill - Will assist on disposition planning when stable for transfer Pankaj Headley MD

## 2019-02-03 NOTE — PROGRESS NOTES
0730 Report received from night nurse, Parker Boles RN. Assumed care of patient. 0800 Assessment as documented. Patient attempts to open eyes when name called, lifts eyebrows. Fentanyl at 150mcg, Propofol at 50 mcg. Spoke with Dr. Enrike Estevez about patient's HR, will start on Metoprolol IV. 
 
0900 Oral suction for moderate amount of brown secretions, no secretions from ETT. 
 
1300 Family at bedside. 1530 Weaning Propofol and Fentanyl as tolerated. Heart rate improving, now in 120's. 1830 Continuing to wean Propofol and Fentanyl as tolerated. 1900 Report given to on-coming nurse, Roxy Gilbert RN.

## 2019-02-03 NOTE — PROGRESS NOTES
02/03/19 7085 ABCDEF Bundle SBT Safety Screen Passed No  
SBT Screen Reason for Failure Agitation No SBTS patient's HR and BP increased.

## 2019-02-04 NOTE — PROGRESS NOTES
Hospitalist Progress Note NAME: Carmelina Hdz :  1955 MRN:  [id number] Assessment / Plan: 
Sepsis, POA Acute blood loss anemia, variceal - sp egd,  cont octreotide. Cont to monitor hh (stable). Gi following. Vitamin k x 3 days 
  
Cholecystitis - us abdomen nondiagnositic. Wbc remains elevated on zosyn. rpeat ct abd pending. . 
  
Acute pancreatitis by CT - cont iv fluids, lipase wnl Liver cirrhosis, portal HTN Hx of hep C, reports completed treatment 
-s/p CVC placement  
  
-afp pending to ro [facility] 
  
  
  
Acute renal failure 
-due pre-renal in the setting of blood loss Cont iv fluids. 
  
HTN 
-hold home meds due to risk of hemodynamic instability 
-IV prn hydralazine 
  
Leukocytosis 
- repeat ct pending to r/o acut echole/pancreatitis. Cont rocephin and add flagyl 
  
Hx of IVDU 
-check UDS 
  
Tobacco use Alcohol use  
-cessation counseled 
-nicotine patch, CIWA protocol, banana bag, fenatnyl 
  
 
 
40 or above Morbid obesity / Body mass index is 40.09 kg/m². Code status: Full Prophylaxis: SCD's Recommended Disposition: Home w/Family Subjective: Chief Complaint / Reason for Physician Visit \"intubated and sedated. \". Discussed with RN events overnight. Review of Systems: 
Symptom Y/N Comments  Symptom Y/N Comments Fever/Chills    Chest Pain Poor Appetite    Edema Cough    Abdominal Pain Sputum    Joint Pain SOB/DOSHI    Pruritis/Rash Nausea/vomit    Tolerating PT/OT Diarrhea    Tolerating Diet Constipation    Other Could NOT obtain due to:   
 
Objective: VITALS:  
Last 24hrs VS reviewed since prior progress note. Most recent are: 
Patient Vitals for the past 24 hrs: 
 Temp Pulse Resp BP SpO2  
19 1200  (!) 136 28 136/79 100 % 19 1131  (!) 136 29  100 % 19 0822  (!) 128 27  100 % 19 0700  (!) 123 25 121/70 100 % 19 0600  (!) 126 24 111/72   
19 0500  (!) 127 24 114/63  02/04/19 0400 98.6 °F (37 °C) (!) 123 26 109/61 100 % 02/04/19 0300  (!) 124 23 98/59 100 % 02/04/19 0236  (!) 124 23  100 % 02/04/19 0200  (!) 138 26 126/69 100 % 02/04/19 0100  (!) 135 25 108/69 100 % 02/04/19 0000 98.7 °F (37.1 °C) (!) 132 24  98 % 02/03/19 2355 98.7 °F (37.1 °C)      
02/03/19 2353  (!) 131 26  97 % 02/03/19 2300  (!) 139 25 103/55 99 % 02/03/19 2200  (!) 140 26 124/72 100 % 02/03/19 2100  (!) 142 25 118/70 98 % 02/03/19 2023  (!) 130 24  99 % 02/03/19 2000 98.5 °F (36.9 °C) (!) 131 26 121/64 100 % 02/03/19 1900  (!) 123 23 113/57 99 % 02/03/19 1810  (!) 129  130/68   
02/03/19 1800  (!) 134 24 130/68 99 % 02/03/19 1730  (!) 134 24 129/72 99 % 02/03/19 1700  (!) 133 24 113/66 99 % 02/03/19 1600 98.6 °F (37 °C) (!) 130 24 112/66 100 % 02/03/19 1537  (!) 129 24  100 % 02/03/19 1530  (!) 129 23 112/63 100 % 02/03/19 1500  (!) 126 23 112/64 100 % 02/03/19 1430  (!) 125 21 112/66 100 % 02/03/19 1400  (!) 133 22 110/60 99 % 02/03/19 1330  (!) 133 24 121/76 99 % 02/03/19 1300  (!) 133 24 124/70 99 % Intake/Output Summary (Last 24 hours) at 2/4/2019 1259 Last data filed at 2/4/2019 1055 Gross per 24 hour Intake 4326.46 ml Output 1590 ml Net 2736.46 ml PHYSICAL EXAM: 
General: Intubated and sedates EENT:  EOMI. Anicteric sclerae. MMM Resp:  CTA bilaterally, no wheezing or rales. No accessory muscle use CV:  Regular  rhythm,  No edema GI:  Soft, Non distended, Non tender.  +Bowel sounds Neurologic:  sedated Psych:   Good insight. Not anxious nor agitated Skin:  No rashes. No jaundice Reviewed most current lab test results and cultures  YES Reviewed most current radiology test results   YES Review and summation of old records today    NO Reviewed patient's current orders and MAR    YES 
PMH/SH reviewed - no change compared to H&P 
 ________________________________________________________________________ Care Plan discussed with: 
  Comments Patient Family RN Care Manager Consultant Multidiciplinary team rounds were held today with , nursing, pharmacist and clinical coordinator. Patient's plan of care was discussed; medications were reviewed and discharge planning was addressed. ________________________________________________________________________ Total NON critical care TIME:  20   Minutes Total CRITICAL CARE TIME Spent:   Minutes non procedure based Comments >50% of visit spent in counseling and coordination of care    
________________________________________________________________________ Tammy Backbone, DO  
 
Procedures: see electronic medical records for all procedures/Xrays and details which were not copied into this note but were reviewed prior to creation of Plan. LABS: 
I reviewed today's most current labs and imaging studies. Pertinent labs include: 
Recent Labs 02/04/19 
9994 02/03/19 0319 02/02/19 2056 02/02/19 
1335 WBC 33.8* 32.1*  --  25.9* HGB 7.8* 8.0* 8.5* 8.6* HCT 24.5* 24.6* 25.3* 26.0*  
 173  --  172 Recent Labs 02/04/19 
0008 02/03/19 0319 02/02/19 
5520 02/02/19 
3104  145  --  143  
K 4.6 4.8  --  3.6 * 113*  --  105 CO2 21 23  --  29 * 117*  --  148* BUN 48* 36*  --  28* CREA 1.87* 1.49*  --  1.32* CA 6.6* 7.1*  --  8.9 ALB 1.9* 2.0*  --  2.3* TBILI 3.0* 1.7*  --  1.3*  
SGOT 1,737* 263*  --  83* * 144*  --  46 INR 1.7*  --  1.7*  --   
 
 
Signed: Tammy Backbone, DO

## 2019-02-04 NOTE — ROUTINE PROCESS
1640- Dr Mckeon Blinks in to perform paracentesis. Dr Candace Delacruz in to speak with family. Consent obtained per wife. Wife states that pt has allergy to Latex that has broken him out. Latex allergy placed on chart. Primary nurse Celso Arroyo updated. 1650- Specimen to lab as ordered. Pt etelvina procedure well.

## 2019-02-04 NOTE — PROGRESS NOTES
Pt is a 60 yo male admitted from home for evaluation/treatment of GI bleed. CM is aware of Readmission and full assessment to follow. SUSANNA Lynn

## 2019-02-04 NOTE — PROGRESS NOTES
1900 Report received from day nurse Yoseph Brito RN. Assumed care of patient. 2000 Shift assessment complete. Propofol infusing at 40 mcg and fentanyl at 100 mcg. Heart rate continues to run in 130s. 2030 Ativan given for CIWA of 8 and HR in 140s.  
 
0000 Scheduled metoprolol given, HR in 130s. 3726 Propofol held for SAT. 0630 SAT passed. Fentanyl held.

## 2019-02-04 NOTE — PROGRESS NOTES
Attempted to call patients spouse to obtain consent for paracentesis, no answer. No consent at this time.

## 2019-02-04 NOTE — PROGRESS NOTES
Paracentesis done earlier today. Per US tech the fluid was serous \"clear yellowish. \" Afternoon labs -- lipase 515, lactate 2.6, Hct 25. Abd exam still soft, ND, NABS. No pain response to deep palpation. Will need to await

## 2019-02-04 NOTE — PROGRESS NOTES
Critical care interdisciplinary rounds held on 02/04/2019. Following members present-  Pharmacy, Diabetes Treatment, Case Management, Respiratory Therapy,  Palliative Care and Nutrition. Led by Morales Chapman RN and Dr. Torito James. Plan of care discussed. See clinical pathway for plan of care and interventions and desired outcomes.

## 2019-02-04 NOTE — PROGRESS NOTES
0730 Report received assessed the patient d/c restraints due to patient not responding to nail bed pressure MD aware . 0930 wife updated on care. 920 [facility] MD of concerns for minimally responsiveness orders for CT scan. [address] guided drainage for diagnostic CT  placed back on rate no sedation given as patient is unresponsive. [address] Oracio Streeter in to discuss ct with patient. 200 Wife updated stated to call with any responsiveness. 1900 Bedside shift change report given to Philip Duncan (oncoming nurse) by Rosmery Muller  (offgoing nurse). Report included the following information Kardex, ED Summary, OR Summary, Procedure Summary and MAR.

## 2019-02-04 NOTE — PROGRESS NOTES
02/04/19 7513 02/04/19 2263 Vent Settings FIO2 (%) --  40 % Pressure Support (cm H2O) --  6 cm H2O  
PEEP/VENT (cm H2O) --  6 cm H20 ABCDEF Bundle SBT Safety Screen Passed Yes --   
SBT Trial Passed Yes -- Weaning Parameters Spontaneous Breathing Trial Complete Yes -- Resp Rate Observed 27 26 Ve 11.9 12.3  524 RSBI 67 55 Vent Method/Mode Ventilation Method --  Conventional  
Ventilator Mode --  Spontaneous

## 2019-02-04 NOTE — PROGRESS NOTES
Gastroenterology Daily Progress Note (Dr. Bolivar Trevizo) [facility] Admit Date: 2/2/2019 Subjective:   
  
Patient remains intubated and critically ill. No ongoing GI bleeding. WBC up to 38K Current Facility-Administered Medications Medication Dose Route Frequency  mupirocin (BACTROBAN) 2 % ointment   Both Nostrils BID  calcium gluconate 2 g in 0.9% sodium chloride 100 mL IVPB  2 g IntraVENous ONCE  
 metoprolol (LOPRESSOR) injection 5 mg  5 mg IntraVENous Q6H  
 octreotide (SANDOSTATIN) 500 mcg in 0.9% sodium chloride 500 mL infusion  50 mcg/hr IntraVENous CONTINUOUS  
 pantoprazole (PROTONIX) 40 mg in sodium chloride 0.9% 10 mL injection  40 mg IntraVENous Q12H  
 0.9% sodium chloride infusion  125 mL/hr IntraVENous CONTINUOUS  
 cefTRIAXone (ROCEPHIN) 1 g in 0.9% sodium chloride (MBP/ADV) 50 mL  1 g IntraVENous Q24H  
 sodium chloride (NS) flush 5-40 mL  5-40 mL IntraVENous Q8H  
 sodium chloride (NS) flush 5-40 mL  5-40 mL IntraVENous PRN  
 acetaminophen (TYLENOL) suppository 650 mg  650 mg Rectal Q4H PRN  
 hydrALAZINE (APRESOLINE) 20 mg/mL injection 10 mg  10 mg IntraVENous Q6H PRN  
 0.9% sodium chloride 3,251 mL with folic acid 1 mg, thiamine 100 mg, mvi, adult no. 4 with vit K 10 mL infusion   IntraVENous DAILY  nicotine (NICODERM CQ) 21 mg/24 hr patch 1 Patch  1 Patch TransDERmal DAILY  propofol (DIPRIVAN) infusion  0-50 mcg/kg/min IntraVENous TITRATE  phytonadione (vitamin K1) (AQUA-MEPHYTON) 10 mg in 0.9% sodium chloride 50 mL IVPB  10 mg IntraVENous DAILY  LORazepam (ATIVAN) injection 2 mg  2 mg IntraVENous Q1H PRN  
 LORazepam (ATIVAN) injection 4 mg  4 mg IntraVENous Q1H PRN Objective:  
 
Visit Vitals /70 Pulse (!) 136 Temp 98.6 °F (37 °C) Resp 29 Wt 116.1 kg (255 lb 15.3 oz) SpO2 100% BMI 40.09 kg/m² Blood pressure 121/70, pulse (!) 136, temperature 98.6 °F (37 °C), resp. rate 29, weight 116.1 kg (255 lb 15.3 oz), SpO2 100 %. 02/04 0701 - 02/04 1900 In: -  
Out: 793 [EVTGH:179] 02/02 1901 - 02/04 0700 In: 8552 [I.V.:8552] Out: 1970 [CWSKT:0394] Intake/Output Summary (Last 24 hours) at 2/4/2019 1141 Last data filed at 2/4/2019 1055 Gross per 24 hour Intake 5205.36 ml Output 1610 ml Net 3595.36 ml Physical Exam:  
 
General: sedated, intubated BM in NAD Chest:  CTA, No rhonchi, rales or rubs. Heart: tachycardic reg rhythm, S1 S2 normal 
GI: Soft, NT, ND + bowel sounds Labs:  
 
 
Recent Results (from the past 24 hour(s)) BLOOD GAS, ARTERIAL Collection Time: 02/04/19  3:06 AM  
Result Value Ref Range pH 7.37 7.35 - 7.45    
 PCO2 36 35.0 - 45.0 mmHg PO2 31 (LL) 80 - 100 mmHg O2 SAT 58 (L) 92 - 97 % BICARBONATE 20 (L) 22 - 26 mmol/L  
 BASE DEFICIT 4.4 mmol/L  
 O2 METHOD VENTILATOR    
 FIO2 40 % MODE A/C Tidal volume 500 SET RATE 12    
 EPAP/CPAP/PEEP 6.0 Sample source VENOUS    
 SITE OTHER    
 YOLANDA'S TEST YES    
METABOLIC PANEL, COMPREHENSIVE Collection Time: 02/04/19  4:17 AM  
Result Value Ref Range Sodium 145 136 - 145 mmol/L Potassium 4.6 3.5 - 5.1 mmol/L Chloride 116 (H) 97 - 108 mmol/L  
 CO2 21 21 - 32 mmol/L Anion gap 8 5 - 15 mmol/L Glucose 116 (H) 65 - 100 mg/dL BUN 48 (H) 6 - 20 MG/DL Creatinine 1.87 (H) 0.70 - 1.30 MG/DL  
 BUN/Creatinine ratio 26 (H) 12 - 20 GFR est AA 44 (L) >60 ml/min/1.73m2 GFR est non-AA 37 (L) >60 ml/min/1.73m2 Calcium 6.6 (L) 8.5 - 10.1 MG/DL Bilirubin, total 3.0 (H) 0.2 - 1.0 MG/DL  
 ALT (SGPT) 735 (H) 12 - 78 U/L  
 AST (SGOT) 1,737 (H) 15 - 37 U/L Alk. phosphatase 90 45 - 117 U/L Protein, total 6.0 (L) 6.4 - 8.2 g/dL Albumin 1.9 (L) 3.5 - 5.0 g/dL Globulin 4.1 (H) 2.0 - 4.0 g/dL A-G Ratio 0.5 (L) 1.1 - 2.2    
CBC W/O DIFF Collection Time: 02/04/19  4:17 AM  
Result Value Ref Range WBC 33.8 (H) 4.1 - 11.1 K/uL RBC 2.46 (L) 4.10 - 5.70 M/uL HGB 7.8 (L) 12.1 - 17.0 g/dL HCT 24.5 (L) 36.6 - 50.3 % MCV 99.6 (H) 80.0 - 99.0 FL  
 MCH 31.7 26.0 - 34.0 PG  
 MCHC 31.8 30.0 - 36.5 g/dL RDW 20.4 (H) 11.5 - 14.5 % PLATELET 958 226 - 656 K/uL MPV 11.9 8.9 - 12.9 FL  
 NRBC 1.1 (H) 0  WBC ABSOLUTE NRBC 0.38 (H) 0.00 - 0.01 K/uL PROTHROMBIN TIME + INR Collection Time: 02/04/19  4:17 AM  
Result Value Ref Range INR 1.7 (H) 0.9 - 1.1 Prothrombin time 16.9 (H) 9.0 - 11.1 sec Recent Labs 02/04/19 
8206 02/03/19 
0319 02/02/19 
2768  145 143  
K 4.6 4.8 3.6 * 113* 105 CO2 21 23 29 BUN 48* 36* 28* CREA 1.87* 1.49* 1.32* * 117* 148* CA 6.6* 7.1* 8.9 Recent Labs 02/04/19 
9033 02/02/19 
1981 INR 1.7* 1.7* PTP 16.9* 16.7* APTT  --  27.2 Recent Labs 02/04/19 
5966 02/03/19 
0319 02/02/19 
3061 SGOT 1,737* 263* 83* AP 90 63 76  
TP 6.0* 6.2* 7.4 ALB 1.9* 2.0* 2.3*  
GLOB 4.1* 4.2* 5.1* LPSE  --   --  206 Recent Labs 02/04/19 
8663 02/03/19 
4766 PH 7.37 7.50* PCO2 36 29* PO2 31* 95 Ref. Range 2/3/2019 03:19 2/4/2019 04:17 Bilirubin, total Latest Ref Range: 0.2 - 1.0 MG/DL 1.7 (H) 3.0 (H) Protein, total Latest Ref Range: 6.4 - 8.2 g/dL 6.2 (L) 6.0 (L) Albumin Latest Ref Range: 3.5 - 5.0 g/dL 2.0 (L) 1.9 (L) Globulin Latest Ref Range: 2.0 - 4.0 g/dL 4.2 (H) 4.1 (H) A-G Ratio Latest Ref Range: 1.1 - 2.2   0.5 (L) 0.5 (L) ALT (SGPT) Latest Ref Range: 12 - 78 U/L 144 (H) 735 (H) AST Latest Ref Range: 15 - 37 U/L 263 (H) 1,737 (H) Alk. phosphatase Latest Ref Range: 45 - 117 U/L 63 90 Ref. Range 2/2/2019 08:43 2/2/2019 13:35 2/2/2019 20:56 2/3/2019 03:19 HGB Latest Ref Range: 12.1 - 17.0 g/dL 6.2 (L) 8.6 (L) 8.5 (L) 8.0 (L) HCT Latest Ref Range: 36.6 - 50.3 % 18.5 (L) 26.0 (L) 25.3 (L) 24.6 (L) Impression: 
Acute Upper GI Hemorrhage secondary to esophageal varices s/p band ligation Leukocytosis Sharp spike in LFTs c/w shock liver Acute cholecystitis on admission noncontrast CT Plan: 
Patient's bleeding has stabilized BUT his WBC is up to 38 today and his LFTs have spiked and suspect sepsis or Shock liver. His admission CT without contrast showed possible acute cholecystitis and fluid around the pancreas but normal lipase. R/O Pancreatitis or cholecystitis 
-send for STAT CT but can't give IV contrast given low GFR 
-order surgery consult 
-check lipase 
-if CT shows cholecystitis would need IR cholecystostomy tube 
-d/w Dr. Fred Son MD 
 
2/4/2019 [address] [place] [address], [address] [id number] Loc: [phone]

## 2019-02-04 NOTE — PROGRESS NOTES
PM Note. CT scan reviewed, case d/w Dr. Josselin Avila. Patient with mass in liver on noncontrast CT Scan and AFP elevated c/w \"probable HCC\". He also has lactic acid elevation and WBC elevation but no cholecystitis on CT. Agree with R/O SBP--> Diagnostic paracentesis today. Continue with No NG for now given bleeding risk. Continue antibiotics and octreotide.
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
## 2019-02-05 NOTE — INTERDISCIPLINARY ROUNDS
Interdisciplinary team rounds were held 2/5/2019 with the following team members:Care Management, Diabetes Treatment Specialist, Nursing, Nutrition, Pharmacy and Physician. Plan of care discussed. See clinical pathway and/or care plan for interventions and desired outcomes.

## 2019-02-05 NOTE — PROGRESS NOTES
4635- Bedside and Verbal shift change report given to Dona Cristóbal Carcamo (oncoming nurse) by Felix Corley RN (offgoing nurse). Report included the following information SBAR, Kardex, Intake/Output, Recent Results, Cardiac Rhythm Sinus Tach and Alarm Parameters . 0802- Shift assessment completed; see flow sheet for details. Pt minimally responsive; does not open eyes to any stimulus; no withdrawal from either extremity; PERRLA sluggish; weak cough/gag to suction. Currently in Sinus Tach; HR~110-120s. Lungs are coarse; currently on SBT at this time; tachypnea periodically. ABD is distended; semi-soft to touch; BS hypoactive; remains NPO. Flor catheter in place; adequate UOP. Skin is warm and dry; leathery skin to BLE. No indication of pain/discomfort at this time. Pt repositioned. 0830- Dr. Tonie Garcia at the bedside; see progress note for details. 36- Dr. Miranda Ndiaye at the bedside; see progress note for details. 1035- Flexiseal inserted into rectum as ordered. 1130- Bedside and Verbal shift change report given to [address] BRAYDEN Yu (oncoming nurse) by Aleksandr Adam RN (offgoing nurse). Report included the following information SBAR, Kardex, Intake/Output, Recent Results, Cardiac Rhythm Sinus Tach and Alarm Parameters .

## 2019-02-05 NOTE — PROGRESS NOTES
2/5/2019 INTENSIVIST PROGRESS NOTE:  
 
Patient seen and evaluated, chart reviewed 60 yo male presented with UGI bleed, hematemesis EGD per GI No acute events overnight Remained intubated post procedure Now pt in CCU intubated, sedated 2/4 on vent. D/w Dr. Lupe Rubalcava- not ready to liberate. Still critically ill and at high risk of sudden decline, worsening organ dysfunction 2/5 appreciate Dr. Broderick Garcia and Lauren help. I also discussed scans with IR, Dr. Kevin Mcbride. Pt unresponsive on vent. NH3 high. No fever. WBC 29.3K. No gross bleeeding on IV Octreotide. Lactate high. Head Ct no acute changes. Rhonchi on exam. On vent. Passed SBT. Na higher ROS: unable to obtain Visit Vitals /68 (BP 1 Location: Left arm, BP Patient Position: At rest) Pulse (!) 124 Temp 99.7 °F (37.6 °C) Resp 28 Wt 120.1 kg (264 lb 12.4 oz) SpO2 99% BMI 41.47 kg/m² General: sedated, intubated on CPAP Eyes: anicteric HEENT: ETT in place Neck: FROM 
CV: RRR Lungs: clear Abd: soft : no flank pain Ext: no edema Skin: no rash Musculoskeletal: normal inspection Neuro: sedated CXR: clear Labs: 
 
Recent Labs 02/05/19 
0482 02/04/19 
1504 02/04/19 
7788 02/03/19 
0319  02/02/19 
7909 WBC 29.3*  --  33.8* 32.1*   < > 17.1* HGB 7.7* 7.9* 7.8* 8.0*   < > 6.2*  
  --  174 173   < > 175 INR 1.8*  --  1.7*  --   --  1.7* APTT  --   --   --   --   --  27.2  
 < > = values in this interval not displayed. Recent Labs 02/05/19 
6508 02/04/19 
1504 02/04/19 
0417 02/03/19 
0319 *  --  145 145  
K 3.9  --  4.6 4.8  
*  --  116* 113* CO2 23  --  21 23 *  --  116* 117* BUN 58*  --  48* 36* CREA 1.87*  --  1.87* 1.49* CA 6.8*  --  6.6* 7.1*  
MG 2.3  --   --   --   
PHOS 2.3*  --   --   --   
LAC 2.5* 2.6*  --   --   
ALB 1.9*  --  1.9* 2.0*  
SGOT 1,644*  --  1,737* 263* *  --  735* 144* LPSE  --   --  515*  --   
 
Recent Labs 02/04/19 
0306 02/03/19 
0554 02/02/19 
1335 PH 7.37 7.50* 7.40 PCO2 36 29* 36  
PO2 31* 95 157* HCO3 20* 23 22 FIO2 40 50 50 No results for input(s): CPK, CKNDX, TROIQ in the last 72 hours. No lab exists for component: CPKMB No results found for: BNPP, BNP Labs reviewed CXR reviewed- ETT in good position, no obvious infiltrates or effusions Abdominal US reviewed A/P: 
- acute respiratory failure: vent support for airway protection; not ready for liberation 
- encephalopathy- hepatic, metabolic-  
- Hypernatremnia not helping 
- UGI bleed: transfuse as needed, IV protonix, IV octreotide 
- esophageal varices: IV octreotide 
- cirrhosis by ultrasound - LFTS may have peaked; likely some ischemia per Dr. Bolivar Trevizo - WBC high- appreciate Dr. Valderrama's help 
- hypocalcemia - SYDNEY: worse - BP control 
 
- lactulose with FMS 
- adjsut IVF- reluctant to place NGT with varices - IV abx- may have to change if sodium and WBC does not fall - Sputum for culture - CPAP on vent 
- labs in AM 
- replete calcium 
- sedation  
- critically ill - Will assist on disposition planning when stable for transfer - cc time 35 minutes Ashley Thayer MD

## 2019-02-05 NOTE — PROGRESS NOTES
02/05/19 3612 02/05/19 4695 ABCDEF Bundle SBT Safety Screen Passed Yes --   
SBT Trial Passed --  Yes Weaning Parameters Resp Rate Observed 29 27 Ve 11.3 12.6  470 RSBI 72 56 Patient remains on CPAP+6, PS 6, 40%

## 2019-02-05 NOTE — PROGRESS NOTES
Problem: Pressure Injury - Risk of 
Goal: *Prevention of pressure injury Document Naresh Scale and appropriate interventions in the flowsheet. Outcome: Progressing Towards Goal 
Pressure Injury Interventions: 
Sensory Interventions: Assess changes in LOC, Assess need for specialty bed, Check visual cues for pain, Float heels, Keep linens dry and wrinkle-free, Minimize linen layers, Pad between skin to skin, Turn and reposition approx. every two hours (pillows and wedges if needed) Moisture Interventions: Absorbent underpads Activity Interventions: Assess need for specialty bed, Pressure redistribution bed/mattress(bed type) Mobility Interventions: Assess need for specialty bed, Pressure redistribution bed/mattress (bed type), Turn and reposition approx. every two hours(pillow and wedges), Float heels Nutrition Interventions: Document food/fluid/supplement intake Friction and Shear Interventions: HOB 30 degrees or less, Apply protective barrier, creams and emollients, Transferring/repositioning devices Problem: Falls - Risk of 
Goal: *Absence of Falls Document Reino Horn Fall Risk and appropriate interventions in the flowsheet. Outcome: Progressing Towards Goal 
Fall Risk Interventions: 
Mobility Interventions: Bed/chair exit alarm, Communicate number of staff needed for ambulation/transfer Mentation Interventions: Adequate sleep, hydration, pain control, Bed/chair exit alarm, Evaluate medications/consider consulting pharmacy, Update white board Medication Interventions: Bed/chair exit alarm, Evaluate medications/consider consulting pharmacy Elimination Interventions: Bed/chair exit alarm, Toileting schedule/hourly rounds History of Falls Interventions: Bed/chair exit alarm, Evaluate medications/consider consulting pharmacy

## 2019-02-05 NOTE — PROGRESS NOTES
Reason for Readmission:     Pt is a 62 yo male transferred from [facility] for evaluation/treatment of GI bleed due to esophageal varices s/p  EGD with band ligation. Pt underwent paracentisis on 2/4 which was negative for SBP. Pt has required intubation/vent support since EGD on 2/2/19. Pt had an admission at [address] [place] [address] from 1/1/19-1/3/19 for cellulitis of RLE. Pt was not seen by CM on this admission; was to follow up with Hepatology for Hep C as O/P. 
      
RRAT Score and Risk Level:     24 High Level of Readmission:    Level 1 Care Conference scheduled:   VM left for wife. Resources/supports as identified by patient/family:   Wife and dayo listed as Emergency Contacts. Top Challenges facing patient (as identified by patient/family and CM): Finances/Medication cost?      
Transportation Support system or lack thereof? Living arrangements? Self-care/ADLs/Cognition? Current Advanced Directive/Advance Care Plan:  Full Code; no ACP on file. Plan for utilizing home health:   N/A at this time Likelihood of additional readmission:   Moderate Transition of Care Plan:    Based on readmission, the patient's previous Plan of Care 
 has been evaluated and/or modified. The current Transition of Care Plan is:        
 
Pt remains intubated/sedated with sepsis and liver failure. Dr. Lindsey Nevarez spoke w/ pt's wife last night. Prognosis is guarded per MD team. 
 
Attempted to contact NN at PCP practice - currently no NN assigned to [facility]. PCP changed in pt's profile. Left VM message for wife in attempt to assess prior level of functioning and supports. CM will continue to follow. Addendum 1600: Pt's wife Brian Wasserman returned phone call. Wife seemed alarmed at having been contacted by CM even after CM explained role and reason for routine phone call.  Wife stated she was coming to the hospital to check on pt because she doesn't understand \"why he isn't waking up\". Care Management Interventions PCP Verified by CM: Yes Last Visit to PCP: 11/16/18 Palliative Care Criteria Met (RRAT>21 & CHF Dx)?: Yes 
Palliative Consult Recommended?: Yes Mode of Transport at Discharge: Other (see comment) Transition of Care Consult (CM Consult): Discharge Planning(Pt was assessed for possible dc needds.) Discharge Durable Medical Equipment: No 
Physical Therapy Consult: No 
Occupational Therapy Consult: No 
Speech Therapy Consult: No 
Current Support Network: Lives with Spouse Confirm Follow Up Transport: Other (see comment) Freedom of Choice Offered: Yes Discharge Location Discharge Placement: Unable to determine at this time Dami Crews MSW

## 2019-02-05 NOTE — PROGRESS NOTES
1900  
Report received from day nurse Olga Russo RN. Assumed care of pt. Family at bedside visiting. A/C 12, 500,40%, 6 peep. No sedation, tolerating ventilation. Responded only to suction, cough and gag present. Pupils equal and sluggish. No withdraw to pain. No movement to stimulus. Scleral edema and jaundiced.  s. BP stable. Low grade temp 99.4 F. Abdomen is soft, hypoactive BS, right paracentesis site, CDI. Pulses palpable. Skin is intact, scars on bilateral arms and thighs. RLE weeping. CVP 4-5.  
 
2115 PRN ativan given, CIWAH 8, patient has visible sweat and slight tremoring in face (RR 20s, HR 140s). [address] Repeat CIWAH 5.  
 
2330 Flor tubing changed due to leak. No change in assessment. Scheduled metoprolol given, HR 120s with small bursts to 140s. BP stable. 0020 Ativan given for CIWAH 8.  
 
0120 Repeat CIWAH 5.  
 
0300 Patient stacking breathes on the vent. RR 25-32. Hr 120s. Patient still not waking up to voice, stimulus, or pain. Seems like he is gagging on the tube at times. 0400 Reassessment. No changes. Afebrile. ST 120s-130s. BP stable. Repeat LA 2.5. Yudelka Gaitan 4 
 
0545 Informed Dr. Prosper Gordon of LA 2.5, no new orders received. 3901 SAT N/A , no sedation present. Passing SBT at this time. HR 110s Bedside and Verbal shift change report given to Dona Carcamo (oncoming nurse) by Sidra Botello RN (offgoing nurse). Report included the following information SBAR, Kardex, Intake/Output, MAR, Recent Results, Med Rec Status and Cardiac Rhythm ST.

## 2019-02-05 NOTE — PROGRESS NOTES
Hospitalist Progress Note NAME: Babar Goodman :  1955 MRN:  [id number] Assessment / Plan: 
Sepsis, POA Acute blood loss anemia, variceal - sp egd,  cont octreotide. Cont to monitor hh (stable). Gi following. Vitamin k x 3 days 
  
Cholecystitis - us abdomen nondiagnositic. Wbc remains elevated on zosyn. rpeat ct abd pending. . 
  
Acute pancreatitis by CT - cont iv fluids, lipase wnl Liver cirrhosis, portal HTN Hx of hep C, reports completed treatment 
-s/p CVC placement  
  
-afp pending to [facility] 
  
  
  
Acute renal failure 
-due pre-renal in the setting of blood loss Cont iv fluids. 
  
HTN 
-hold home meds due to risk of hemodynamic instability 
-IV prn hydralazine 
  
Leukocytosis 
- repeat ct pending to r/o acut echole/pancreatitis. Cont rocephin and add flagyl 
  
Hx of IVDU 
-check UDS 
  
Tobacco use Alcohol use  
-cessation counseled 
-nicotine patch, CIWA protocol, banana bag, fenatnyl 
  
 
 
40 or above Morbid obesity / Body mass index is 41.47 kg/m². Code status: Full Prophylaxis: SCD's Recommended Disposition: Home w/Family Subjective: Chief Complaint / Reason for Physician Visit \"intubated and sedated. \". Discussed with RN events overnight. Review of Systems: 
Symptom Y/N Comments  Symptom Y/N Comments Fever/Chills    Chest Pain Poor Appetite    Edema Cough    Abdominal Pain Sputum    Joint Pain SOB/DOSHI    Pruritis/Rash Nausea/vomit    Tolerating PT/OT Diarrhea    Tolerating Diet Constipation    Other Could NOT obtain due to:   
 
Objective: VITALS:  
Last 24hrs VS reviewed since prior progress note. Most recent are: 
Patient Vitals for the past 24 hrs: 
 Temp Pulse Resp BP SpO2  
19 1800  (!) 121 28 149/71 100 % 19 1700  (!) 125 28 154/74 99 % 19 1600 99.1 °F (37.3 °C) (!) 136 28 149/86 99 % 19 1540  (!) 137 29  98 % 19 1500  (!) 136 28 144/77 98 % 02/05/19 1400  (!) 129 23 138/71 99 % 02/05/19 1300  (!) 135 28 140/68 99 % 02/05/19 1200 98.7 °F (37.1 °C) (!) 114 25 141/71 99 % 02/05/19 1146  (!) 122  133/57   
02/05/19 1100  (!) 118 23 133/57 98 % 02/05/19 1057  (!) 118 24  98 % 02/05/19 1000  (!) 123 28 141/61 99 % 02/05/19 0900  (!) 120 26 134/64 98 % 02/05/19 0850  (!) 121 28  98 % 02/05/19 0800 99.7 °F (37.6 °C) (!) 124 28 129/68 99 % 02/05/19 0700  (!) 116 26 124/65 100 % 02/05/19 0600  (!) 124 24 135/66 100 % 02/05/19 0509  (!) 127 25 137/77 100 % 02/05/19 0500  (!) 128 17  100 % 02/05/19 0400 98.6 °F (37 °C) (!) 131 14 133/59 99 % 02/05/19 0327   25    
02/05/19 0300  (!) 114 (!) 32 123/61 99 % 02/05/19 0200  (!) 124 21 129/62 100 % 02/05/19 0100  (!) 123 25 127/62 100 % 02/05/19 0023  (!) 143 21 142/53 100 % 02/04/19 2342   26    
02/04/19 2334  (!) 122  143/58   
02/04/19 2330 98.6 °F (37 °C) (!) 125 26 143/58 100 % 02/04/19 2300  (!) 127 25 126/57 99 % 02/04/19 2200  (!) 128 23 113/60 97 % 02/04/19 2100  (!) 130 27 138/54 99 % 02/04/19 2024   25    
02/04/19 2008     99 % 02/04/19 2000  (!) 124 25 127/61 99 % 02/04/19 1900 99.4 °F (37.4 °C) (!) 124 24 129/72 100 % Intake/Output Summary (Last 24 hours) at 2/5/2019 1857 Last data filed at 2/5/2019 1800 Gross per 24 hour Intake 4062.92 ml Output 2235 ml Net 1827.92 ml PHYSICAL EXAM: 
General: Intubated and sedates EENT:  EOMI. Anicteric sclerae. MMM Resp:  CTA bilaterally, no wheezing or rales. No accessory muscle use CV:  Regular  rhythm,  No edema GI:  Soft, Non distended, Non tender.  +Bowel sounds Neurologic:  sedated Psych:   Good insight. Not anxious nor agitated Skin:  No rashes. No jaundice Reviewed most current lab test results and cultures  YES Reviewed most current radiology test results   YES Review and summation of old records today    NO 
 Reviewed patient's current orders and MAR    YES 
PMH/SH reviewed - no change compared to H&P 
________________________________________________________________________ Care Plan discussed with: 
  Comments Patient Family RN Care Manager Consultant Multidiciplinary team rounds were held today with , nursing, pharmacist and clinical coordinator. Patient's plan of care was discussed; medications were reviewed and discharge planning was addressed. ________________________________________________________________________ Total NON critical care TIME:  20   Minutes Total CRITICAL CARE TIME Spent:   Minutes non procedure based Comments >50% of visit spent in counseling and coordination of care    
________________________________________________________________________ Tray Soto MD  
 
Procedures: see electronic medical records for all procedures/Xrays and details which were not copied into this note but were reviewed prior to creation of Plan. LABS: 
I reviewed today's most current labs and imaging studies. Pertinent labs include: 
Recent Labs 02/05/19 
9212 02/04/19 
1504 02/04/19 
0417 02/03/19 
0319 WBC 29.3*  --  33.8* 32.1* HGB 7.7* 7.9* 7.8* 8.0*  
HCT 24.9* 25.3* 24.5* 24.6*  
  --  174 173 Recent Labs 02/05/19 
9910 02/04/19 
1822 02/03/19 
7142 * 145 145  
K 3.9 4.6 4.8  
* 116* 113* CO2 23 21 23 * 116* 117* BUN 58* 48* 36* CREA 1.87* 1.87* 1.49* CA 6.8* 6.6* 7.1*  
MG 2.3  --   --   
PHOS 2.3*  --   --   
ALB 1.9* 1.9* 2.0*  
TBILI 6.3* 3.0* 1.7* SGOT 1,644* 1,737* 263* * 735* 144* INR 1.8* 1.7*  --   
 
 
Signed: Tray Soto MD

## 2019-02-05 NOTE — PROGRESS NOTES
Gastroenterology Daily Progress Note (Dr. John Muhammad) [facility] Admit Date: 2/2/2019 Subjective:   
  
 
Current Facility-Administered Medications Medication Dose Route Frequency  mupirocin (BACTROBAN) 2 % ointment   Both Nostrils BID  piperacillin-tazobactam (ZOSYN) 4.5 g in 0.9% sodium chloride (MBP/ADV) 100 mL  4.5 g IntraVENous Q8H  
 influenza vaccine 2018-19 (6 mos+)(PF) (FLUARIX QUAD/FLULAVAL QUAD) injection 0.5 mL  0.5 mL IntraMUSCular PRIOR TO DISCHARGE  chlorhexidine (PERIDEX) 0.12 % mouthwash 15 mL  15 mL Oral Q12H  
 metoprolol (LOPRESSOR) injection 5 mg  5 mg IntraVENous Q6H  
 octreotide (SANDOSTATIN) 500 mcg in 0.9% sodium chloride 500 mL infusion  50 mcg/hr IntraVENous CONTINUOUS  
 pantoprazole (PROTONIX) 40 mg in sodium chloride 0.9% 10 mL injection  40 mg IntraVENous Q12H  
 0.9% sodium chloride infusion  125 mL/hr IntraVENous CONTINUOUS  
 sodium chloride (NS) flush 5-40 mL  5-40 mL IntraVENous Q8H  
 sodium chloride (NS) flush 5-40 mL  5-40 mL IntraVENous PRN  
 acetaminophen (TYLENOL) suppository 650 mg  650 mg Rectal Q4H PRN  
 hydrALAZINE (APRESOLINE) 20 mg/mL injection 10 mg  10 mg IntraVENous Q6H PRN  
 0.9% sodium chloride 9,073 mL with folic acid 1 mg, thiamine 100 mg, mvi, adult no. 4 with vit K 10 mL infusion   IntraVENous DAILY  nicotine (NICODERM CQ) 21 mg/24 hr patch 1 Patch  1 Patch TransDERmal DAILY  propofol (DIPRIVAN) infusion  0-50 mcg/kg/min IntraVENous TITRATE  LORazepam (ATIVAN) injection 2 mg  2 mg IntraVENous Q1H PRN  
 LORazepam (ATIVAN) injection 4 mg  4 mg IntraVENous Q1H PRN Objective:  
 
Visit Vitals /65 Pulse (!) 116 Temp 98.6 °F (37 °C) Resp 26 Wt 120.1 kg (264 lb 12.4 oz) SpO2 100% BMI 41.47 kg/m² Blood pressure 124/65, pulse (!) 116, temperature 98.6 °F (37 °C), resp. rate 26, weight 120.1 kg (264 lb 12.4 oz), SpO2 100 %. No intake/output data recorded. 02/03 1901 - 02/05 0700 In: 6390.8 [I.V.:6390.8] Out: 3165 [[id number]] Intake/Output Summary (Last 24 hours) at 2/5/2019 7335 Last data filed at 2/5/2019 3448 Gross per 24 hour Intake 3986.67 ml Output 2195 ml Net 1791.67 ml Physical Exam:  
 
General: Lethargic, intubated BM Chest:  Crackles at bases b/l Heart: tachycardic, reg rhythm GI: Soft, nontender, nondistended, + BS Labs:  
 
Recent Results (from the past 24 hour(s)) HGB & HCT Collection Time: 02/04/19  3:04 PM  
Result Value Ref Range HGB 7.9 (L) 12.1 - 17.0 g/dL HCT 25.3 (L) 36.6 - 50.3 % LACTIC ACID Collection Time: 02/04/19  3:04 PM  
Result Value Ref Range Lactic acid 2.6 (HH) 0.4 - 2.0 MMOL/L  
AMMONIA Collection Time: 02/04/19  3:08 PM  
Result Value Ref Range Ammonia 77 (H) <32 UMOL/L  
CELL COUNT, BODY FLUID Collection Time: 02/04/19  4:40 PM  
Result Value Ref Range BODY FLUID TYPE ABDOMINAL FLUID FLUID COLOR YELLOW    
 FLUID APPEARANCE CLEAR    
 FLUID RBC CT. 48 (H) 0 /cu mm FLUID NUCLEATED CELLS 154 /cu mm  
 FLD NEUTROPHILS 30 (A) NRRE % FLD LYMPHS 50 (A) NRRE % FLD MONO/MACROPHAGES 20 (A) NRRE % CULTURE, BODY FLUID W GRAM STAIN Collection Time: 02/04/19  4:40 PM  
Result Value Ref Range Special Requests: NO SPECIAL REQUESTS    
 GRAM STAIN OCCASIONAL WBCS SEEN    
 GRAM STAIN NO ORGANISMS SEEN Culture result: PENDING   
ALBUMIN, FLUID Collection Time: 02/04/19  4:40 PM  
Result Value Ref Range Fluid Type: ABDOMINAL FLUID Albumin, body fld. 0.1 g/dL LDH, BODY FLUID Collection Time: 02/04/19  4:40 PM  
Result Value Ref Range Fluid Type: ABDOMINAL FLUID    
 LD, body fld. 94 U/L  
GLUCOSE, FLUID Collection Time: 02/04/19  4:40 PM  
Result Value Ref Range Fluid Type: ABDOMINAL FLUID Glucose, body fld. 123 MG/DL  
BILIRUBIN, FLUID Collection Time: 02/04/19  4:40 PM  
Result Value Ref Range  Fluid Type: ABDOMINAL FLUID    
 Bilirubin,Body Fluid 0.2 MG/DL PROTEIN TOTAL, FLUID Collection Time: 02/04/19  4:40 PM  
Result Value Ref Range Fluid Type: ABDOMINAL FLUID Protein total, body fld. 0.4 g/dL SAMPLES BEING HELD Collection Time: 02/04/19  4:40 PM  
Result Value Ref Range SAMPLES BEING HELD STERILE CUP   
 COMMENT Add-on orders for these samples will be processed based on acceptable specimen integrity and analyte stability, which may vary by analyte. GLUCOSE, POC Collection Time: 02/04/19  6:58 PM  
Result Value Ref Range Glucose (POC) 120 (H) 65 - 100 mg/dL Performed by Yvan Patel PHOSPHORUS Collection Time: 02/05/19  2:52 AM  
Result Value Ref Range Phosphorus 2.3 (L) 2.6 - 4.7 MG/DL MAGNESIUM Collection Time: 02/05/19  2:52 AM  
Result Value Ref Range Magnesium 2.3 1.6 - 2.4 mg/dL METABOLIC PANEL, COMPREHENSIVE Collection Time: 02/05/19  2:52 AM  
Result Value Ref Range Sodium 148 (H) 136 - 145 mmol/L Potassium 3.9 3.5 - 5.1 mmol/L Chloride 118 (H) 97 - 108 mmol/L  
 CO2 23 21 - 32 mmol/L Anion gap 7 5 - 15 mmol/L Glucose 114 (H) 65 - 100 mg/dL BUN 58 (H) 6 - 20 MG/DL Creatinine 1.87 (H) 0.70 - 1.30 MG/DL  
 BUN/Creatinine ratio 31 (H) 12 - 20 GFR est AA 44 (L) >60 ml/min/1.73m2 GFR est non-AA 37 (L) >60 ml/min/1.73m2 Calcium 6.8 (L) 8.5 - 10.1 MG/DL Bilirubin, total 6.3 (H) 0.2 - 1.0 MG/DL  
 ALT (SGPT) 924 (H) 12 - 78 U/L  
 AST (SGOT) 1,644 (H) 15 - 37 U/L Alk. phosphatase 97 45 - 117 U/L Protein, total 6.0 (L) 6.4 - 8.2 g/dL Albumin 1.9 (L) 3.5 - 5.0 g/dL Globulin 4.1 (H) 2.0 - 4.0 g/dL A-G Ratio 0.5 (L) 1.1 - 2.2    
CBC W/O DIFF Collection Time: 02/05/19  2:52 AM  
Result Value Ref Range WBC 29.3 (H) 4.1 - 11.1 K/uL  
 RBC 2.43 (L) 4.10 - 5.70 M/uL HGB 7.7 (L) 12.1 - 17.0 g/dL HCT 24.9 (L) 36.6 - 50.3 % .5 (H) 80.0 - 99.0 FL  
 MCH 31.7 26.0 - 34.0 PG  
 MCHC 30.9 30.0 - 36.5 g/dL RDW 22.5 (H) 11.5 - 14.5 % PLATELET 601 325 - 299 K/uL MPV 12.4 8.9 - 12.9 FL  
 NRBC 0.7 (H) 0  WBC ABSOLUTE NRBC 0.20 (H) 0.00 - 0.01 K/uL PROTHROMBIN TIME + INR Collection Time: 02/05/19  2:52 AM  
Result Value Ref Range INR 1.8 (H) 0.9 - 1.1 Prothrombin time 18.2 (H) 9.0 - 11.1 sec LACTIC ACID Collection Time: 02/05/19  2:52 AM  
Result Value Ref Range Lactic acid 2.5 (HH) 0.4 - 2.0 MMOL/L Recent Labs 02/05/19 
5573 02/04/19 
8903 02/03/19 
5103 * 145 145  
K 3.9 4.6 4.8  
* 116* 113* CO2 23 21 23 BUN 58* 48* 36* CREA 1.87* 1.87* 1.49* * 116* 117* CA 6.8* 6.6* 7.1*  
MG 2.3  --   --   
PHOS 2.3*  --   --   
 
Recent Labs 02/05/19 
3317 02/04/19 
4668 02/02/19 
1083 INR 1.8* 1.7* 1.7* PTP 18.2* 16.9* 16.7* APTT  --   --  27.2 Recent Labs 02/05/19 
8460 02/04/19 
7774 02/03/19 
1694 SGOT 1,644* 1,737* 263* AP 97 90 63 TP 6.0* 6.0* 6.2* ALB 1.9* 1.9* 2.0*  
GLOB 4.1* 4.1* 4.2*  
LPSE  --  515*  --   
 
Recent Labs 02/04/19 
0873 02/03/19 
2495 PH 7.37 7.50* PCO2 36 29* PO2 31* 95 Impression: 
Hep C + EtOH cirrhosis with mass on liver on imaging Acute GI hemorrhage due bleeding esophageal varices s/p EGD with band ligation Sepsis Leukocytosis Gallstones Pancreatitis on admission Ischemic liver injury Ascites (Paracentesis neg for SBP) Plan: 
Patient's abrupt elevation in LFTs occurred after his GI bleed and likely was due to hypoperfusion / ischemic hepatopathy. This is superimposed on cirrhosis now with liver mass suspicous for [address] He remains intubated in DTs and efforts to identify intraabdominal source of sepsis are negative: Diagnostic paracentesis (neg for SBP) and repeat non contrast CT neg for cholecystitis.    
His INR is 1.8 and AFP elevated and long term prognosis more guarded in light of his sepsis and liver failure. 
-for now continue octreotide today then d/c in am 
 -continue broad spectrum abx 
-reluctance to use NG tube for now given bleeding risk but not absolute contraindication -appreciate Dr. Valderrama's assistance 
-overall prognosis guarded Rajat Diaz MD 
 
2/5/2019 [address] [facility], [address] [address] Loc: [phone]

## 2019-02-05 NOTE — PROGRESS NOTES
Surgical Progress Note Patient seen and examined by me today. Assessment     Plan Leukocytosis. - down a bit today. Possibly reactive after significant GI bleed. Possibly related to possible aspiration pneumonia. Liver failure. Right colon wall thickening 
-possibly secondary to acute ascites. Ascites - clear serous grossly. Analysis ongoing. No organisms seen on gram stain. Abdomen soft, no pain response, audible bowel sounds. Discussed situation at length with wife last evening. Liver failure in setting of acute variceal bleed, possibly c/b aspiration pneumonia. No indication for surgical intervention at this time. Will continue to monitor abdominal exam. Active Problems: 
  GI bleed (2019) Subjective: Chief Complaint: intubated Objective: VITALS:  
Last 24hrs VS reviewed since prior hospitalist progress note. Most recent are: 
Visit Vitals /61 Pulse (!) 123 Temp 99.7 °F (37.6 °C) Resp 28 Wt 120.1 kg (264 lb 12.4 oz) SpO2 99% BMI 41.47 kg/m² Temp (24hrs), Av °F (37.2 °C), Min:98.6 °F (37 °C), Max:99.7 °F (37.6 °C) Intake/Output Summary (Last 24 hours) at 2019 1016 Last data filed at 2019 0900 Gross per 24 hour Intake 4336.67 ml Output 2510 ml Net 1826.67 ml PHYSICAL EXAM: 
General appearance  Alert, cooperative, no distress, appears stated age. Lungs   Clear to auscultation bilaterally. Heart  Regular rate and rhythm. No murmur, rub or gallop. Abdomen   Completely soft. Obese but ND. Bowel sounds present. No pain response to deep palpation. No involuntary guarding. Neurologic Without overt sensory or motor deficit Lab Data Reviewed: (see below) Medications Reviewed: (see below) PMH/SH reviewed - no change compared to H&P 
________________________________________________________________________ LABS: 
Recent Labs 02/05/19 
0252 02/04/19 
1504 02/04/19 
8561 WBC 29.3*  --  33.8* HGB 7.7* 7.9* 7.8* HCT 24.9* 25.3* 24.5*  
  --  174 Recent Labs 02/05/19 
4750 02/04/19 
1596 02/03/19 
3766 * 145 145  
K 3.9 4.6 4.8  
* 116* 113* CO2 23 21 23 BUN 58* 48* 36* CREA 1.87* 1.87* 1.49* * 116* 117* CA 6.8* 6.6* 7.1*  
MG 2.3  --   --   
PHOS 2.3*  --   --   
 
Recent Labs 02/05/19 
3130 02/04/19 
9684 02/03/19 
6666 SGOT 1,644* 1,737* 263* * 735* 144* AP 97 90 63 TBILI 6.3* 3.0* 1.7* TP 6.0* 6.0* 6.2* ALB 1.9* 1.9* 2.0*  
GLOB 4.1* 4.1* 4.2*  
LPSE 210 515*  --   
 
Recent Labs 02/05/19 
4013 02/04/19 
3270 INR 1.8* 1.7* PTP 18.2* 16.9* No results for input(s): FE, TIBC, PSAT, FERR in the last 72 hours. Recent Labs 02/04/19 
1094 02/03/19 
2640 PH 7.37 7.50* PCO2 36 29* PO2 31* 95 No results for input(s): CPK, CKMB in the last 72 hours. No lab exists for component: TROPONINI Lab Results Component Value Date/Time Glucose (POC) 120 (H) 02/04/2019 06:58 PM  
 Glucose POC 71 10/10/2018 11:05 AM  
 
 
MEDICATIONS: 
Current Facility-Administered Medications Medication Dose Route Frequency  lactulose (CHRONULAC) 10 gram/15 mL solution 200 g  200 g Rectal Q6H  
 dextrose 5 % - 0.2% NaCl infusion  125 mL/hr IntraVENous CONTINUOUS  
 acetaminophen (TYLENOL) suppository 650 mg  650 mg Rectal Q8H PRN  
 mupirocin (BACTROBAN) 2 % ointment   Both Nostrils BID  piperacillin-tazobactam (ZOSYN) 4.5 g in 0.9% sodium chloride (MBP/ADV) 100 mL  4.5 g IntraVENous Q8H  
 influenza vaccine 2018-19 (6 mos+)(PF) (FLUARIX QUAD/FLULAVAL QUAD) injection 0.5 mL  0.5 mL IntraMUSCular PRIOR TO DISCHARGE  chlorhexidine (PERIDEX) 0.12 % mouthwash 15 mL  15 mL Oral Q12H  
 metoprolol (LOPRESSOR) injection 5 mg  5 mg IntraVENous Q6H  
 octreotide (SANDOSTATIN) 500 mcg in 0.9% sodium chloride 500 mL infusion 50 mcg/hr IntraVENous CONTINUOUS  
 pantoprazole (PROTONIX) 40 mg in sodium chloride 0.9% 10 mL injection  40 mg IntraVENous Q12H  
 sodium chloride (NS) flush 5-40 mL  5-40 mL IntraVENous Q8H  
 sodium chloride (NS) flush 5-40 mL  5-40 mL IntraVENous PRN  
 hydrALAZINE (APRESOLINE) 20 mg/mL injection 10 mg  10 mg IntraVENous Q6H PRN  
 0.9% sodium chloride 1,015 mL with folic acid 1 mg, thiamine 100 mg, mvi, adult no. 4 with vit K 10 mL infusion   IntraVENous DAILY  nicotine (NICODERM CQ) 21 mg/24 hr patch 1 Patch  1 Patch TransDERmal DAILY  LORazepam (ATIVAN) injection 2 mg  2 mg IntraVENous Q1H PRN  
 LORazepam (ATIVAN) injection 4 mg  4 mg IntraVENous Q1H PRN

## 2019-02-06 NOTE — PROGRESS NOTES
Problem: Pressure Injury - Risk of 
Goal: *Prevention of pressure injury Document Naresh Scale and appropriate interventions in the flowsheet. Outcome: Progressing Towards Goal 
Pressure Injury Interventions: 
Sensory Interventions: Assess changes in LOC, Assess need for specialty bed, Avoid rigorous massage over bony prominences, Check visual cues for pain, Discuss PT/OT consult with provider, Float heels, Keep linens dry and wrinkle-free, Maintain/enhance activity level, Minimize linen layers, Monitor skin under medical devices, Pad between skin to skin, Pressure redistribution bed/mattress (bed type), Turn and reposition approx. every two hours (pillows and wedges if needed), Use 30-degree side-lying position Moisture Interventions: Absorbent underpads, Apply protective barrier, creams and emollients, Assess need for specialty bed, Check for incontinence Q2 hours and as needed, Internal/External fecal devices, Contain wound drainage, Internal/External urinary devices, Limit adult briefs, Maintain skin hydration (lotion/cream), Minimize layers, Moisture barrier, Offer toileting Q_hr Activity Interventions: Assess need for specialty bed, Pressure redistribution bed/mattress(bed type), PT/OT evaluation Mobility Interventions: Assess need for specialty bed, Float heels, HOB 30 degrees or less, Pressure redistribution bed/mattress (bed type), PT/OT evaluation, Turn and reposition approx. every two hours(pillow and wedges) Nutrition Interventions: Document food/fluid/supplement intake, Discuss nutritional consult with provider, Offer support with meals,snacks and hydration Friction and Shear Interventions: Apply protective barrier, creams and emollients, Feet elevated on foot rest, Foam dressings/transparent film/skin sealants, HOB 30 degrees or less, Lift sheet, Lift team/patient mobility team, Minimize layers, Transferring/repositioning devices Problem: Falls - Risk of 
 Goal: *Absence of Falls Document Isabella Garner Fall Risk and appropriate interventions in the flowsheet. Outcome: Progressing Towards Goal 
Fall Risk Interventions: 
Mobility Interventions: Bed/chair exit alarm, Communicate number of staff needed for ambulation/transfer Mentation Interventions: Adequate sleep, hydration, pain control, Bed/chair exit alarm, Door open when patient unattended, Evaluate medications/consider consulting pharmacy, Reorient patient, Room close to nurse's station Medication Interventions: Bed/chair exit alarm, Evaluate medications/consider consulting pharmacy Elimination Interventions: Bed/chair exit alarm, Call light in reach, Toileting schedule/hourly rounds History of Falls Interventions: Bed/chair exit alarm, Consult care management for discharge planning, Door open when patient unattended, Evaluate medications/consider consulting pharmacy, Room close to nurse's station

## 2019-02-06 NOTE — PROGRESS NOTES
Surgical Progress Note Patient seen and examined by me today. Assessment     Plan Leukocytosis. - down a bit more today. Possibly reactive after significant GI bleed. Possibly related to possible aspiration pneumonia. Liver failure. Right colon wall thickening 
-possibly secondary to acute ascites. Ascites - clear serous grossly. Analysis ongoing. No organisms seen on gram stain. No growth so far Abdomen soft, no pain response, audible bowel sounds. Stooling. Liver failure in setting of acute variceal bleed, possibly c/b aspiration pneumonia. No indication for surgical intervention at this time. Will continue to monitor abdominal exam. Active Problems: 
  GI bleed (2019) Subjective: Chief Complaint: intubated Objective: VITALS:  
Last 24hrs VS reviewed since prior hospitalist progress note. Most recent are: 
Visit Vitals /74 Pulse (!) 128 Temp 98.5 °F (36.9 °C) Resp 30 Wt 125.3 kg (276 lb 3.8 oz) SpO2 98% BMI 43.26 kg/m² Temp (24hrs), Av.9 °F (37.2 °C), Min:98.2 °F (36.8 °C), Max:100.1 °F (37.8 °C) Intake/Output Summary (Last 24 hours) at 2019 1820 Last data filed at 2019 1700 Gross per 24 hour Intake 5127.5 ml Output 2735 ml Net 2392.5 ml PHYSICAL EXAM: 
General appearance  Alert, cooperative, no distress, appears stated age. Lungs   Clear to auscultation bilaterally. Heart  Regular rate and rhythm. No murmur, rub or gallop. Abdomen   Soft. Slightly more distended. Obese but ND. Bowel sounds present. No pain response to deep palpation. No involuntary guarding. Neurologic Without overt sensory or motor deficit Lab Data Reviewed: (see below) Medications Reviewed: (see below) PMH/SH reviewed - no change compared to H&P 
________________________________________________________________________ LABS: 
Recent Labs 19 3355 02/05/19 
8018 WBC 26.9* 29.3* HGB 8.2* 7.7* HCT 26.7* 24.9*  
 162 Recent Labs 02/06/19 
0770 02/05/19 
2596 02/04/19 
5626 * 148* 145  
K 3.3* 3.9 4.6 * 118* 116* CO2 22 23 21 BUN 56* 58* 48* CREA 1.87* 1.87* 1.87* * 114* 116* CA 7.6* 6.8* 6.6*  
MG 2.8* 2.3  --   
PHOS 1.9* 2.3*  --   
 
Recent Labs 02/06/19 
2316 02/05/19 
5909 02/04/19 
6987 SGOT 1,114* 1,644* 1,737* * 924* 735* AP 91 97 90 TBILI 7.7* 6.3* 3.0*  
TP 6.4 6.0* 6.0* ALB 1.8* 1.9* 1.9*  
GLOB 4.6* 4.1* 4.1*  
LPSE 307 210 515* Recent Labs 02/06/19 
7432 02/05/19 
4624 02/04/19 
5918 INR 1.7* 1.8* 1.7* PTP 17.3* 18.2* 16.9* No results for input(s): FE, TIBC, PSAT, FERR in the last 72 hours. Recent Labs 02/04/19 
3259 PH 7.37  
PCO2 36 PO2 31* No results for input(s): CPK, CKMB in the last 72 hours. No lab exists for component: TROPONINI Lab Results Component Value Date/Time Glucose (POC) 120 (H) 02/04/2019 06:58 PM  
 Glucose POC 71 10/10/2018 11:05 AM  
 
 
MEDICATIONS: 
Current Facility-Administered Medications Medication Dose Route Frequency  levoFLOXacin (LEVAQUIN) 750 mg in D5W IVPB  750 mg IntraVENous Q24H  
 metroNIDAZOLE (FLAGYL) IVPB premix 500 mg  500 mg IntraVENous Q12H  
 lactulose (CHRONULAC) 10 gram/15 mL solution 200 g  200 g Rectal Q6H  
 dextrose 5 % - 0.2% NaCl infusion  200 mL/hr IntraVENous CONTINUOUS  
 acetaminophen (TYLENOL) suppository 650 mg  650 mg Rectal Q8H PRN  
 mupirocin (BACTROBAN) 2 % ointment   Both Nostrils BID  influenza vaccine 2018-19 (6 mos+)(PF) (FLUARIX QUAD/FLULAVAL QUAD) injection 0.5 mL  0.5 mL IntraMUSCular PRIOR TO DISCHARGE  chlorhexidine (PERIDEX) 0.12 % mouthwash 15 mL  15 mL Oral Q12H  
 metoprolol (LOPRESSOR) injection 5 mg  5 mg IntraVENous Q6H  
 pantoprazole (PROTONIX) 40 mg in sodium chloride 0.9% 10 mL injection  40 mg IntraVENous Q12H  sodium chloride (NS) flush 5-40 mL  5-40 mL IntraVENous Q8H  
 sodium chloride (NS) flush 5-40 mL  5-40 mL IntraVENous PRN  
 hydrALAZINE (APRESOLINE) 20 mg/mL injection 10 mg  10 mg IntraVENous Q6H PRN  
 nicotine (NICODERM CQ) 21 mg/24 hr patch 1 Patch  1 Patch TransDERmal DAILY  LORazepam (ATIVAN) injection 2 mg  2 mg IntraVENous Q1H PRN  
 LORazepam (ATIVAN) injection 4 mg  4 mg IntraVENous Q1H PRN

## 2019-02-06 NOTE — PROGRESS NOTES
1900  Bedside report from Nba Arguello, [address] [place] [address]. Pt in bed. Unresponsive to any stimulus. 0700  Uneventful shift. Bedside report given to Eileen Contreras RN.

## 2019-02-06 NOTE — INTERDISCIPLINARY ROUNDS
Interdisciplinary team rounds were held 2/6/2019 with the following team members:Care Management, Diabetes Treatment Specialist, Nursing, Nutrition, Pharmacy and Physician. Plan of care discussed. See clinical pathway and/or care plan for interventions and desired outcomes.

## 2019-02-06 NOTE — PROGRESS NOTES
02/06/19 2035 ABCDEF Bundle SBT Safety Screen Passed Yes SBT Trial Passed Yes Weaning Parameters Spontaneous Breathing Trial Complete Yes Resp Rate Observed 26 Ve 10.7  RSBI 64 Patient remains on CPAP+6, PS 6, 40%

## 2019-02-06 NOTE — PROGRESS NOTES
Gastroenterology Daily Progress Note (Dr. Carol Parmar) [facility] Admit Date: 2/2/2019 Subjective:   
  
Patient received lactulose enemas with multiple BMs but still not arousable. Na 150 today. Creatinine stable. No melena. Current Facility-Administered Medications Medication Dose Route Frequency  potassium phosphate 30 mmol in 0.9% sodium chloride 500 mL infusion   IntraVENous ONCE  
 levoFLOXacin (LEVAQUIN) 750 mg in D5W IVPB  750 mg IntraVENous Q24H  
 metroNIDAZOLE (FLAGYL) IVPB premix 500 mg  500 mg IntraVENous Q12H  
 lactulose (CHRONULAC) 10 gram/15 mL solution 200 g  200 g Rectal Q6H  
 dextrose 5 % - 0.2% NaCl infusion  200 mL/hr IntraVENous CONTINUOUS  
 acetaminophen (TYLENOL) suppository 650 mg  650 mg Rectal Q8H PRN  
 mupirocin (BACTROBAN) 2 % ointment   Both Nostrils BID  influenza vaccine 2018-19 (6 mos+)(PF) (FLUARIX QUAD/FLULAVAL QUAD) injection 0.5 mL  0.5 mL IntraMUSCular PRIOR TO DISCHARGE  chlorhexidine (PERIDEX) 0.12 % mouthwash 15 mL  15 mL Oral Q12H  
 metoprolol (LOPRESSOR) injection 5 mg  5 mg IntraVENous Q6H  
 pantoprazole (PROTONIX) 40 mg in sodium chloride 0.9% 10 mL injection  40 mg IntraVENous Q12H  
 sodium chloride (NS) flush 5-40 mL  5-40 mL IntraVENous Q8H  
 sodium chloride (NS) flush 5-40 mL  5-40 mL IntraVENous PRN  
 hydrALAZINE (APRESOLINE) 20 mg/mL injection 10 mg  10 mg IntraVENous Q6H PRN  
 0.9% sodium chloride 5,301 mL with folic acid 1 mg, thiamine 100 mg, mvi, adult no. 4 with vit K 10 mL infusion   IntraVENous DAILY  nicotine (NICODERM CQ) 21 mg/24 hr patch 1 Patch  1 Patch TransDERmal DAILY  LORazepam (ATIVAN) injection 2 mg  2 mg IntraVENous Q1H PRN  
 LORazepam (ATIVAN) injection 4 mg  4 mg IntraVENous Q1H PRN Objective:  
 
Visit Vitals /80 Pulse (!) 116 Temp 99.7 °F (37.6 °C) Resp 23 Wt 125.3 kg (276 lb 3.8 oz) SpO2 100% BMI 43.26 kg/m² Blood pressure 142/80, pulse (!) 116, temperature 99.7 °F (37.6 °C), resp. rate 23, weight 125.3 kg (276 lb 3.8 oz), SpO2 100 %. No intake/output data recorded. 02/04 1901 - 02/06 0700 In: 6462.9 [I.V.:6462.9] Out: 3590 [NPXJW:7188; PGRLCT:570] Intake/Output Summary (Last 24 hours) at 2/6/2019 0800 Last data filed at 2/6/2019 3639 Gross per 24 hour Intake 3920.42 ml Output 2255 ml Net 1665.42 ml Physical Exam:  
 
General: Lethargic, intubated BM Chest:  Lungs cta b/l Heart: tachycardic, reg rhythm GI: Soft, nontender, slightly more distended, + BS Labs:  
 
Recent Results (from the past 24 hour(s)) CULTURE, RESPIRATORY/SPUTUM/BRONCH W GRAM STAIN Collection Time: 02/05/19 10:29 AM  
Result Value Ref Range Special Requests: NO SPECIAL REQUESTS    
 GRAM STAIN 1+ WBCS SEEN    
 GRAM STAIN FEW EPITHELIAL CELLS SEEN    
 GRAM STAIN 1+ BUDDING YEAST Culture result: PENDING   
CBC WITH AUTOMATED DIFF Collection Time: 02/06/19  3:33 AM  
Result Value Ref Range WBC 26.9 (H) 4.1 - 11.1 K/uL  
 RBC 2.55 (L) 4.10 - 5.70 M/uL HGB 8.2 (L) 12.1 - 17.0 g/dL HCT 26.7 (L) 36.6 - 50.3 % .7 (H) 80.0 - 99.0 FL  
 MCH 32.2 26.0 - 34.0 PG  
 MCHC 30.7 30.0 - 36.5 g/dL RDW 25.2 (H) 11.5 - 14.5 % PLATELET 227 697 - 512 K/uL MPV 12.3 8.9 - 12.9 FL  
 NRBC 0.6 (H) 0  WBC ABSOLUTE NRBC 0.15 (H) 0.00 - 0.01 K/uL NEUTROPHILS 80 (H) 32 - 75 % BAND NEUTROPHILS 1 % LYMPHOCYTES 7 (L) 12 - 49 % MONOCYTES 11 5 - 13 % EOSINOPHILS 0 0 - 7 % BASOPHILS 0 0 - 1 % MYELOCYTES 1 % IMMATURE GRANULOCYTES 0 0.0 - 0.5 % ABS. NEUTROPHILS 21.8 (H) 1.8 - 8.0 K/UL  
 ABS. LYMPHOCYTES 1.9 0.8 - 3.5 K/UL  
 ABS. MONOCYTES 3.0 (H) 0.0 - 1.0 K/UL  
 ABS. EOSINOPHILS 0.0 0.0 - 0.4 K/UL  
 ABS. BASOPHILS 0.0 0.0 - 0.1 K/UL  
 ABS. IMM. GRANS. 0.0 0.00 - 0.04 K/UL  
 DF MANUAL    
 RBC COMMENTS ANISOCYTOSIS 2+ 
    
 RBC COMMENTS MACROCYTOSIS 
1+ 
    
 RBC COMMENTS POLYCHROMASIA 1+ PROTHROMBIN TIME + INR Collection Time: 02/06/19  3:33 AM  
Result Value Ref Range INR 1.7 (H) 0.9 - 1.1 Prothrombin time 17.3 (H) 9.0 - 11.1 sec LIPASE Collection Time: 02/06/19  3:33 AM  
Result Value Ref Range Lipase 307 73 - 393 U/L  
AMMONIA Collection Time: 02/06/19  3:33 AM  
Result Value Ref Range Ammonia 57 (H) <32 UMOL/L  
MAGNESIUM Collection Time: 02/06/19  3:33 AM  
Result Value Ref Range Magnesium 2.8 (H) 1.6 - 2.4 mg/dL METABOLIC PANEL, COMPREHENSIVE Collection Time: 02/06/19  3:33 AM  
Result Value Ref Range Sodium 150 (H) 136 - 145 mmol/L Potassium 3.3 (L) 3.5 - 5.1 mmol/L Chloride 121 (H) 97 - 108 mmol/L  
 CO2 22 21 - 32 mmol/L Anion gap 7 5 - 15 mmol/L Glucose 135 (H) 65 - 100 mg/dL BUN 56 (H) 6 - 20 MG/DL Creatinine 1.87 (H) 0.70 - 1.30 MG/DL  
 BUN/Creatinine ratio 30 (H) 12 - 20 GFR est AA 44 (L) >60 ml/min/1.73m2 GFR est non-AA 37 (L) >60 ml/min/1.73m2 Calcium 7.6 (L) 8.5 - 10.1 MG/DL Bilirubin, total 7.7 (H) 0.2 - 1.0 MG/DL  
 ALT (SGPT) 838 (H) 12 - 78 U/L  
 AST (SGOT) 1,114 (H) 15 - 37 U/L Alk. phosphatase 91 45 - 117 U/L Protein, total 6.4 6.4 - 8.2 g/dL Albumin 1.8 (L) 3.5 - 5.0 g/dL Globulin 4.6 (H) 2.0 - 4.0 g/dL A-G Ratio 0.4 (L) 1.1 - 2.2 PHOSPHORUS Collection Time: 02/06/19  3:33 AM  
Result Value Ref Range Phosphorus 1.9 (L) 2.6 - 4.7 MG/DL Recent Labs 02/06/19 
4677 02/05/19 
0936 02/04/19 
8117 * 148* 145  
K 3.3* 3.9 4.6 * 118* 116* CO2 22 23 21 BUN 56* 58* 48* CREA 1.87* 1.87* 1.87* * 114* 116* CA 7.6* 6.8* 6.6*  
MG 2.8* 2.3  --   
PHOS 1.9* 2.3*  --   
 
Recent Labs 02/06/19 
1665 02/05/19 
9766 02/04/19 
9400 INR 1.7* 1.8* 1.7* PTP 17.3* 18.2* 16.9* Recent Labs 02/06/19 
7736 02/05/19 
4755 02/04/19 
0816 SGOT 1,114* 1,644* 1,737* AP 91 97 90  
TP 6.4 6.0* 6.0*  
 ALB 1.8* 1.9* 1.9*  
GLOB 4.6* 4.1* 4.1*  
LPSE 307 210 515* Recent Labs 02/04/19 
8446 PH 7.37  
PCO2 36 PO2 31* Impression: 
Hep C + EtOH cirrhosis with mass on liver on imaging Acute GI hemorrhage due bleeding esophageal varices s/p EGD with band ligation Sepsis Leukocytosis Lactic acidosis Gallstones Pancreatitis on admission Ischemic liver injury Ascites (Paracentesis neg for SBP) Plan: 
Mental status still not improving despite BMs and lower ammonia which may be due to hypoperfusion, acidosis and hypernatremia as well as DTs. Liver function testing slightly better with INR 1.7 and Transaminases slowly declining following ischemic liver injury. 
 
-can d/c octreotide today 
-continue PPI 
-continue broad spectrum abx: Levo/flagyl but no cholecystitis or SBP on testing 
-d/w Dr. Almaz Pérez who will treat hypernatremia today 
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 
-overall prognosis guarded Katy Fischer MD 
 
2/6/2019 [address] [place] [address], [address] [id number] Loc: [phone]

## 2019-02-06 NOTE — PROGRESS NOTES
Dr. Mesa Wrentham in to see patient. 1000 discussed patient in mornining rounds. Patient not waking up and no plans to extubate today. Respiratory in and patient placed back on rate. 1215 Lactulose enema has liquid stool in flexiseal and leaking around site, patient cleaned and bed pad changed. [address]  in per family request and talked with family. 2000 No changes. Patient unresponsive. Tolerating mechanical ventilation. 2345 No changes. Report to Jeffrey Cheung RN and Sree Evans RN.

## 2019-02-06 NOTE — PROGRESS NOTES
Pharmacy Automatic Renal Dosing Protocol - Antimicrobials Indication for Antimicrobials: Sepsis Current Regimen of Each Antimicrobial: 
Levaquin 750 mg IV q48h and Flagyl 500 mg IV q8h (Start Date 19; Day # 1) Previous Antimicrobial Therapy: 
Zosyn 4.5 g Q8 hours  (Start Date ; Day # 3) Ceftriaxone 1 gram Q24 hours (Start Date ; Days #3) Significant Cultures:  
 Urine: NG, Final 
 Blood: NGTD, Prelim : Body fluid: NG x 16 hrs,pending : Anaerobic cx: pending : Resp: budding yeast, 1+WBC, few epithelial cells: pending Radiology / Imaging results: (X-ray, CT scan or MRI):  
: CT abdomen- IMPRESSION: 
  
Sludge and tiny calcified stones again noted in the gallbladder without any 
significant gallbladder distention. Findings are nonspecific. Cirrhotic liver with nonspecific 6-7 cm lesion in segment 8 of the hepatic dome 
concerning for possible hepatocellular carcinoma. Incompletely evaluated without 
dedicated contrast liver MRI or CT Nonspecific colitis involving the ascending colon Moderate to severe gastritis Bilateral medial lower lobe lung consolidation Splenomegaly. Mild ascites.  CT Head: 
IMPRESSION: No acute findings. Paralysis, amputations, malnutrition: N/a Labs: 
Recent Labs 19 
6211 19 
6049 19 
9137 CREA 1.87* 1.87* 1.87* BUN 56* 58* 48* WBC 26.9* 29.3* 33.8* Temp (24hrs), Av.5 °F (37.5 °C), Min:98.7 °F (37.1 °C), Max:100.1 °F (37.8 °C) Creatinine Clearance (mL/min) or Dialysis: 51.4 mL/min Impression/Plan: · Adjust Levofloxacin dose to 750 mg IV q24h · Adjust Metronidazole dose to 500mg IV q12h per protocol · Antimicrobial stop date TBD Pharmacy will follow daily and adjust medications as appropriate for renal function and/or serum levels. Thank you, 
Lois Castillo Recommended duration of therapy [email or web address]/[facility]/Pharmacy/Clinical%20Companion/[email or web address]. docx Renal Dosing 
[email or web address]/[facility]/Pharmacy/Clinical%20Companion/Renal%[email or web address]. pdf

## 2019-02-06 NOTE — PROGRESS NOTES
2/6/2019 INTENSIVIST PROGRESS NOTE:  
 
Patient seen and evaluated, chart reviewed 62 yo male presented with UGI bleed, hematemesis EGD per GI No acute events overnight Remained intubated post procedure Now pt in CCU intubated, sedated 2/4 on vent. D/w Dr. Enrike Estevez- not ready to liberate. Still critically ill and at high risk of sudden decline, worsening organ dysfunction 2/5 appreciate Dr. Melodi Bosworth and Lauren help. I also discussed scans with IR, Dr. Wendie Sheffield. Pt unresponsive on vent. NH3 high. No fever. WBC 29.3K. No gross bleeeding on IV Octreotide. Lactate high. Head Ct no acute changes. Rhonchi on exam. On vent. Passed SBT. Na higher. Lipase high 2/6 still unresponsive on vent. Getting lactulose. WBC slowly coming down? NA climbing!! 
 
ROS: unable to obtain Visit Vitals /80 Pulse (!) 116 Temp 99.7 °F (37.6 °C) Resp 23 Wt 125.3 kg (276 lb 3.8 oz) SpO2 100% BMI 43.26 kg/m² General: sedated, intubated on CPAP Eyes: anicteric HEENT: ETT in place Neck: FROM 
CV: RRR Lungs: clear Abd: soft : no flank pain Ext: no edema Skin: no rash Musculoskeletal: normal inspection Neuro: sedated CXR: clear Labs: 
 
Recent Labs 02/06/19 
7134 02/05/19 
0252 02/04/19 
1504 02/04/19 
2767 WBC 26.9* 29.3*  --  33.8* HGB 8.2* 7.7* 7.9* 7.8*  162  --  174 INR 1.7* 1.8*  --  1.7* Recent Labs 02/06/19 
9936 02/05/19 
0252 02/04/19 
1504 02/04/19 
7999 * 148*  --  145  
K 3.3* 3.9  --  4.6 * 118*  --  116* CO2 22 23  --  21  
* 114*  --  116* BUN 56* 58*  --  48* CREA 1.87* 1.87*  --  1.87* CA 7.6* 6.8*  --  6.6*  
MG 2.8* 2.3  --   --   
PHOS 1.9* 2.3*  --   --   
LAC  --  2.5* 2.6*  --   
ALB 1.8* 1.9*  --  1.9*  
SGOT 1,114* 1,644*  --  1,737* * 924*  --  735* LPSE 307 210  --  515* Recent Labs 02/04/19 
3126 PH 7.37  
PCO2 36 PO2 31* HCO3 20* FIO2 40  
 
 No results for input(s): CPK, CKNDX, TROIQ in the last 72 hours. No lab exists for component: CPKMB No results found for: BNPP, BNP Labs reviewed CXR reviewed- ETT in good position, no obvious infiltrates or effusions Abdominal US reviewed CT scan reviewed A/P: 
- acute respiratory failure: vent support for airway protection; not ready for liberation 
- encephalopathy- hepatic, metabolic-  
- Hypernatremnia not helping- worse- will be hard to manage without free water 
- UGI bleed: transfuse as needed, IV protonix, IV octreotide 
- esophageal varices: IV octreotide 
- acute pancreatitis - cirrhosis by ultrasound with portal HTN 
- LFTS has peaked; likely some ischemia per Dr. Ghassan Shaw - WBC high- appreciate Dr. Valderrama's help 
- hypocalcemia - SYDNEY: worse - BP control 
 
- lactulose with FMS 
- adjsut IVF- reluctant to place NGT with varices - IV abx- change because of sodium - Sputum for culture 
- if pt does not rally, may need TPN 
- CPAP on vent 
- labs in AM 
- replete lytes - sedation  
- critically ill - Will assist on disposition planning when stable for transfer - cc time 35 minutes Vimal Lozano MD

## 2019-02-07 NOTE — PROGRESS NOTES
Problem: Pressure Injury - Risk of 
Goal: *Prevention of pressure injury Document Naresh Scale and appropriate interventions in the flowsheet. Outcome: Progressing Towards Goal 
Pressure Injury Interventions: 
Sensory Interventions: Assess changes in LOC, Check visual cues for pain, Float heels, Keep linens dry and wrinkle-free, Maintain/enhance activity level, Minimize linen layers, Monitor skin under medical devices, Pad between skin to skin, Pressure redistribution bed/mattress (bed type), Turn and reposition approx. every two hours (pillows and wedges if needed) Moisture Interventions: Absorbent underpads, Apply protective barrier, creams and emollients, Internal/External urinary devices, Maintain skin hydration (lotion/cream), Minimize layers, Moisture barrier Activity Interventions: Increase time out of bed, Pressure redistribution bed/mattress(bed type), Assess need for specialty bed Mobility Interventions: Assess need for specialty bed, Float heels, HOB 30 degrees or less, Pressure redistribution bed/mattress (bed type), Turn and reposition approx. every two hours(pillow and wedges) Nutrition Interventions: Document food/fluid/supplement intake Friction and Shear Interventions: Apply protective barrier, creams and emollients, Foam dressings/transparent film/skin sealants, HOB 30 degrees or less, Lift sheet, Lift team/patient mobility team, Minimize layers, Transferring/repositioning devices Problem: Falls - Risk of 
Goal: *Absence of Falls Document Esha Fearing Fall Risk and appropriate interventions in the flowsheet. Outcome: Progressing Towards Goal 
Fall Risk Interventions: 
Mobility Interventions: Bed/chair exit alarm, Strengthening exercises (ROM-active/passive) Mentation Interventions: Adequate sleep, hydration, pain control, Bed/chair exit alarm, Door open when patient unattended, Evaluate medications/consider consulting pharmacy, Room close to nurse's station, Toileting rounds, Update white board Medication Interventions: Bed/chair exit alarm, Evaluate medications/consider consulting pharmacy Elimination Interventions: Bed/chair exit alarm, Call light in reach, Toileting schedule/hourly rounds History of Falls Interventions: Bed/chair exit alarm, Evaluate medications/consider consulting pharmacy, Door open when patient unattended, Room close to nurse's station

## 2019-02-07 NOTE — PROGRESS NOTES
0700- Shift repot given to Mozella Sandifer from [place], [address] [address]- Pt is minimally responsive, does not respond to pain in all extremities, pupils reactive & sluggish, does cough with suction. Pt remains intubated, no sedation. flexaseal in place draining large amt brown liq stool. No blood in stool. Pt receiving 1/4 NS with D5 @200ml/hr. No signs of pain. 1130- NG inserted, KUB ordered to check placement. 1330- KUB recommended to advance tube. Advanced from 65cm to 75cm, repeat KUB ordered. 1518- \"Gastric decompression tube terminates in the body of the stomach. \" Will use NG 
 
1630- Neuro consult ordered. TF started Tube feeds TwoCal HN 
 10 Advance Yes Frequency Q 8 hours Goal (mL/hr) 50 Free Water Yes Volume (mL) 150 Frequency Q 6 hours 1945- Shift report given to Preeti [facility]

## 2019-02-07 NOTE — PROGRESS NOTES
Nutrition Assessment: 
 
RECOMMENDATIONS:  
Initiate TF via NGT: 
 Start TwoCal HN @ 10mL/h, advance rate 10mL q 8h as tolerated to Goal Rate of 50mL/h + 150mL H2O flush q 6h (provides 2400kcals/100gPro/1440mL) Pt at risk for refeeding syndrome, would monitor electrolytes daily and replete prn DIETITIANS INTERVENTIONS/PLAN:  
Initiate TF Monitor electrolytes ASSESSMENT:  
Pt admitted with GI bleed + cholecystitis. PMH: HTN, cirrhosis, hepatitis C. Chart reviewed, case discussed during CCU rounds. Pt intubated and unresponsive. He is s/p EGD over the weekend, esophageal varices were found. NGT placed today, per discussion with Dr. Noam kwong to start TF. No family in the room. [place] wasting noted, wt hx likely unreliable 2' ascites. Unable to ascertain malnutrition dx at this time. He is at risk for refeeding syndrome given several days NPO and cirrhosis hx. TF at goal rate will meet 94% kcal and 100% protein needs. flexiseal in place for diarrhea, 2' lactulose. NH3 is better. SUBJECTIVE/OBJECTIVE:  
Pt intubated and unresponsive Diet Order: NPO 
% Eaten:  No data found. Pertinent Medications:albumin, lactulose, levaquin, flagyl, protonix; IVF(D5, [email or web address]). Chemistries: 
Lab Results Component Value Date/Time Sodium 149 (H) 02/07/2019 03:11 AM  
 Potassium 3.6 02/07/2019 03:11 AM  
 Chloride 120 (H) 02/07/2019 03:11 AM  
 CO2 20 (L) 02/07/2019 03:11 AM  
 Anion gap 9 02/07/2019 03:11 AM  
 Glucose 136 (H) 02/07/2019 03:11 AM  
 BUN 66 (H) 02/07/2019 03:11 AM  
 Creatinine 2.20 (H) 02/07/2019 03:11 AM  
 BUN/Creatinine ratio 30 (H) 02/07/2019 03:11 AM  
 GFR est AA 37 (L) 02/07/2019 03:11 AM  
 GFR est non-AA 30 (L) 02/07/2019 03:11 AM  
 Calcium 7.5 (L) 02/07/2019 03:11 AM  
 AST (SGOT) 1,114 (H) 02/06/2019 03:33 AM  
 Alk.  phosphatase 91 02/06/2019 03:33 AM  
 Protein, total 6.4 02/06/2019 03:33 AM  
 Albumin 1.8 (L) 02/06/2019 03:33 AM  
 Globulin 4.6 (H) 02/06/2019 03:33 AM  
 A-G Ratio 0.4 (L) 02/06/2019 03:33 AM  
 ALT (SGPT) 838 (H) 02/06/2019 03:33 AM  
  
Anthropometrics: Height:   Weight: 125.3 kg (276 lb 3.8 oz)  [x]bed scale (2/6)   []stated   []unknown IBW (%IBW):   ( ) UBW (%UBW):   (  %) BMI: Body mass index is 43.26 kg/m². This BMI is indicative of: 
[]Underweight   []Normal   []Overweight   [] Obesity   [x] Extreme Obesity (BMI>40) Estimated Nutrition Needs (Based on): 0586 Kcals/day([facility] 2006)) , 101 g(1.5gPro/kg IBW) Protein Carbohydrate: At Least 130 g/day  Fluids: 1500 mL/day or per MD 
 
Last BM: flexiseal-daily OP   []Active     []Hyperactive  [x]Hypoactive       [] Absent   BS Skin:    [x] Intact   [] Incision  [] Breakdown   [] DTI   [] Tears/Excoriation/Abrasion  [x]Edema(+2-generalized, all extremities)  [] Other: Wt Readings from Last 30 Encounters:  
02/06/19 125.3 kg (276 lb 3.8 oz) 02/02/19 95.3 kg (210 lb) 01/02/19 122.5 kg (270 lb)  
11/16/18 125.6 kg (277 lb) 10/10/18 126.8 kg (279 lb 9.6 oz) 09/14/18 113.4 kg (250 lb)  
06/20/18 128.6 kg (283 lb 8.2 oz) 04/21/18 137 kg (302 lb)  
01/24/18 137.9 kg (304 lb)  
11/30/17 139.3 kg (307 lb)  
09/26/17 134.4 kg (296 lb 6.4 oz) 09/07/17 133.8 kg (295 lb) 08/31/17 137.2 kg (302 lb 6.4 oz) 06/01/17 140.5 kg (309 lb 11.2 oz) 04/18/17 146.9 kg (323 lb 12.8 oz) 03/16/17 143 kg (315 lb 4.8 oz) 03/11/17 129.3 kg (285 lb) 02/08/17 130.2 kg (287 lb) 12/13/16 144.7 kg (319 lb)  
11/17/16 146.6 kg (323 lb 3.2 oz)  
11/16/16 147.3 kg (324 lb 12.8 oz) 08/11/16 148.3 kg (327 lb)  
07/18/16 136.1 kg (300 lb) 04/11/16 149.7 kg (330 lb)  
03/28/16 149.4 kg (329 lb 6.4 oz) 02/19/16 138.3 kg (305 lb) 01/04/16 148.8 kg (328 lb) 12/04/15 146.4 kg (322 lb 11.2 oz) 10/26/15 148.3 kg (326 lb 14.4 oz)  
10/01/15 143.8 kg (317 lb) NUTRITION DIAGNOSES:  
Problem:  Inadequate protein-energy intake Etiology: related to pt NPO 2' vent Signs/Symptoms: as evidenced by NPO meets 0% kcal and protein needs. NUTRITION INTERVENTIONS: 
  Enteral/Parenteral Nutrition: Initiate enteral nutrition GOAL:  
Pt will tolerate TF initiation with residuals <250mL and electrolytes WNL in 2-4 days. Cultural, Confucianist, or Ethnic Dietary Needs: None LEARNING NEEDS (Diet, Food/Nutrient-Drug Interaction):  
 [x] None Identified 
 [] Identified and Education Provided/Documented 
 [] Identified and Pt declined/was not appropriate [x] Interdisciplinary Care Plan Reviewed/Documented  
 [x] Participated in Discharge Planning: Unable to determine  
 [x] Interdisciplinary Rounds NUTRITION RISK:  
 [x] High              [] Moderate           []  Low  []  Minimal/Uncompromised PT SEEN FOR:  
 [x]  MD Consult: []Calorie Count []Diabetic Diet Education []Diet Education []Electrolyte Management []General Nutrition Management and Supplements [x]Management of Tube Feeding []TPN Recommendations []  RN Referral:  []MST score >=2 
   []Enteral/Parenteral Nutrition PTA []Pregnant: Gestational DM or Multigestation  
[]  Low BMI []  Re-Screen  
[]  LOS []  NPO/clears x 5 days []  New TF/TPN Giancarlo Zaragoza RD, [facility] Pager [phone] Weekend Pager [phone]

## 2019-02-07 NOTE — PROGRESS NOTES
Hakan Young, CL[facility] notified of patient's readmission to [facility]/ Cecily Lopez of Care Management

## 2019-02-07 NOTE — PROGRESS NOTES
Surgical Progress Note Patient seen and examined by me today. Assessment     Plan Leukocytosis. - coming down gradually. - Possibly reactive after significant GI bleed. - Possibly related to possible aspiration pneumonia. Liver failure. Right colon wall thickening 
-possibly secondary to acute ascites. -lactate WNL. Ascites - clear serous grossly. Analysis ongoing. No organisms seen on gram stain. No growth after 3 days. Abdomen soft, no pain response, audible bowel sounds. Stooling. Liver failure in setting of acute variceal bleed, possibly c/b aspiration pneumonia. No indication for surgical intervention. Benign abd exam.  +GI fxn. Will sign off. Please call with questions or concerns. Thanks. Active Problems: 
  GI bleed (2019) Subjective: Chief Complaint: intubated Objective: VITALS:  
Last 24hrs VS reviewed since prior hospitalist progress note. Most recent are: 
Visit Vitals /75 Pulse (!) 120 Temp 98.6 °F (37 °C) Resp 24 Wt 125.3 kg (276 lb 3.8 oz) SpO2 99% BMI 43.26 kg/m² Temp (24hrs), Av.3 °F (37.4 °C), Min:98.5 °F (36.9 °C), Max:100.6 °F (38.1 °C) Intake/Output Summary (Last 24 hours) at 2019 1429 Last data filed at 2019 1200 Gross per 24 hour Intake 4763.33 ml Output 3745 ml Net 1018.33 ml PHYSICAL EXAM: 
General appearance  Alert, cooperative, no distress, appears stated age. Lungs   Clear to auscultation bilaterally. Heart  Regular rate and rhythm. No murmur, rub or gallop. Abdomen   Soft. Obese but ND. Bowel sounds present. No pain response to deep palpation (as opposed to with sternal rub +grimace). No involuntary guarding. Neurologic Without overt sensory or motor deficit Lab Data Reviewed: (see below) Medications Reviewed: (see below) PMH/SH reviewed - no change compared to H&P 
 ________________________________________________________________________ LABS: 
Recent Labs 02/07/19 
0310 02/06/19 
6819 WBC 23.1* 26.9* HGB 7.8* 8.2* HCT 25.3* 26.7*  
 204 Recent Labs 02/07/19 
7544 02/06/19 
3323 02/05/19 
9478 * 150* 148* K 3.6 3.3* 3.9 * 121* 118* CO2 20* 22 23 BUN 66* 56* 58* CREA 2.20* 1.87* 1.87* * 135* 114* CA 7.5* 7.6* 6.8*  
MG  --  2.8* 2.3 PHOS 2.6 1.9* 2.3* Recent Labs 02/06/19 
1445 02/05/19 
4682 SGOT 1,114* 1,644* * 924* AP 91 97 TBILI 7.7* 6.3* TP 6.4 6.0* ALB 1.8* 1.9*  
GLOB 4.6* 4.1*  
LPSE 307 210 Recent Labs 02/06/19 
6384 02/05/19 
5487 INR 1.7* 1.8* PTP 17.3* 18.2* No results for input(s): FE, TIBC, PSAT, FERR in the last 72 hours. No results for input(s): PH, PCO2, PO2 in the last 72 hours. No results for input(s): CPK, CKMB in the last 72 hours. No lab exists for component: TROPONINI Lab Results Component Value Date/Time Glucose (POC) 120 (H) 02/04/2019 06:58 PM  
 Glucose POC 71 10/10/2018 11:05 AM  
 
 
MEDICATIONS: 
Current Facility-Administered Medications Medication Dose Route Frequency  albumin human 25% (BUMINATE) solution 25 g  25 g IntraVENous Q6H  
 lactulose (CHRONULAC) solution 30 g  45 mL Per NG tube TID  levoFLOXacin (LEVAQUIN) 750 mg in D5W IVPB  750 mg IntraVENous Q24H  
 metroNIDAZOLE (FLAGYL) IVPB premix 500 mg  500 mg IntraVENous Q12H  
 dextrose 5 % - 0.2% NaCl infusion  100 mL/hr IntraVENous CONTINUOUS  
 acetaminophen (TYLENOL) suppository 650 mg  650 mg Rectal Q8H PRN  
 mupirocin (BACTROBAN) 2 % ointment   Both Nostrils BID  influenza vaccine 2018-19 (6 mos+)(PF) (FLUARIX QUAD/FLULAVAL QUAD) injection 0.5 mL  0.5 mL IntraMUSCular PRIOR TO DISCHARGE  chlorhexidine (PERIDEX) 0.12 % mouthwash 15 mL  15 mL Oral Q12H  
 metoprolol (LOPRESSOR) injection 5 mg  5 mg IntraVENous Q6H  
  pantoprazole (PROTONIX) 40 mg in sodium chloride 0.9% 10 mL injection  40 mg IntraVENous Q12H  
 sodium chloride (NS) flush 5-40 mL  5-40 mL IntraVENous Q8H  
 sodium chloride (NS) flush 5-40 mL  5-40 mL IntraVENous PRN  
 hydrALAZINE (APRESOLINE) 20 mg/mL injection 10 mg  10 mg IntraVENous Q6H PRN  
 nicotine (NICODERM CQ) 21 mg/24 hr patch 1 Patch  1 Patch TransDERmal DAILY  LORazepam (ATIVAN) injection 2 mg  2 mg IntraVENous Q1H PRN  
 LORazepam (ATIVAN) injection 4 mg  4 mg IntraVENous Q1H PRN

## 2019-02-07 NOTE — PROGRESS NOTES
Spiritual Care Assessment/Progress Note [address] 
 
 
NAME: Shalom Barlow      MRN: [id number] AGE: 61 y.o. SEX: male Lutheran Affiliation: Favian Thomas  
Language: [place] 2/7/2019     Total Time (in minutes): 7 Spiritual Assessment begun in [facility] 2 CRITICAL CARE 3 through conversation with: 
  
    []Patient        [] Family    [] Friend(s) Reason for Consult: Initial/Spiritual assessment, critical care Spiritual beliefs: (Please include comment if needed) 
   [] Identifies with a constance tradition:     
   [] Supported by a constance community:        
   [] Claims no spiritual orientation:       
   [] Seeking spiritual identity:            
   [] Adheres to an individual form of spirituality:       
   [x] Not able to assess:                   
 
    
Identified resources for coping:  
   [] Prayer                           
   [] Music                  [] Guided Imagery 
   [] Family/friends                 [] Pet visits [] Devotional reading                         [x] Unknown 
   [] Other:                                          
 
 
Interventions offered during this visit: (See comments for more details) Patient Interventions: Initial/Spiritual assessment, Critical care Plan of Care: 
 
 [] Support spiritual and/or cultural needs  
 [] Support AMD and/or advance care planning process    
 [] Support grieving process 
 [] Coordinate Rites and/or Rituals  
 [] Coordination with community clergy [] No spiritual needs identified at this time 
 [] Detailed Plan of Care below (See Comments)  [] Make referral to Music Therapy 
[] Make referral to Pet Therapy    
[] Make referral to Addiction services 
[] Make referral to [facility] 
[] Make referral to Spiritual Care Partner 
[] No future visits requested       
[x] Follow up visits as needed Comments: Mr. Macy Moy is unable to participate in an assessment and no family was present. Spoke with nurse. Family has not been in today. Pastoral Care continues to be available. Please page if needed. Sena Cushing, Masha Rey., [facility], [facility]

## 2019-02-07 NOTE — PROGRESS NOTES
Critical care interdisciplinary rounds held on 02/07/2019. Following members present, Pharmacy, Diabetes Treatment, Case Management,Clinical Lead, Respiratory Therapy,Palliative Care, Nutrition. Led by Nursing and MD. Plan of care discussed. See clinical pathway for plan of care and interventions and desired outcomes.

## 2019-02-07 NOTE — PROGRESS NOTES
9798 Bedside & verbal report given to Jos Sales RN from [facility] Patient moved from [address] [place] [address] to [address] 3,  Rm 2514; shift assessment complete - see flowsheet for specific details; patient is unresponsive & does not withdraw from pain 0310 Labs drawn & sent  
 
0400 Reassessment - patient febrile, cooling measures taken; will apply cooling blanket if need be 
 
0624 Patient's linens, gown, & bed pad changed 8418 Patient's temperature 100.2F axillary 
 
0700 Bedside and Verbal shift change report given to Jerrod Gramajo RN (oncoming nurse) by Jos Sales RN (offgoing nurse). Report included the following information SBAR, Kardex, ED Summary, Procedure Summary, Intake/Output, MAR, Recent Results and Cardiac Rhythm ST.

## 2019-02-07 NOTE — PROGRESS NOTES
2/7/2019 INTENSIVIST PROGRESS NOTE:  
 
Patient seen and evaluated, chart reviewed 62 yo male presented with UGI bleed, hematemesis EGD per GI No acute events overnight Remained intubated post procedure Now pt in CCU intubated, sedated No acute events overnight Still unresponsive on vent. Getting lactulose. WBC slowly coming down? ROS: unable to obtain Visit Vitals /70 Pulse (!) 107 Temp 99.2 °F (37.3 °C) Resp 28 Wt 125.3 kg (276 lb 3.8 oz) SpO2 100% BMI 43.26 kg/m² General: sedated, intubated on CPAP Eyes: anicteric HEENT: ETT in place Neck: FROM 
CV: RRR Lungs: clear Abd: soft : no flank pain Ext: no edema Skin: no rash Musculoskeletal: normal inspection Neuro: sedated CXR: bibasilar atx Labs: 
 
Recent Labs 02/07/19 
0310 02/06/19 
0973 02/05/19 
6552 WBC 23.1* 26.9* 29.3* HGB 7.8* 8.2* 7.7*  204 162 INR  --  1.7* 1.8* Recent Labs 02/07/19 
7818 02/06/19 
0809 02/06/19 
0538 02/05/19 
0252 02/04/19 
1504 *  --  150* 148*  --   
K 3.6  --  3.3* 3.9  --   
*  --  121* 118*  --   
CO2 20*  --  22 23  --   
*  --  135* 114*  --   
BUN 66*  --  56* 58*  --   
CREA 2.20*  --  1.87* 1.87*  --   
CA 7.5*  --  7.6* 6.8*  --   
MG  --   --  2.8* 2.3  --   
PHOS 2.6  --  1.9* 2.3*  --   
LAC  --  1.7  --  2.5* 2.6* ALB  --   --  1.8* 1.9*  --   
SGOT  --   --  1,114* 1,644*  --   
ALT  --   --  838* 924*  --   
LPSE  --   --  307 210  -- No results for input(s): PH, PCO2, PO2, HCO3, FIO2 in the last 72 hours. No results for input(s): CPK, CKNDX, TROIQ in the last 72 hours. No lab exists for component: CPKMB No results found for: BNPP, BNP Labs reviewed CXR reviewed- ETT in good position, bibasilar atx A/P: 
- acute respiratory failure: vent support for airway protection; not ready for liberation 
- encephalopathy- hepatic, metabolic-  
 - Hypernatremnia not helping- worse- will be hard to manage without free water 
- UGI bleed: transfuse as needed, IV protonix, IV octreotide 
- esophageal varices: IV octreotide 
- acute pancreatitis - cirrhosis by ultrasound with portal HTN 
- LFTS has peaked; likely some ischemia per Dr. Lisa Ramirez - WBC high- appreciate Dr. Valderrama's help 
- hypocalcemia - SYDNEY: worse - BP control 
- lactulose with FMS 
- adjsut IVF- reluctant to place NGT with varices - IV abx- change because of sodium - Sputum for culture - TPN 
- replete lytes as needed 
- sedation  
- critically ill - Will assist on disposition planning when stable for transfer Elie Kunz MD

## 2019-02-07 NOTE — PROGRESS NOTES
Hospitalist Progress Note NAME: Abdullahi Clark :  1955 MRN:  [id number] Assessment / Plan: 
Sepsis, POA 
-acute respiratory failure: vent support  
 
-Acute blood loss anemia, variceal - sp egd,  cont octreotide. Cont to monitor hh (stable). Gi following. Vitamin k x 3 days 
  
 
- encephalopathy- hepatic, metabolic-  
 
Acute pancreatitis by CT - cont iv fluids, lipase wnl 
 
- Hypernatremnia not helping- worse- 
 
Liver cirrhosis, portal HTN Hx of hep C, reports completed treatment 
-s/p CVC placement  
 
Cholecystitis - us abdomen nondiagnositic. Wbc remains elevated on zosyn.   
  
  
Acute renal failure 
-due pre-renal in the setting of blood loss Cont iv fluids. 
  
HTN 
-hold home meds due to risk of hemodynamic instability 
-IV prn hydralazine 
  
Leukocytosis 
- repeat ct pending to r/o acut echole/pancreatitis. Cont rocephin and add flagyl 
  
Hx of IVDU 
-check UDS 
  
Tobacco use Alcohol use  
-cessation counseled 
-nicotine patch, CIWA protocol, banana bag, fenatnyl 
  
 
 
40 or above Morbid obesity / Body mass index is 43.26 kg/m². Code status: Full Prophylaxis: SCD's Recommended Disposition: Home w/Family Subjective: Chief Complaint / Reason for Physician Visit \"intubated and sedated. \". Discussed with RN events overnight. Review of Systems: 
Symptom Y/N Comments  Symptom Y/N Comments Fever/Chills    Chest Pain Poor Appetite    Edema Cough    Abdominal Pain Sputum    Joint Pain SOB/DOSHI    Pruritis/Rash Nausea/vomit    Tolerating PT/OT Diarrhea    Tolerating Diet Constipation    Other Could NOT obtain due to:   
 
Objective: VITALS:  
Last 24hrs VS reviewed since prior progress note. Most recent are: 
Patient Vitals for the past 24 hrs: 
 Temp Pulse Resp BP SpO2  
19 1900  (!) 128 (!) 31 149/77 97 % 19 1800  (!) 125 30 142/77 98 % 19 1717  (!) 128    02/06/19 1700  (!) 123 30 153/74 98 % 02/06/19 1600 98.5 °F (36.9 °C) (!) 128 30 151/84 98 % 02/06/19 1500  (!) 125 27 133/78 100 % 02/06/19 1400  (!) 122 28 143/73 100 % 02/06/19 1300  (!) 124 28 138/82 100 % 02/06/19 1200 98.2 °F (36.8 °C) (!) 117 29 144/82 100 % 02/06/19 1129  (!) 124     
02/06/19 1100  (!) 127 28 134/90 100 % 02/06/19 1000  (!) 129 28 (!) 144/94 100 % 02/06/19 0921  (!) 128 (!) 32  100 % 02/06/19 0900  (!) 121 28 (!) 155/92 100 % 02/06/19 0800 98.2 °F (36.8 °C) (!) 101 24 146/86 100 % 02/06/19 0700  100 23 138/72 100 % 02/06/19 0500  (!) 116 23 142/80 100 % 02/06/19 0400  (!) 127 30 152/80 100 % 02/06/19 0356   29    
02/06/19 0344  (!) 123 27 (!) 159/94 100 % 02/06/19 0200  (!) 125 25 (!) 159/99 100 % 02/06/19 0100  (!) 119 30 162/88 100 % 02/06/19 0000  (!) 112 30 (!) 144/97 100 % 02/05/19 2338  (!) 130  144/78   
02/05/19 2313   29    
02/05/19 2300 99.7 °F (37.6 °C) (!) 122 (!) 31 144/78 97 % 02/05/19 2200  (!) 132 27 147/89 98 % 02/05/19 2100  (!) 132 29 149/74 92 % 02/05/19 2023   29    
02/05/19 2000  (!) 133 27 133/77 92 % Intake/Output Summary (Last 24 hours) at 2/6/2019 1945 Last data filed at 2/6/2019 1900 Gross per 24 hour Intake 4952.5 ml Output 2785 ml Net 2167.5 ml PHYSICAL EXAM: 
General: Intubated and sedates EENT:  EOMI. Anicteric sclerae. MMM Resp:  CTA bilaterally, no wheezing or rales. No accessory muscle use CV:  Regular  rhythm,  No edema GI:  Soft, Non distended, Non tender.  +Bowel sounds Neurologic:  sedated Psych:   Good insight. Not anxious nor agitated Skin:  No rashes. No jaundice Reviewed most current lab test results and cultures  YES Reviewed most current radiology test results   YES Review and summation of old records today    NO Reviewed patient's current orders and MAR    YES 
PMH/SH reviewed - no change compared to H&P 
 ________________________________________________________________________ Care Plan discussed with: 
  Comments Patient Family RN Care Manager Consultant Multidiciplinary team rounds were held today with , nursing, pharmacist and clinical coordinator. Patient's plan of care was discussed; medications were reviewed and discharge planning was addressed. ________________________________________________________________________ Total NON critical care TIME:  20   Minutes Total CRITICAL CARE TIME Spent:   Minutes non procedure based Comments >50% of visit spent in counseling and coordination of care    
________________________________________________________________________ Loni Denson MD  
 
Procedures: see electronic medical records for all procedures/Xrays and details which were not copied into this note but were reviewed prior to creation of Plan. LABS: 
I reviewed today's most current labs and imaging studies. Pertinent labs include: 
Recent Labs 02/06/19 
0615 02/05/19 
0252 02/04/19 
1504 02/04/19 
2525 WBC 26.9* 29.3*  --  33.8* HGB 8.2* 7.7* 7.9* 7.8* HCT 26.7* 24.9* 25.3* 24.5*  
 162  --  174 Recent Labs 02/06/19 
5220 02/05/19 
3284 02/04/19 
4714 * 148* 145  
K 3.3* 3.9 4.6 * 118* 116* CO2 22 23 21 * 114* 116* BUN 56* 58* 48* CREA 1.87* 1.87* 1.87* CA 7.6* 6.8* 6.6*  
MG 2.8* 2.3  --   
PHOS 1.9* 2.3*  --   
ALB 1.8* 1.9* 1.9* TBILI 7.7* 6.3* 3.0*  
SGOT 1,114* 1,644* 1,737* * 924* 735* INR 1.7* 1.8* 1.7* Signed: Loni Denson MD

## 2019-02-07 NOTE — PROGRESS NOTES
Gastroenterology Daily Progress Note (Dr. Milagro Head) [facility] Admit Date: 2/2/2019 Subjective:   
  
Patient remains unresponsive to commands. Intubated and not sedated on vent. Flor and rectal tube in place. Still receiving lactulose enemas. No black stools. Current Facility-Administered Medications Medication Dose Route Frequency  levoFLOXacin (LEVAQUIN) 750 mg in D5W IVPB  750 mg IntraVENous Q24H  
 metroNIDAZOLE (FLAGYL) IVPB premix 500 mg  500 mg IntraVENous Q12H  
 lactulose (CHRONULAC) 10 gram/15 mL solution 200 g  200 g Rectal Q6H  
 dextrose 5 % - 0.2% NaCl infusion  200 mL/hr IntraVENous CONTINUOUS  
 acetaminophen (TYLENOL) suppository 650 mg  650 mg Rectal Q8H PRN  
 mupirocin (BACTROBAN) 2 % ointment   Both Nostrils BID  influenza vaccine 2018-19 (6 mos+)(PF) (FLUARIX QUAD/FLULAVAL QUAD) injection 0.5 mL  0.5 mL IntraMUSCular PRIOR TO DISCHARGE  chlorhexidine (PERIDEX) 0.12 % mouthwash 15 mL  15 mL Oral Q12H  
 metoprolol (LOPRESSOR) injection 5 mg  5 mg IntraVENous Q6H  
 pantoprazole (PROTONIX) 40 mg in sodium chloride 0.9% 10 mL injection  40 mg IntraVENous Q12H  
 sodium chloride (NS) flush 5-40 mL  5-40 mL IntraVENous Q8H  
 sodium chloride (NS) flush 5-40 mL  5-40 mL IntraVENous PRN  
 hydrALAZINE (APRESOLINE) 20 mg/mL injection 10 mg  10 mg IntraVENous Q6H PRN  
 nicotine (NICODERM CQ) 21 mg/24 hr patch 1 Patch  1 Patch TransDERmal DAILY  LORazepam (ATIVAN) injection 2 mg  2 mg IntraVENous Q1H PRN  
 LORazepam (ATIVAN) injection 4 mg  4 mg IntraVENous Q1H PRN Objective:  
 
Visit Vitals /70 Pulse (!) 107 Temp 99.2 °F (37.3 °C) Resp 28 Wt 125.3 kg (276 lb 3.8 oz) SpO2 100% BMI 43.26 kg/m² Blood pressure 139/70, pulse (!) 107, temperature 99.2 °F (37.3 °C), resp. rate 28, weight 125.3 kg (276 lb 3.8 oz), SpO2 100 %. No intake/output data recorded. 02/05 1901 - 02/07 0700 In: 7652.5 [I.V.:7652.5] Out: 0425 [Urine:2403; EGZXOC:6795] Intake/Output Summary (Last 24 hours) at 2/7/2019 0831 Last data filed at 2/7/2019 0700 Gross per 24 hour Intake 5127.5 ml Output 4220 ml Net 907.5 ml Physical Exam:  
 
General: Lethargic, intubated BM Chest:  Lungs cta b/l Heart: tachycardic, reg rhythm GI: Soft, nontender, mild distention, + BS Labs:  
 
Recent Results (from the past 24 hour(s)) CBC WITH AUTOMATED DIFF Collection Time: 02/07/19  3:10 AM  
Result Value Ref Range WBC 23.1 (H) 4.1 - 11.1 K/uL  
 RBC 2.43 (L) 4.10 - 5.70 M/uL HGB 7.8 (L) 12.1 - 17.0 g/dL HCT 25.3 (L) 36.6 - 50.3 % .1 (H) 80.0 - 99.0 FL  
 MCH 32.1 26.0 - 34.0 PG  
 MCHC 30.8 30.0 - 36.5 g/dL RDW 25.6 (H) 11.5 - 14.5 % PLATELET 202 402 - 540 K/uL MPV 11.6 8.9 - 12.9 FL  
 NRBC 0.7 (H) 0  WBC ABSOLUTE NRBC 0.16 (H) 0.00 - 0.01 K/uL NEUTROPHILS 69 32 - 75 % BAND NEUTROPHILS 1 % LYMPHOCYTES 12 12 - 49 % MONOCYTES 18 (H) 5 - 13 % EOSINOPHILS 0 0 - 7 % BASOPHILS 0 0 - 1 % IMMATURE GRANULOCYTES 0 0.0 - 0.5 % ABS. NEUTROPHILS 16.1 (H) 1.8 - 8.0 K/UL  
 ABS. LYMPHOCYTES 2.8 0.8 - 3.5 K/UL  
 ABS. MONOCYTES 4.2 (H) 0.0 - 1.0 K/UL  
 ABS. EOSINOPHILS 0.0 0.0 - 0.4 K/UL  
 ABS. BASOPHILS 0.0 0.0 - 0.1 K/UL  
 ABS. IMM. GRANS. 0.0 0.00 - 0.04 K/UL  
 DF AUTOMATED    
 RBC COMMENTS ANISOCYTOSIS 2+ 
    
 RBC COMMENTS MACROCYTOSIS 
2+ AMMONIA Collection Time: 02/07/19  3:10 AM  
Result Value Ref Range Ammonia 48 (H) <32 UMOL/L  
PHOSPHORUS Collection Time: 02/07/19  3:11 AM  
Result Value Ref Range Phosphorus 2.6 2.6 - 4.7 MG/DL  
METABOLIC PANEL, BASIC Collection Time: 02/07/19  3:11 AM  
Result Value Ref Range Sodium 149 (H) 136 - 145 mmol/L Potassium 3.6 3.5 - 5.1 mmol/L Chloride 120 (H) 97 - 108 mmol/L  
 CO2 20 (L) 21 - 32 mmol/L Anion gap 9 5 - 15 mmol/L Glucose 136 (H) 65 - 100 mg/dL BUN 66 (H) 6 - 20 MG/DL Creatinine 2.20 (H) 0.70 - 1.30 MG/DL  
 BUN/Creatinine ratio 30 (H) 12 - 20 GFR est AA 37 (L) >60 ml/min/1.73m2 GFR est non-AA 30 (L) >60 ml/min/1.73m2 Calcium 7.5 (L) 8.5 - 10.1 MG/DL Recent Labs 02/07/19 
6337 02/06/19 
2543 02/05/19 
6041 * 150* 148* K 3.6 3.3* 3.9 * 121* 118* CO2 20* 22 23 BUN 66* 56* 58* CREA 2.20* 1.87* 1.87* * 135* 114* CA 7.5* 7.6* 6.8*  
MG  --  2.8* 2.3 PHOS 2.6 1.9* 2.3* Recent Labs 02/06/19 
2353 02/05/19 
4934 INR 1.7* 1.8* PTP 17.3* 18.2* Recent Labs 02/06/19 
4113 02/05/19 
6074 SGOT 1,114* 1,644* AP 91 97  
TP 6.4 6.0* ALB 1.8* 1.9*  
GLOB 4.6* 4.1*  
LPSE 307 210 Impression: 
 
Hep C + EtOH cirrhosis with mass on liver on imaging Acute GI hemorrhage due bleeding esophageal varices s/p EGD with band ligation Sepsis Leukocytosis Lactic acidosis Gallstones Pancreatitis on admission Ischemic liver injury Ascites (Paracentesis neg for SBP) Altered mental status Plan: 
Mental status still not improving despite lower ammonia and lactulose enemas ? Etiology. No further GI bleeding. 
-continue broad spectrum abx: Levo/flagyl but no cholecystitis or SBP on testing 
-consider palliative care consult 
-if NG tube needed due to prolonged hospitalization benefits outweigh risks 
-still has overall prognosis guarded Breezy Gurrola MD 
 
2/7/2019 0738 [facility], [address] [place] [address] Loc: [phone]

## 2019-02-07 NOTE — PROGRESS NOTES
Hospitalist Progress Note NAME: Donal Perera :  1955 MRN:  [id number] Assessment / Plan: 
No change over last 24 hours Sepsis, POA 
-acute respiratory failure: vent support  
 
-Acute blood loss anemia, variceal - sp egd,  cont octreotide. Cont to monitor hh (stable). Gi following. Vitamin k x 3 days 
  
 
- encephalopathy- hepatic, metabolic-  
 
Acute pancreatitis by CT - cont iv fluids, lipase wnl 
 
- Hypernatremnia not helping- worse- 
 
Liver cirrhosis, portal HTN Hx of hep C, reports completed treatment 
-s/p CVC placement  
 
Cholecystitis - us abdomen nondiagnositic. Wbc remains elevated on zosyn.   
  
  
Acute renal failure 
-due pre-renal in the setting of blood loss Cont iv fluids. 
  
HTN 
-hold home meds due to risk of hemodynamic instability 
-IV prn hydralazine 
  
Leukocytosis 
- repeat ct pending to r/o acut echole/pancreatitis. Cont rocephin and add flagyl 
  
Hx of IVDU 
-check UDS 
  
Tobacco use Alcohol use  
-cessation counseled 
-nicotine patch, CIWA protocol, banana bag, fenatnyl 
  
 
 
40 or above Morbid obesity / Body mass index is 43.26 kg/m². Code status: Full Prophylaxis: SCD's Recommended Disposition: Home w/Family Subjective: Chief Complaint / Reason for Physician Visit \"intubated and sedated. \". Discussed with RN events overnight. Review of Systems: 
Symptom Y/N Comments  Symptom Y/N Comments Fever/Chills    Chest Pain Poor Appetite    Edema Cough    Abdominal Pain Sputum    Joint Pain SOB/DOSHI    Pruritis/Rash Nausea/vomit    Tolerating PT/OT Diarrhea    Tolerating Diet Constipation    Other Could NOT obtain due to:   
 
Objective: VITALS:  
Last 24hrs VS reviewed since prior progress note. Most recent are: 
Patient Vitals for the past 24 hrs: 
 Temp Pulse Resp BP SpO2  
19 1700  (!) 116 29 135/73 98 % 19 1629  (!) 111 29  99 % 02/07/19 1600 98.3 °F (36.8 °C) (!) 112 29 139/69 99 % 02/07/19 1500  (!) 136 28 147/75 99 % 02/07/19 1400  (!) 120 24 132/75 99 % 02/07/19 1300  (!) 120 28 140/78 99 % 02/07/19 1230     99 % 02/07/19 1200 98.6 °F (37 °C) (!) 115 29 126/90 99 % 02/07/19 1151  (!) 111 28  99 % 02/07/19 1130  (!) 105 28  100 % 02/07/19 1100 98.6 °F (37 °C) (!) 115 29 143/72 99 % 02/07/19 1000  (!) 116 26 120/76 98 % 02/07/19 0900  (!) 116 28  98 % 02/07/19 0807  (!) 107 28  100 % 02/07/19 0800 99.2 °F (37.3 °C) (!) 119 27 120/71 99 % 02/07/19 0700 99.2 °F (37.3 °C) (!) 116 30 139/70 100 % 02/07/19 0628 100.2 °F (37.9 °C)      
02/07/19 0600  (!) 123 (!) 31 153/89 98 % 02/07/19 0500  (!) 133 (!) 32 153/69 98 % 02/07/19 0440  (!) 132 (!) 32  98 % 02/07/19 0400 (!) 100.6 °F (38.1 °C) (!) 129 (!) 32 142/71 98 % 02/07/19 0300  (!) 130 (!) 31 142/74 99 % 02/07/19 0200  (!) 120 (!) 34 127/62 99 % 02/07/19 0130  (!) 122 (!) 33  98 % 02/07/19 0115  (!) 122 (!) 34  99 % 02/07/19 0009  (!) 117 (!) 32  99 % 02/07/19 0000 98.9 °F (37.2 °C) (!) 117 (!) 33 141/72 99 % 02/06/19 2306  (!) 133  110/74   
02/06/19 2300  (!) 132 (!) 34 110/74 99 % 02/06/19 2200  (!) 134 (!) 32 142/67 99 % 02/06/19 2100  (!) 129 30 137/71 99 % 02/06/19 2020  (!) 132 (!) 32  97 % 02/06/19 2000 99.8 °F (37.7 °C) (!) 129 30 135/77 97 % 02/06/19 1900  (!) 128 (!) 31 149/77 97 % Intake/Output Summary (Last 24 hours) at 2/7/2019 1825 Last data filed at 2/7/2019 1600 Gross per 24 hour Intake 4038.33 ml Output 3688 ml Net 350.33 ml PHYSICAL EXAM: 
General: Intubated and sedates EENT:  EOMI. Anicteric sclerae. MMM Resp:  CTA bilaterally, no wheezing or rales. No accessory muscle use CV:  Regular  rhythm,  No edema GI:  Soft, Non distended, Non tender.  +Bowel sounds Neurologic:  sedated Psych:   Good insight. Not anxious nor agitated Skin:  No rashes. No jaundice Reviewed most current lab test results and cultures  YES Reviewed most current radiology test results   YES Review and summation of old records today    NO Reviewed patient's current orders and MAR    YES 
PMH/SH reviewed - no change compared to H&P 
________________________________________________________________________ Care Plan discussed with: 
  Comments Patient Family RN Care Manager Consultant Multidiciplinary team rounds were held today with , nursing, pharmacist and clinical coordinator. Patient's plan of care was discussed; medications were reviewed and discharge planning was addressed. ________________________________________________________________________ Total NON critical care TIME:  20   Minutes Total CRITICAL CARE TIME Spent:   Minutes non procedure based Comments >50% of visit spent in counseling and coordination of care    
________________________________________________________________________ Rufino Moyer MD  
 
Procedures: see electronic medical records for all procedures/Xrays and details which were not copied into this note but were reviewed prior to creation of Plan. LABS: 
I reviewed today's most current labs and imaging studies. Pertinent labs include: 
Recent Labs 02/07/19 
0310 02/06/19 
8789 02/05/19 
5477 WBC 23.1* 26.9* 29.3* HGB 7.8* 8.2* 7.7* HCT 25.3* 26.7* 24.9*  
 204 162 Recent Labs 02/07/19 
9855 02/06/19 
6715 02/05/19 
8018 * 150* 148* K 3.6 3.3* 3.9 * 121* 118* CO2 20* 22 23 * 135* 114* BUN 66* 56* 58* CREA 2.20* 1.87* 1.87* CA 7.5* 7.6* 6.8*  
MG  --  2.8* 2.3 PHOS 2.6 1.9* 2.3* ALB  --  1.8* 1.9* TBILI  --  7.7* 6.3*  
SGOT  --  1,114* 1,644* ALT  --  838* 924* INR  --  1.7* 1.8* Signed: Rufino Moyer MD

## 2019-02-08 NOTE — PROGRESS NOTES
Problem: Pressure Injury - Risk of 
Goal: *Prevention of pressure injury Document Naresh Scale and appropriate interventions in the flowsheet. Outcome: Progressing Towards Goal 
Pressure Injury Interventions: 
Sensory Interventions: Assess changes in LOC, Check visual cues for pain, Float heels, Keep linens dry and wrinkle-free, Minimize linen layers, Monitor skin under medical devices, Pad between skin to skin, Pressure redistribution bed/mattress (bed type), Turn and reposition approx. every two hours (pillows and wedges if needed) Moisture Interventions: Absorbent underpads, Apply protective barrier, creams and emollients, Internal/External urinary devices, Internal/External fecal devices, Maintain skin hydration (lotion/cream), Minimize layers, Moisture barrier Activity Interventions: Pressure redistribution bed/mattress(bed type) Mobility Interventions: Assess need for specialty bed, Chair cushion, Float heels, HOB 30 degrees or less, Pressure redistribution bed/mattress (bed type), Turn and reposition approx. every two hours(pillow and wedges) Nutrition Interventions: Document food/fluid/supplement intake Friction and Shear Interventions: Apply protective barrier, creams and emollients, Foam dressings/transparent film/skin sealants, HOB 30 degrees or less, Lift sheet, Lift team/patient mobility team, Minimize layers, Transferring/repositioning devices Problem: Falls - Risk of 
Goal: *Absence of Falls Document [facility] Fall Risk and appropriate interventions in the flowsheet. Outcome: Progressing Towards Goal 
Fall Risk Interventions: 
Mobility Interventions: Bed/chair exit alarm, Strengthening exercises (ROM-active/passive) Mentation Interventions: Adequate sleep, hydration, pain control, Bed/chair exit alarm, Door open when patient unattended, Evaluate medications/consider consulting pharmacy, More frequent rounding, Room close to nurse's station, Toileting rounds, Update white board Medication Interventions: Bed/chair exit alarm, Evaluate medications/consider consulting pharmacy Elimination Interventions: Bed/chair exit alarm, Call light in reach, Toileting schedule/hourly rounds History of Falls Interventions: Bed/chair exit alarm, Door open when patient unattended, Evaluate medications/consider consulting pharmacy, Room close to nurse's station
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
## 2019-02-08 NOTE — PROGRESS NOTES
0715-Bedside and Verbal shift change report given to Neto Islas RN (oncoming nurse) by Donta Mckay RN (offgoing nurse). Report included the following information SBAR, Kardex, ED Summary, Procedure Summary, Intake/Output, MAR, Recent Results and Cardiac Rhythm S tach. 1000-Patient completed 1 unit blood transfusion. Per Dr. Ruth Nguyen, recheck K+ in am. 
 
1100-Dr. Walter Dinh updated by RN and plans to meet with patient's family for a meeting this afternoon. 1230-Dr. Tucker at bedside assessing patient. 1500-Patient's family currently in a meeting with Dr. Murali Ho and Dr. Walter Dinh. 1630-EEG complete. 1900-Bedside and Verbal shift change report given to Laura Chance RN (oncoming nurse) by Neto Islas RN (offgoing nurse). Report included the following information SBAR, Kardex, ED Summary, Procedure Summary, Intake/Output, MAR, Recent Results and Cardiac Rhythm S. tach.

## 2019-02-08 NOTE — PROCEDURES
[address] [facility], [facility] Electroencephalogram 
  
 
 
Procedure Date: 02/08/19 Procedure ID: MR ?? ??? 
 
Procedure Type: Coma Patient Name: Mary Torres     YOB: 1955 Medical Record No: [id number] INDICATION: Coma Medications: 
 
Current Facility-Administered Medications:  
  0.9% sodium chloride infusion 250 mL, 250 mL, IntraVENous, PRN, Ameya Castillo MD 
  octreotide (SANDOSTATIN) 500 mcg in 0.9% sodium chloride 500 mL infusion, 50 mcg/hr, IntraVENous, CONTINUOUS, Larry Dyer MD, Last Rate: 50 mL/hr at 02/08/19 0914, 50 mcg/hr at 02/08/19 0914 
  0.9% sodium chloride infusion 250 mL, 250 mL, IntraVENous, PRN, Allie Gregorio MD 
  [START ON 2/10/2019] levoFLOXacin (LEVAQUIN) 750 mg in D5W IVPB, 750 mg, IntraVENous, Q48H, Allie Gregorio MD 
  albumin human 25% (BUMINATE) solution 25 g, 25 g, IntraVENous, Q6H, Caio SOTO MD, 25 g at 02/08/19 1355   lactulose (CHRONULAC) solution 30 g, 45 mL, Per NG tube, TID, Allie Gregorio MD, 30 g at 02/08/19 1546 
  rifAXIMin (XIFAXAN) tablet 550 mg, 550 mg, Oral, BID, Kati Morse MD, 550 mg at 02/08/19 0847 
  metroNIDAZOLE (FLAGYL) IVPB premix 500 mg, 500 mg, IntraVENous, Q12H, Allie Gregorio MD, Last Rate: 100 mL/hr at 02/08/19 0914, 500 mg at 02/08/19 2687   dextrose 5 % - 0.2% NaCl infusion, 100 mL/hr, IntraVENous, CONTINUOUS, Allie Gregorio MD, Last Rate: 100 mL/hr at 02/07/19 2200, 100 mL/hr at 02/07/19 2200   acetaminophen (TYLENOL) suppository 650 mg, 650 mg, Rectal, Q8H PRN, Allie Gregorio MD 
  [facility]) 2 % ointment, , Both Nostrils, BID, Torres Peter Martinez MD 
  influenza vaccine 2018-19 (6 mos+)(PF) (FLUARIX QUAD/FLULAVAL QUAD) injection 0.5 mL, 0.5 mL, IntraMUSCular, PRIOR TO DISCHARGE, Kevin Solorio DO 
  chlorhexidine (PERIDEX) 0.12 % mouthwash 15 mL, 15 mL, Oral, Q12H, Kevin Solorio, DO, 15 mL at 02/08/19 5091   metoprolol (LOPRESSOR) injection 5 mg, 5 mg, IntraVENous, Q6H, Frankie Reid MD, 5 mg at 02/08/19 1355   pantoprazole (PROTONIX) 40 mg in sodium chloride 0.9% 10 mL injection, 40 mg, IntraVENous, Q12H, Nirali Byrnes MD, 40 mg at 02/08/19 0800 
  sodium chloride (NS) flush 5-40 mL, 5-40 mL, IntraVENous, Q8H, Nirali Byrnes MD, 10 mL at 02/08/19 1355   sodium chloride (NS) flush 5-40 mL, 5-40 mL, IntraVENous, PRN, Samina Orozco MD 
  hydrALAZINE (APRESOLINE) 20 mg/mL injection 10 mg, 10 mg, IntraVENous, Q6H PRN, Mark West MD, 10 mg at 02/02/19 2039 
  nicotine (NICODERM CQ) 21 mg/24 hr patch 1 Patch, 1 Patch, TransDERmal, DAILY, Mark West MD, 1 Patch at 02/08/19 0800   LORazepam (ATIVAN) injection 2 mg, 2 mg, IntraVENous, Q1H PRN, Mayito Roldan Caro, MD, 2 mg at 02/05/19 0020   LORazepam (ATIVAN) injection 4 mg, 4 mg, IntraVENous, Q1H PRN, Mayito Roldan Caro, MD, 4 mg at 02/03/19 5400 DESCRIPTION OF PROCEDURE: Electrodes were applied in accordance with the international 10-20 system of electrode placement. EEG was reviewed in both bipolar and referential montages DESCRIPTION OF FINDINGS: Background is slow generalized delta activity with no appreciable reactivity on photic an other tech initiated activation techniques. No seizures noted. Triphasic waves are seen through out the record. IMPRESSION: 
Abnormal EEG with severe cortical dysfunction and triphasic discharges which are classically seen in hepatic encephalopathy but can be noted in other global insults such as anoxic injury as well as other metabolic etiologies. This pattern usually bodes an ominous prognosis. No seizures noted. Absence of seizures on this study does not exclude epilepsy. Clinical correlation is advised.
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
## 2019-02-08 NOTE — ACP (ADVANCE CARE PLANNING)
Primary Decision [facility] (Health Care Agent): [facility] Relationship to patient: wife Phone number: [phone] [] Named in a scanned document  
[x] Legal Next of Kin 
[] Guardian Secondary Decision Maker (First Alternate Health Care Agent):  
Relationship to patient: 
Phone number: 
[] Named in a scanned document  
[] Legal Next of Kin 
[] Guardian ACP documents you current have include: 
[] Advance Directive or Living Will 
[] Durable Do Not Resuscitate 
[] Physician Orders for Scope of Treatment (POST) [] Medical Power of  
[] Other

## 2019-02-08 NOTE — PROGRESS NOTES
Respiratory Care; 
 
Patient was placed on weaning trails, patient became tachypnea RR was 37bpm, placed patient back on original settings.

## 2019-02-08 NOTE — PROGRESS NOTES
Brief summary of visit , full note will follow. Met with family , wife /next of kin and daughter, Dr Clara Lozano joined us in later part of meeting. expalined current medical issues(multiorgan failure ), family is concerned \" why he is not waking up \". Dr Clara Lozano explained , altered mental status,  related to liver and renal failure , possibility of stroke, MRI more specific for stroke , however cannot be done because he is too unstable , EEG ordered. We talked about CPR, non meaning ful , family is thinking along the line of DNAR, explained DNAR , would not impact treatment of medical issues, they are waiting to discuss with his son , before changing his code status. Plan communicated with bed side ROYCE Boyle .

## 2019-02-08 NOTE — PROGRESS NOTES
2/8/2019 INTENSIVIST PROGRESS NOTE:  
 
Patient seen and evaluated, chart reviewed 62 yo male presented with UGI bleed, hematemesis EGD per GI No acute events overnight Remained intubated post procedure Now pt in CCU intubated, sedated No acute events overnight Still unresponsive on vent. ROS: unable to obtain Visit Vitals /69 Pulse (!) 111 Temp 99.3 °F (37.4 °C) Resp (!) 33 Wt 125.3 kg (276 lb 3.8 oz) SpO2 97% BMI 43.26 kg/m² General: sedated, intubated on CPAP Eyes: anicteric HEENT: ETT in place Neck: FROM 
CV: RRR Lungs: clear Abd: soft : no flank pain Ext: no edema Skin: no rash Musculoskeletal: normal inspection Neuro: sedated CXR: bibasilar atx Labs: 
 
Recent Labs 02/08/19 
0310 02/07/19 
0310 02/06/19 
6452 WBC 16.1* 23.1* 26.9* HGB 6.8* 7.8* 8.2*  
* 202 204 INR  --   --  1.7* Recent Labs 02/08/19 
0310 02/07/19 
1660 02/06/19 
0809 02/06/19 
6819 * 149*  --  150*  
K 3.4* 3.6  --  3.3*  
* 120*  --  121* CO2 20* 20*  --  22 * 136*  --  135* BUN 64* 66*  --  56* CREA 2.32* 2.20*  --  1.87* CA 7.9* 7.5*  --  7.6*  
MG  --   --   --  2.8* PHOS  --  2.6  --  1.9* LAC  --   --  1.7  --   
ALB  --   --   --  1.8* SGOT 337*  --   --  1,114* ALT  --   --   --  838* LPSE 674*  --   --  307 No results for input(s): PH, PCO2, PO2, HCO3, FIO2 in the last 72 hours. No results for input(s): CPK, CKNDX, TROIQ in the last 72 hours. No lab exists for component: CPKMB No results found for: BNPP, BNP Labs reviewed CXR reviewed- ETT in good position, bibasilar atx A/P: 
- acute respiratory failure: vent support for airway protection; not ready for liberation 
- encephalopathy- hepatic, metabolic- will ask neuro to see today - Hypernatremia: same- will be hard to manage without free water 
- UGI bleed: transfuse as needed, IV protonix, IV octreotide - esophageal varices: IV octreotide 
- acute pancreatitis - cirrhosis by ultrasound with portal HTN 
- LFTS has peaked; likely some ischemia per Dr. Milagro Head 
- hypocalcemia - SYDNEY: worse - BP control 
- lactulose with FMS 
- IVF-  
- IV abx  
- enteral feeds 
- replete lytes as needed 
- sedation  
- critically ill - Will assist on disposition planning when stable for transfer Jose Wagner MD

## 2019-02-08 NOTE — PROGRESS NOTES
Gastroenterology Daily Progress Note (Dr. John Muhammad) [facility] Admit Date: 2/2/2019 Subjective:   
  
Dark stool in rectal tube but no in NG tube. Not responding to stimuli. Posturing per nursing. Current Facility-Administered Medications Medication Dose Route Frequency  0.9% sodium chloride infusion 250 mL  250 mL IntraVENous PRN  
 octreotide (SANDOSTATIN) 500 mcg in 0.9% sodium chloride 500 mL infusion  50 mcg/hr IntraVENous CONTINUOUS  
 albumin human 25% (BUMINATE) solution 25 g  25 g IntraVENous Q6H  
 lactulose (CHRONULAC) solution 30 g  45 mL Per NG tube TID  rifAXIMin (XIFAXAN) tablet 550 mg  550 mg Oral BID  levoFLOXacin (LEVAQUIN) 750 mg in D5W IVPB  750 mg IntraVENous Q24H  
 metroNIDAZOLE (FLAGYL) IVPB premix 500 mg  500 mg IntraVENous Q12H  
 dextrose 5 % - 0.2% NaCl infusion  100 mL/hr IntraVENous CONTINUOUS  
 acetaminophen (TYLENOL) suppository 650 mg  650 mg Rectal Q8H PRN  
 mupirocin (BACTROBAN) 2 % ointment   Both Nostrils BID  influenza vaccine 2018-19 (6 mos+)(PF) (FLUARIX QUAD/FLULAVAL QUAD) injection 0.5 mL  0.5 mL IntraMUSCular PRIOR TO DISCHARGE  chlorhexidine (PERIDEX) 0.12 % mouthwash 15 mL  15 mL Oral Q12H  
 metoprolol (LOPRESSOR) injection 5 mg  5 mg IntraVENous Q6H  
 pantoprazole (PROTONIX) 40 mg in sodium chloride 0.9% 10 mL injection  40 mg IntraVENous Q12H  
 sodium chloride (NS) flush 5-40 mL  5-40 mL IntraVENous Q8H  
 sodium chloride (NS) flush 5-40 mL  5-40 mL IntraVENous PRN  
 hydrALAZINE (APRESOLINE) 20 mg/mL injection 10 mg  10 mg IntraVENous Q6H PRN  
 nicotine (NICODERM CQ) 21 mg/24 hr patch 1 Patch  1 Patch TransDERmal DAILY  LORazepam (ATIVAN) injection 2 mg  2 mg IntraVENous Q1H PRN  
 LORazepam (ATIVAN) injection 4 mg  4 mg IntraVENous Q1H PRN Objective:  
 
Visit Vitals /69 Pulse (!) 105 Temp 99 °F (37.2 °C) Resp 30 Wt 125.3 kg (276 lb 3.8 oz) SpO2 99% BMI 43.26 kg/m² Blood pressure 156/69, pulse (!) 105, temperature 99 °F (37.2 °C), resp. rate 30, weight 125.3 kg (276 lb 3.8 oz), SpO2 99 %. No intake/output data recorded. 02/06 1901 - 02/08 0700 In: 8702 [I.V.:5695] Out: Cj Mcneal [Urine:2053; Drains:2950] Intake/Output Summary (Last 24 hours) at 2/8/2019 0730 Last data filed at 2/8/2019 0600 Gross per 24 hour Intake 4115 ml Output 2335 ml Net 1780 ml Physical Exam:  
 
General: Lethargic, intubated BM Chest:  Lungs cta b/l Heart: tachycardic, reg rhythm GI: Soft, nontender, mild distention, + BS Labs:  
 
Recent Results (from the past 24 hour(s)) AMMONIA Collection Time: 02/08/19  3:10 AM  
Result Value Ref Range Ammonia 44 (H) <32 UMOL/L  
AST Collection Time: 02/08/19  3:10 AM  
Result Value Ref Range AST (SGOT) 337 (H) 15 - 37 U/L  
BILIRUBIN, TOTAL Collection Time: 02/08/19  3:10 AM  
Result Value Ref Range Bilirubin, total 12.4 (H) 0.2 - 1.0 MG/DL  
LIPASE Collection Time: 02/08/19  3:10 AM  
Result Value Ref Range Lipase 674 (H) 73 - 668 U/L  
METABOLIC PANEL, BASIC Collection Time: 02/08/19  3:10 AM  
Result Value Ref Range Sodium 148 (H) 136 - 145 mmol/L Potassium 3.4 (L) 3.5 - 5.1 mmol/L Chloride 118 (H) 97 - 108 mmol/L  
 CO2 20 (L) 21 - 32 mmol/L Anion gap 10 5 - 15 mmol/L Glucose 135 (H) 65 - 100 mg/dL BUN 64 (H) 6 - 20 MG/DL Creatinine 2.32 (H) 0.70 - 1.30 MG/DL  
 BUN/Creatinine ratio 28 (H) 12 - 20 GFR est AA 35 (L) >60 ml/min/1.73m2 GFR est non-AA 29 (L) >60 ml/min/1.73m2 Calcium 7.9 (L) 8.5 - 10.1 MG/DL  
CBC WITH AUTOMATED DIFF Collection Time: 02/08/19  3:10 AM  
Result Value Ref Range WBC 16.1 (H) 4.1 - 11.1 K/uL  
 RBC 2.06 (L) 4.10 - 5.70 M/uL HGB 6.8 (L) 12.1 - 17.0 g/dL HCT 21.8 (L) 36.6 - 50.3 % .8 (H) 80.0 - 99.0 FL  
 MCH 33.0 26.0 - 34.0 PG  
 MCHC 31.2 30.0 - 36.5 g/dL RDW 25.2 (H) 11.5 - 14.5 % PLATELET 135 (L) 809 - 400 K/uL MPV 11.5 8.9 - 12.9 FL  
 NRBC 0.4 (H) 0  WBC ABSOLUTE NRBC 0.06 (H) 0.00 - 0.01 K/uL NEUTROPHILS 72 32 - 75 % LYMPHOCYTES 12 12 - 49 % MONOCYTES 13 5 - 13 % EOSINOPHILS 0 0 - 7 % BASOPHILS 0 0 - 1 % METAMYELOCYTES 2 % MYELOCYTES 1 % IMMATURE GRANULOCYTES 0 0.0 - 0.5 % ABS. NEUTROPHILS 11.6 (H) 1.8 - 8.0 K/UL  
 ABS. LYMPHOCYTES 1.9 0.8 - 3.5 K/UL  
 ABS. MONOCYTES 2.1 (H) 0.0 - 1.0 K/UL  
 ABS. EOSINOPHILS 0.0 0.0 - 0.4 K/UL  
 ABS. BASOPHILS 0.0 0.0 - 0.1 K/UL  
 ABS. IMM. GRANS. 0.0 0.00 - 0.04 K/UL  
 DF MANUAL    
 RBC COMMENTS ANISOCYTOSIS 3+ 
    
 RBC COMMENTS POLYCHROMASIA 2+ TYPE + CROSSMATCH Collection Time: 02/08/19  4:16 AM  
Result Value Ref Range Crossmatch Expiration 02/11/2019 ABO/Rh(D) B POSITIVE Antibody screen NEG Unit number [id number] Blood component type RC LR Unit division 00 Status of unit ISSUED Crossmatch result Compatible POC G3 - PUL Collection Time: 02/08/19  6:15 AM  
Result Value Ref Range FIO2 (POC) 0.40 % pH (POC) 7.460 (H) 7.35 - 7.45    
 pCO2 (POC) 26.1 (L) 35.0 - 45.0 MMHG  
 pO2 (POC) 113 (H) 80 - 100 MMHG  
 HCO3 (POC) 18.6 (L) 22 - 26 MMOL/L  
 sO2 (POC) 99 (H) 92 - 97 % Base deficit (POC) 5 mmol/L Site RIGHT RADIAL Device: VENT Mode ASSIST CONTROL Tidal volume 500 ml Set Rate 12 bpm  
 PEEP/CPAP (POC) 6 cmH2O Allens test (POC) YES Specimen type (POC) ARTERIAL Total resp. rate 27 Recent Labs 02/08/19 
0310 02/07/19 2077 02/06/19 
6841 * 149* 150*  
K 3.4* 3.6 3.3*  
* 120* 121* CO2 20* 20* 22 BUN 64* 66* 56* CREA 2.32* 2.20* 1.87* * 136* 135* CA 7.9* 7.5* 7.6*  
MG  --   --  2.8* PHOS  --  2.6 1.9* Recent Labs 02/06/19 
9889 INR 1.7* PTP 17.3* Recent Labs 02/08/19 
0310 02/06/19 
9100 SGOT 337* 1,114* AP  --  91  
TP  --  6.4 ALB  --  1.8*  
 GLOB  --  4.6* LPSE 674* 307 Impression: 
 
Hep C + EtOH cirrhosis with mass on liver on imaging Acute GI hemorrhage due bleeding esophageal varices s/p EGD with band ligation Sepsis Leukocytosis Lactic acidosis Gallstones Pancreatitis on admission Ischemic liver injury Ascites (Paracentesis neg for SBP) Altered mental status Plan: 
Despite improvement in his LFTs and ammonia normalizing he still is not responding to stimuli therefore I agree with neuro consult since unlikely hepatic encephalopathy the main etiology for his persistent AMS/Coma state. May have some GI bleeding but no plans for repeat EGD for now. NG without blood in it. 
 
-resume Octreotide drip 
-continue lactulose and xifaxan via NG 
-IF pt goes for MRI brain once seen by neurology then I would try to add on MRI liver to evaluate mass on liver suspicious for hepatocellular CA on CT 
-palliative care consult for treatment goals ( I have d/w pt's wife and daughter his guarded prognosis due to acute liver failure, renal failure, encephalopathy and superimposed on probable malignancy in liver 
-on call GI to follow over weekend Breezy Gurrola MD 
 
2/8/2019 [address] [place] [address], [address] [id number] Loc: [phone]

## 2019-02-08 NOTE — PROGRESS NOTES
1900 Bedside and Verbal shift change report given to Trinidad Montana RN (oncoming nurse) by Farzana Crooks RN (offgoing nurse). Report included the following information SBAR, Kardex, ED Summary, Procedure Summary, Intake/Output, MAR, Recent Results and Cardiac Rhythm ST.  
 
2000 Shift assessment complete - see flowsheet for details; patient still unresponsive, does not withdraw from pain, but has gag reflex & coughs with suction; edematous in all extremities; lung sounds clear but diminished; abdomen distended; TF TwokalHN at 10mL/hr with q6h 150mL flush - gastric residual 10mL; cortez & flexiseal in place 2240 Patient had leak around flexiseal of dark brown loose stool; bathed patient - changed linens, gown, bed pad, TAPS; electrodes changed 0000 Reassessment - when suctioning patient his whole body moved for the first time, almost like he was posturing but was only for a few seconds; otherwise no changes noted to previous assessment; TF rate increased to 20mL/hr at this time 0310 Labs drawn & sent 
 
0400 Reassessment - patient's temperature has risen some, now 99.1F, will continue to monitor; otherwise no changes to previous assessment 0405 TeleHospitalist notified of Hgb 6.8 & K 3.4; orders received to give 1 unit of blood 7418 Type & Crossmatch drawn & sent to lab  
 
0630 Transfusion of 1 unit PRBCs started at this time at 75mL/hr; will monitor patient for first 15min to observe for s/sx of reaction 4197 First 15 min of blood transfusion complete - no s/sx of reaction; rate increased to 125mL/hr; patient also passed SBT at this time 
 
0700 Bedside and Verbal shift change report given to Vonnie Curry (oncoming nurse) by Trinidad Montana RN (offgoing nurse). Report included the following information SBAR, Kardex, ED Summary, Procedure Summary, Intake/Output, MAR, Recent Results and Cardiac Rhythm ST.

## 2019-02-08 NOTE — PROGRESS NOTES
Family meeting  With his wife , today at 3:30 pm , gave my direct number to Annalisa Piper to stanislaw me when family arrives. Thank you for including palliative care in the care of this patient . Robert Hoffman MD 
 
[phone] ([phone]

## 2019-02-08 NOTE — PROGRESS NOTES
Palliative Medicine Family Meeting Participants:  Patient wife and daughter, palliative medicine (Tania Sanchez, Dr. Ken Germain. Discussion:  
 
1. Introduced palliative services 2. Discussed the fact that while numbers are improving, patient isn't and isn't waking up. Discussed specific blood test results related to liver, kidneys, WBC. See Dr. Ken Germain note 3. Family asking if patient has been sedated while intubated - Dr. Fatima Client (neuro) who joined discussed said he had not been sedated for several days 4. Dr. Fatima Client discussed neuro activity. See his note. 5. SOCIAL: Family was initially defensive and questioning if patient received appropriate care, but as conversation wore on they became tearful. I took some time after doctors left to talk about who patient is as person. Family said he is spunky, a great dad and granddad to 3 kids and 15 grandchildren. Loves to spend time with his wife of 40+ years, family, dance, and watch TV. Former janitorial staff. Family is Confucianist -  has visited but agreeable to  support 6. Wife very tearful when acknowledging that she knows this is the end. She is [employer] employee and familiar with end of life / when things are bleak. She said she knew patient wouldn't want to suffer. We discussed that we can wait on EEG results now and see about those results. There is no pressure to make decisions today, but if they do get to point when they want to refocus goals on comfort, we can support that transition. Wife and daughter understanding of palliative and hospice. 7. DNR - wife said she would not want chest compressions for patient if heart stops but she wants to discuss with other family before making that formal decisions. 8. Informed family that a palliative MD is on call over weekend if they need support. Outcome/Plan:   
 
Wait on EEG results Follow up on DNR, vasopressors Family may be ready to discuss transitioning to comfort soon.  support requested Follow for psycho social support. VALENCIA Hung Palliative Medicine:  [phone])

## 2019-02-08 NOTE — PROGRESS NOTES
Pharmacy Automatic Renal Dosing Protocol - Antimicrobials Indication for Antimicrobials: Sepsis, intra-abdominal infection Current Regimen of Each Antimicrobial: 
Levaquin 750 mg IV q24h - started  day 3 of 10 Flagyl 500 mg IV q12h - started  day 3 of 10 Previous Antimicrobial Therapy: 
Zosyn 4.5 g Q8 hours  (Start Date ; Day # 3) Ceftriaxone 1 gram Q24 hours (Start Date ; Days #3) Significant Cultures:  
: Blood cx: ng - final 
: Urine cx: NG - Final 
: Body fluid: ng - final 
: Anaerobic cx: NG - final 
: Respiratory cx: moderate C Albicans - final 
 
Radiology / Imaging results: (X-ray, CT scan or MRI):  
: CT abdomen- IMPRESSION: 
  
Sludge and tiny calcified stones again noted in the gallbladder without any 
significant gallbladder distention. Findings are nonspecific. Cirrhotic liver with nonspecific 6-7 cm lesion in segment 8 of the hepatic dome 
concerning for possible hepatocellular carcinoma. Incompletely evaluated without 
dedicated contrast liver MRI or CT Nonspecific colitis involving the ascending colon Moderate to severe gastritis Bilateral medial lower lobe lung consolidation Splenomegaly. Mild ascites.  CT Head: 
IMPRESSION: No acute findings. Paralysis, amputations, malnutrition: N/a Labs: 
Recent Labs 19 
0310 19 
7576 19 
0310 19 
6796 CREA 2.32* 2.20*  --  1.87* BUN 64* 66*  --  56* WBC 16.1*  --  23.1* 26.9* Temp (24hrs), Av °F (37.2 °C), Min:97.9 °F (36.6 °C), Max:99.5 °F (37.5 °C) Creatinine Clearance (mL/min) or Dialysis:  
Estimated Creatinine Clearance: 41.4 mL/min (A) (based on SCr of 2.32 mg/dL (H)). Estimated Creatinine Clearance (using IBW):30.5 mL/min Impression/Plan: · Discussed LFTs, Bili and risk / benefirt of continued Metronidazole use and will continue LQ + Metronidazole · Adjust Levofloxacin dose to 750 mg IV q48h for SCr rise; last dose 2/15 · Continue  Metronidazole dose to 500mg IV q12h per protocol, last dose 2/15 · BMP + CBC already ordered for 2/9 Pharmacy will follow daily and adjust medications as appropriate for renal function and/or serum levels.  
 
Thank you, 
Angelica Ramirez, [facility]

## 2019-02-08 NOTE — PROGRESS NOTES
Was asked to attend meeting with family and discuss patients condition. Sat with patient's wife and daughter and discussed the nature of his predicament. I explained the that he had multiple organ failure and that addressing a condition affecting one organ can lead to compensation of another organ and that the viability of each organ depends on the support of the others as a group. I discussed the bleed which needed to be controlled which could have stressed the liver which caused his ammonia to accumulate. I was asked if the ammonia was the main culprit in his mental state. I explained that this is a measurable parameter that doesn't necessary give an accurate appreciation of the state of the liver but can be used as a guide. I asked the family to look at the body as multiple organs needing each others support and unfortunately his current illness has overwhelmed a fragile system because of his preexisting conditions and his prognosis is therefore fragile as well. 45 minutes spent on the floor discussing patient with family who is a critical state of health.

## 2019-02-08 NOTE — PROGRESS NOTES
Problem: Pressure Injury - Risk of 
Goal: *Prevention of pressure injury Document Naresh Scale and appropriate interventions in the flowsheet. Outcome: Progressing Towards Goal 
Pressure Injury Interventions: 
Sensory Interventions: Assess changes in LOC, Check visual cues for pain, Float heels, Keep linens dry and wrinkle-free, Minimize linen layers, Monitor skin under medical devices, Pad between skin to skin, Pressure redistribution bed/mattress (bed type), Turn and reposition approx. every two hours (pillows and wedges if needed) Moisture Interventions: Absorbent underpads, Apply protective barrier, creams and emollients, Internal/External urinary devices, Internal/External fecal devices, Maintain skin hydration (lotion/cream), Minimize layers, Moisture barrier Activity Interventions: Pressure redistribution bed/mattress(bed type) Mobility Interventions: Assess need for specialty bed, Chair cushion, Float heels, HOB 30 degrees or less, Pressure redistribution bed/mattress (bed type), Turn and reposition approx. every two hours(pillow and wedges) Nutrition Interventions: Document food/fluid/supplement intake Friction and Shear Interventions: Apply protective barrier, creams and emollients, Foam dressings/transparent film/skin sealants, HOB 30 degrees or less, Lift sheet, Lift team/patient mobility team, Minimize layers, Transferring/repositioning devices Problem: Falls - Risk of 
Goal: *Absence of Falls Document Cy Freer Fall Risk and appropriate interventions in the flowsheet. Outcome: Progressing Towards Goal 
Fall Risk Interventions: 
Mobility Interventions: Bed/chair exit alarm, Strengthening exercises (ROM-active/passive) Mentation Interventions: Adequate sleep, hydration, pain control, Bed/chair exit alarm, Door open when patient unattended, Evaluate medications/consider consulting pharmacy, More frequent rounding, Room close to nurse's station, Toileting rounds, Update white board Medication Interventions: Bed/chair exit alarm, Evaluate medications/consider consulting pharmacy Elimination Interventions: Bed/chair exit alarm, Call light in reach, Toileting schedule/hourly rounds History of Falls Interventions: Bed/chair exit alarm, Door open when patient unattended, Evaluate medications/consider consulting pharmacy, Room close to nurse's station

## 2019-02-08 NOTE — PROGRESS NOTES
Spiritual Care Assessment/Progress Note [facility] 
 
 
NAME: David Iglesias      MRN: [id number] AGE: 61 y.o. SEX: male Methodist Affiliation: [facility]  
Language: [place] 2/8/2019     Total Time (in minutes): 14 Spiritual Assessment begun in [facility] 2 CRITICAL CARE 3 through conversation with: 
  
    []Patient        [] Family    [] Friend(s) Reason for Consult: Palliative Care, Initial/Spiritual Assessment Spiritual beliefs: (Please include comment if needed) [x] Identifies with a constance tradition:     
   [x] Supported by a constance community:        
   [] Claims no spiritual orientation:       
   [] Seeking spiritual identity:            
   [] Adheres to an individual form of spirituality:       
   [] Not able to assess:                   
 
    
Identified resources for coping:  
   [x] Prayer                           
   [] Music                  [] Guided Imagery [x] Family/friends                 [] Pet visits [] Devotional reading                         [] Unknown 
   [] Other:                                         
 
 
Interventions offered during this visit: (See comments for more details) Patient Interventions: Prayer (actual) Family/Friend(s): Affirmation of constance, Affirmation of emotions/emotional suffering, Coping skills reviewed/reinforced, Iconic (affirming the presence of God/Higher Power), Prayer (assurance of), Prayer (actual) Plan of Care: 
 
 [x] Support spiritual and/or cultural needs  
 [] Support AMD and/or advance care planning process    
 [] Support grieving process 
 [] Coordinate Rites and/or Rituals  
 [] Coordination with community clergy 
 [x] No spiritual needs identified at this time 
 [] Detailed Plan of Care below (See Comments)  [] Make referral to Music Therapy 
[] Make referral to Pet Therapy    
[] Make referral to Addiction services 
[] Make referral to [facility] [] Make referral to Spiritual Care Partner 
[] No future visits requested       
[x] Follow up visits as needed Comments: Perry advised me that the family was with the patient desiring spiritual support. When I arrived I was greeted warmly. The spouse, daughter, and daughter-in-law introduced themselves to me and confirmed that they would like to have prayer. We gathered around the patient, who is intubated, and prayed. I offered my continued support to the family. . Ursula Tan EdD  MDiv Palliative  Fellow For [facility] Page [phone])

## 2019-02-09 NOTE — PROGRESS NOTES
Arrived in CCU for family conference Wilton Mcdonald MD 
12:26 PM 
2/9/2019 Finished family conference at 12:58 PM 
 
 
Chart reviewed post conference for clarification Current MELD score: 30 MELD-Na score 31 He has an abnormal CT showing a possible HCC. As noted in my prior note his alpha feto protein is 34 Reviewed with the family Wilton Mcdonald MD 
1:06 PM 
2/9/2019
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
## 2019-02-09 NOTE — PROGRESS NOTES
02/09/19 1123 ABCDEF Bundle SBT Safety Screen Passed Yes SBT Trial Passed Yes Weaning Parameters Spontaneous Breathing Trial Complete Yes Resp Rate Observed 28 Ve 13.8  RSBI 56

## 2019-02-09 NOTE — PROGRESS NOTES
F/U for encephalopathy Cirrhosis, s/p variceal bleed Phylicia Guest for [employer] S: Mr. Abdullahi Clark was seen by me today during rounds. At this time, he is resting + comfortably. He does not make eye contact. He is on a ventilator. I spoke in person with Bev Barr RN and reviewed her notes. I cannot get pfsh, ros, cc or hpi from the patient O: Blood pressure 141/59, pulse (!) 109, temperature (!) 101.2 °F (38.4 °C), resp. rate (!) 31, weight 125.3 kg (276 lb 3.8 oz), SpO2 96 %. Gen: Patient is in no acute distress. There is no jaundice. Lungs: Clear to auscultation bilaterally . Heart +: RRR. Tachycardia  Abd: distended and firm. Non tender, bowel sounds present. Extremities: Warm. Lab Results Component Value Date/Time WBC 17.9 (H) 02/09/2019 05:47 AM  
 HGB 7.5 (L) 02/09/2019 05:47 AM  
 HCT 24.2 (L) 02/09/2019 05:47 AM  
 PLATELET 486 (L) 18/85/3827 05:47 AM  
 .3 (H) 02/09/2019 05:47 AM  
last transfusion complete yesterday am   
Lab Results Component Value Date/Time Sodium 152 (H) 02/09/2019 05:47 AM  
 Potassium 3.6 02/09/2019 05:47 AM  
 Chloride 122 (H) 02/09/2019 05:47 AM  
 CO2 21 02/09/2019 05:47 AM  
 Anion gap 9 02/09/2019 05:47 AM  
 Glucose 120 (H) 02/09/2019 05:47 AM  
 BUN 60 (H) 02/09/2019 05:47 AM  
 Creatinine 2.26 (H) 02/09/2019 05:47 AM  
 BUN/Creatinine ratio 27 (H) 02/09/2019 05:47 AM  
 GFR est AA 36 (L) 02/09/2019 05:47 AM  
 GFR est non-AA 29 (L) 02/09/2019 05:47 AM  
 Calcium 7.8 (L) 02/09/2019 05:47 AM  
 Bilirubin, total 16.1 (H) 02/09/2019 05:47 AM  
 AST (SGOT) 180 (H) 02/09/2019 05:47 AM  
 Alk. phosphatase 82 02/09/2019 05:47 AM  
 Protein, total 6.4 02/09/2019 05:47 AM  
 Albumin 3.0 (L) 02/09/2019 05:47 AM  
 Globulin 3.4 02/09/2019 05:47 AM  
 A-G Ratio 0.9 (L) 02/09/2019 05:47 AM  
 ALT (SGPT) 232 (H) 02/09/2019 05:47 AM  
 
Lab Results Component Value Date/Time  INR 1.7 (H) 02/06/2019 03:33 AM  
 INR 1.8 (H) 02/05/2019 02:52 AM  
 INR 1.7 (H) 02/04/2019 04:17 AM  
 Prothrombin time 17.3 (H) 02/06/2019 03:33 AM  
 Prothrombin time 18.2 (H) 02/05/2019 02:52 AM  
 Prothrombin time 16.9 (H) 02/04/2019 04:17 AM  
 
 
Alpha fetoprotein  34.0 Eeg:   DESCRIPTION OF PROCEDURE: Electrodes were applied in accordance with the international 10-20 system of electrode placement. EEG was reviewed in both bipolar and referential montages 
  
DESCRIPTION OF FINDINGS: Background is slow generalized delta activity with no appreciable reactivity on photic an other tech initiated activation techniques. No seizures noted. Triphasic waves are seen through out the record.  
   
 
 
 
 
Ammonia:    44 yesterday Cross sectional imaging none new I note paracentsis 2. 4.2019:   
IMPRESSION IMPRESSION:  
  
Successful ultrasound guided diagnostic paracentesis yielding approximately 100 
cc of light yellow ml of fluid. Sample sent for the requested analysis. 
  cell count and diff not c/w sbp IMPRESSION IMPRESSION:  
  
Successful ultrasound guided diagnostic paracentesis yielding approximately 100 
cc of light yellow ml of fluid. Sample sent for the requested analysis. 
  
 
 
A: Active Problems: 
  GI bleed (2/2/2019) Comment:   Patient stable, mental status is concerning He is on max encephalopathy rx of xifaxan and rectal laculose P. I will follow Bela Marshall MD 
11:06 AM 
2/9/2019 :

## 2019-02-09 NOTE — PROGRESS NOTES
Hospitalist Progress Note NAME: Leland Blackwood :  1955 MRN:  [id number] Assessment / Plan: 
No change over last 24 hours Sepsis, POA 
-acute respiratory failure: vent support  
 
-Acute blood loss anemia, variceal - sp egd,  cont octreotide. Cont to monitor hh (stable). Gi following. Vitamin k x 3 days 
  
 
- encephalopathy- hepatic, metabolic-  
 
Acute pancreatitis by CT - cont iv fluids, lipase wnl 
 
- Hypernatremnia not helping- worse- 
 
Liver cirrhosis, portal HTN Hx of hep C, reports completed treatment 
-s/p CVC placement  
 
Cholecystitis - us abdomen nondiagnositic. Wbc remains elevated on zosyn.   
  
  
Acute renal failure 
-due pre-renal in the setting of blood loss Cont iv fluids. 
  
HTN 
-hold home meds due to risk of hemodynamic instability 
-IV prn hydralazine 
  
Leukocytosis 
- repeat ct pending to r/o acut echole/pancreatitis. Cont rocephin and add flagyl 
  
Hx of IVDU 
-check UDS 
  
Tobacco use Alcohol use  
-cessation counseled 
-nicotine patch, CIWA protocol, banana bag, fenatnyl 
  
 
 
40 or above Morbid obesity / Body mass index is 43.26 kg/m². Code status: Full Prophylaxis: SCD's Recommended Disposition: Home w/Family Subjective: Chief Complaint / Reason for Physician Visit \"intubated and sedated. \". Discussed with RN events overnight. Review of Systems: 
Symptom Y/N Comments  Symptom Y/N Comments Fever/Chills    Chest Pain Poor Appetite    Edema Cough    Abdominal Pain Sputum    Joint Pain SOB/DOSHI    Pruritis/Rash Nausea/vomit    Tolerating PT/OT Diarrhea    Tolerating Diet Constipation    Other Could NOT obtain due to:   
 
Objective: VITALS:  
Last 24hrs VS reviewed since prior progress note. Most recent are: 
Patient Vitals for the past 24 hrs: 
 Temp Pulse Resp BP SpO2  
19 99.4 °F (37.4 °C) (!) 106 27 152/72 99 % 19  (!) 103 23  100 % 02/08/19 1900  98 28 135/70 99 % 02/08/19 1830  (!) 111 28 154/68 98 % 02/08/19 1800  (!) 111 29 153/69 97 % 02/08/19 1730  (!) 111 30 139/76 97 % 02/08/19 1700  (!) 105 28 141/68 98 % 02/08/19 1630  (!) 104 29 133/67 99 % 02/08/19 1601  (!) 107 30  99 % 02/08/19 1600 99.7 °F (37.6 °C) (!) 106 (!) 31 146/66 99 % 02/08/19 1530  (!) 103 29 143/58 100 % 02/08/19 1500  (!) 103 28 139/69 99 % 02/08/19 1430  (!) 101 28 140/64 99 % 02/08/19 1400  (!) 111 30 159/68 98 % 02/08/19 1330  (!) 111 29 152/73 98 % 02/08/19 1300  (!) 108 29 138/68 99 % 02/08/19 1230  (!) 111 30 138/67 98 % 02/08/19 1207  (!) 113 (!) 32  99 % 02/08/19 1200 99.4 °F (37.4 °C) (!) 115 (!) 31 165/72 99 % 02/08/19 1130  (!) 114 (!) 31 164/77 99 % 02/08/19 1100  (!) 103 26 137/66 99 % 02/08/19 1030  (!) 110 30 143/69 99 % 02/08/19 1000  (!) 107 30 145/68 99 % 02/08/19 0930  (!) 102 30 141/64 98 % 02/08/19 0915  (!) 106 (!) 31 148/54 98 % 02/08/19 0900 99.5 °F (37.5 °C) (!) 108 30 156/69 98 % 02/08/19 0845  (!) 107 30 140/71 98 % 02/08/19 0830  (!) 103 28 146/66 98 % 02/08/19 0815  (!) 107 (!) 31 151/64 96 % 02/08/19 0811  (!) 111 (!) 33  97 % 02/08/19 0800 99.2 °F (37.3 °C) (!) 105 30 152/70 98 % 02/08/19 0745  (!) 104 29 143/65 98 % 02/08/19 0730 99.3 °F (37.4 °C) (!) 104 30 156/70 99 % 02/08/19 0715  (!) 105 29 155/67 99 % 02/08/19 0700 99 °F (37.2 °C) (!) 105 30 156/69 99 % 02/08/19 0645 99 °F (37.2 °C) (!) 105 30 160/64 99 % 02/08/19 0630 99.3 °F (37.4 °C) (!) 104 29 153/72 99 % 02/08/19 0621 99.3 °F (37.4 °C) (!) 104 29 151/66 99 % 02/08/19 0600  (!) 103 27 151/75 100 % 02/08/19 0500  (!) 107 28 163/73 100 % 02/08/19 0418   28    
02/08/19 0400 99.1 °F (37.3 °C) (!) 106 28 158/63 100 % 02/08/19 0300  (!) 108 28 158/73 100 % 02/08/19 0200  (!) 106 28 144/68 99 % 02/08/19 0100  (!) 105 29 148/69 100 % 02/08/19 0021   26   02/08/19 0000 97.9 °F (36.6 °C) 100 27 144/73 100 % 02/07/19 2300  (!) 118 25 137/64 100 % 02/07/19 2200  (!) 122 28 143/67 100 % Intake/Output Summary (Last 24 hours) at 2/8/2019 2109 Last data filed at 2/8/2019 2000 Gross per 24 hour Intake 5258.33 ml Output 3235 ml Net 2023.33 ml PHYSICAL EXAM: 
General: Intubated and sedates EENT:  EOMI. Anicteric sclerae. MMM Resp:  CTA bilaterally, no wheezing or rales. No accessory muscle use CV:  Regular  rhythm,  No edema GI:  Soft, Non distended, Non tender.  +Bowel sounds Neurologic:  sedated Psych:   Good insight. Not anxious nor agitated Skin:  No rashes. No jaundice Reviewed most current lab test results and cultures  YES Reviewed most current radiology test results   YES Review and summation of old records today    NO Reviewed patient's current orders and MAR    YES 
PMH/ reviewed - no change compared to H&P 
________________________________________________________________________ Care Plan discussed with: 
  Comments Patient Family RN Care Manager Consultant Multidiciplinary team rounds were held today with , nursing, pharmacist and clinical coordinator. Patient's plan of care was discussed; medications were reviewed and discharge planning was addressed. ________________________________________________________________________ Total NON critical care TIME:  20   Minutes Total CRITICAL CARE TIME Spent:   Minutes non procedure based Comments >50% of visit spent in counseling and coordination of care    
________________________________________________________________________ Pedro Deng MD  
 
Procedures: see electronic medical records for all procedures/Xrays and details which were not copied into this note but were reviewed prior to creation of Plan. LABS: 
I reviewed today's most current labs and imaging studies. Pertinent labs include: Recent Labs 02/08/19 0310 02/07/19 0310 02/06/19 
8452 WBC 16.1* 23.1* 26.9* HGB 6.8* 7.8* 8.2* HCT 21.8* 25.3* 26.7*  
* 202 204 Recent Labs 02/08/19 0310 02/07/19 
4307 02/06/19 
0361 * 149* 150*  
K 3.4* 3.6 3.3*  
* 120* 121* CO2 20* 20* 22 * 136* 135* BUN 64* 66* 56* CREA 2.32* 2.20* 1.87* CA 7.9* 7.5* 7.6*  
MG  --   --  2.8* PHOS  --  2.6 1.9* ALB  --   --  1.8* TBILI 12.4*  --  7.7* SGOT 337*  --  1,114* ALT  --   --  838* INR  --   --  1.7* Signed: Jo Wagner MD

## 2019-02-09 NOTE — PROGRESS NOTES
0700-Bedside and Verbal shift change report given to Anabela Schafer RN (oncoming nurse) by Katiuska Garrido, RN (offgoing nurse). Report included the following information SBAR, Kardex, ED Summary, Procedure Summary, Intake/Output, MAR, Recent Results and Cardiac Rhythm S tach. 0710-Patient desating in mid 80's and tachypnic in 30's on SBT. Put back on AC. Failed SBT. 0730-New feeding set hung. 0800-Patient has an axillary temp of 101.2. Ice applied to groin, neck, and under arms. 1045-Gifty Kunz at bedside. No changes made. 1300-Family currently all in a meeting with Dr. Nelson Bradford and Dr. Phylicia Rivas. 1630-Family meeting with Dr. Jhon Terry in conference room. Patient's family and wife agreed to NO CPR, but okay will all other interventions for now until they further discuss with other family members. Pink sheet filled out by Dr. Jhon Terry and order placed in computer. 1910-Bedside and Verbal shift change report given to Elizabeth Luciano, RN (oncoming nurse) by Anabela Schafer, RN (offgoing nurse). Report included the following information SBAR, Kardex, ED Summary, Procedure Summary, Intake/Output, MAR, Recent Results and Cardiac Rhythm NSR.

## 2019-02-10 NOTE — PROGRESS NOTES
Hospitalist Progress Note NAME: Stone Teresa :  1955 MRN:  [id number] Assessment / Plan: 
No change over last 24 hours Sepsis, POA 
-acute respiratory failure: vent support  
 
-Acute blood loss anemia, variceal - sp egd,  cont octreotide. Cont to monitor hh (stable). Gi following. Vitamin k x 3 days 
  
 
- encephalopathy- hepatic, metabolic- anoxic EEG showed both Metabolic and Anoxic injury , had a family  meeting today Acute pancreatitis by CT - cont iv fluids, lipase wnl 
 
- Hypernatremnia not helping- worse- 
 
Liver cirrhosis, portal HTN Hx of hep C, reports completed treatment 
-s/p CVC placement  
 
Cholecystitis - us abdomen nondiagnositic. Wbc remains elevated on zosyn.   
  
  
Acute renal failure 
-due pre-renal in the setting of blood loss Cont iv fluids. 
  
HTN 
-hold home meds due to risk of hemodynamic instability 
-IV prn hydralazine 
  
Leukocytosis 
- repeat ct pending to r/o acut echole/pancreatitis. Cont rocephin and add flagyl 
  
Hx of IVDU 
-check UDS 
  
Tobacco use Alcohol use  
-cessation counseled 
-nicotine patch, CIWA protocol, banana bag, fenatnyl 
  
 
 
40 or above Morbid obesity / Body mass index is 43.26 kg/m². Code status: Full Prophylaxis: SCD's Recommended Disposition: Home w/Family Subjective: Chief Complaint / Reason for Physician Visit \"intubated and sedated. \". Discussed with RN events overnight. Review of Systems: 
Symptom Y/N Comments  Symptom Y/N Comments Fever/Chills    Chest Pain Poor Appetite    Edema Cough    Abdominal Pain Sputum    Joint Pain SOB/DOSHI    Pruritis/Rash Nausea/vomit    Tolerating PT/OT Diarrhea    Tolerating Diet Constipation    Other Could NOT obtain due to:   
 
Objective: VITALS:  
Last 24hrs VS reviewed since prior progress note. Most recent are: 
Patient Vitals for the past 24 hrs: 
 Temp Pulse Resp BP SpO2  
19 1848  99 (!) 31  96 % 02/09/19 1800  (!) 109 (!) 32 133/50 95 % 02/09/19 1700  (!) 103 (!) 32 138/58 95 % 02/09/19 1600 99 °F (37.2 °C) 100 (!) 33 137/56 99 % 02/09/19 1542  97 (!) 33  99 % 02/09/19 1500  (!) 101 (!) 32 130/63 98 % 02/09/19 1400  (!) 101 (!) 32 131/57 98 % 02/09/19 1300  (!) 104 (!) 31 140/58 100 % 02/09/19 1200 99.4 °F (37.4 °C) 96 (!) 33 121/59 100 % 02/09/19 1115  (!) 103 (!) 32  100 % 02/09/19 1100  98 28 117/44 99 % 02/09/19 1000 (!) 100.7 °F (38.2 °C) (!) 108 (!) 34 135/61 98 % 02/09/19 0900  (!) 109 (!) 31 141/59 96 % 02/09/19 0805  (!) 110 (!) 32  95 % 02/09/19 0800  (!) 111 30 139/64 95 % 02/09/19 0748 (!) 101.2 °F (38.4 °C)      
02/09/19 0700  (!) 109 30 143/69 91 % 02/09/19 0600  (!) 108 30 132/63 97 % 02/09/19 0548  (!) 108  145/60   
02/09/19 0500  (!) 113 27 145/60 96 % 02/09/19 0400 98.3 °F (36.8 °C) (!) 112 (!) 31 153/67 96 % 02/09/19 0314  (!) 113 30  98 % 02/09/19 0300  (!) 111 (!) 31 150/64 98 % 02/09/19 0200  (!) 108 30 151/70 98 % 02/09/19 0100  100 27 137/63 98 % 02/09/19 0005  (!) 114  149/66   
02/09/19 0000 98.1 °F (36.7 °C) (!) 113 28 149/66 98 % 02/08/19 2314  (!) 119 26  97 % 02/08/19 2300  (!) 118 27 151/68 97 % 02/08/19 2200  (!) 109 28 155/66 98 % 02/08/19 2100  (!) 108 28 159/79 99 % Intake/Output Summary (Last 24 hours) at 2/9/2019 2018 Last data filed at 2/9/2019 1800 Gross per 24 hour Intake 4850 ml Output 2720 ml Net 2130 ml PHYSICAL EXAM: 
General: Intubated and sedates EENT:  EOMI. Anicteric sclerae. MMM Resp:  CTA bilaterally, no wheezing or rales. No accessory muscle use CV:  Regular  rhythm,  No edema GI:  Soft, Non distended, Non tender.  +Bowel sounds Neurologic:  sedated Psych:   Good insight. Not anxious nor agitated Skin:  No rashes. No jaundice Reviewed most current lab test results and cultures  YES Reviewed most current radiology test results   YES 
 Review and summation of old records today    NO Reviewed patient's current orders and MAR    YES 
PMH/SH reviewed - no change compared to H&P 
________________________________________________________________________ Care Plan discussed with: 
  Comments Patient Family RN Care Manager Consultant Multidiciplinary team rounds were held today with , nursing, pharmacist and clinical coordinator. Patient's plan of care was discussed; medications were reviewed and discharge planning was addressed. ________________________________________________________________________ Total NON critical care TIME:  20   Minutes Total CRITICAL CARE TIME Spent:   Minutes non procedure based Comments >50% of visit spent in counseling and coordination of care    
________________________________________________________________________ Radha Garcia MD  
 
Procedures: see electronic medical records for all procedures/Xrays and details which were not copied into this note but were reviewed prior to creation of Plan. LABS: 
I reviewed today's most current labs and imaging studies. Pertinent labs include: 
Recent Labs 02/09/19 
6326 02/08/19 0310 02/07/19 0310 WBC 17.9* 16.1* 23.1* HGB 7.5* 6.8* 7.8* HCT 24.2* 21.8* 25.3*  
* 139* 202 Recent Labs 02/09/19 
0525 02/08/19 0310 02/07/19 
7384 * 148* 149*  
K 3.6 3.4* 3.6 * 118* 120* CO2 21 20* 20* * 135* 136* BUN 60* 64* 66* CREA 2.26* 2.32* 2.20* CA 7.8* 7.9* 7.5* MG 3.4*  --   --   
PHOS 2.4*  --  2.6 ALB 3.0*  --   --   
TBILI 16.1* 12.4*  --   
SGOT 180* 337*  --   
*  --   --   
 
 
Signed: Radha Garcia MD

## 2019-02-10 NOTE — PROGRESS NOTES
hgb drifting down On octreotide Small smear of brown, bloody stool this am 
 
One unit of prbcs planned I will see again later this am or early pm

## 2019-02-10 NOTE — PROGRESS NOTES
Met with family yesterday. 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

## 2019-02-10 NOTE — PROGRESS NOTES
1900  Bedside report from Madhavi Abdul, [facility].

## 2019-02-10 NOTE — PROGRESS NOTES
Gi 
 
I saw marizafly and spoke to nurse about 1400 Only one bloody show 
bp down a bit Plan: 
 
1) q 12 hour h and h 
2) if drop or if bleeding then tagged rbc scan--for clinical or brisk hemorrhage 3) if scan negative then consider egd Graham Alexis MD 
2:56 PM 
2/10/2019
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
## 2019-02-10 NOTE — ROUTINE PROCESS
Bedside and Verbal shift change report received from ROSANNA Mendes RN (offgoing nurse). Report included the following information SBAR, Kardex, ED Summary, Procedure Summary, Intake/Output, MAR, Accordion, Recent Results, Med Rec Status, Cardiac Rhythm Sinus tachycardia with PAC's , Alarm Parameters  and Quality Measures. He is tolerating the Mechanical Ventilator well without facial grimaces. 0945:Noted with a moderate amount of bloody emesis; Tube feeding held and I notified Dr.D. Cleo Bach 1000: notified of the emesis, he will be back to check him later. 1048:Packed shana blood cells initiated. 1115:No signs or symptoms of a transfusion reaction. 1200:No further emesis noted, Contiue to monitor his status. 1400: has returned to the bedside, I received orders to obtain another H&H this evening between 4pm and 5p. 
1415:Packed red blood cells completed per order. Family members in. 
1600: in. No further emesis noted. Repositioned and suctioned for a moderate amount of thick pale white sputum 1615: His wife and children are at the bedside; they are requesting to have FLMA papers signed by the physician. The , Darrell Bach paged. 1620: Informed Dr.D. Valdez of the lowered blood pressure, I received orders to administer Levophed to keep the MAP greater than 65 mmHg. 1635: Marilynn, the  is at the bedside. [phone]: Brenna Erazo is in; he spoke with the family at length about having a Scan completed to locate the bleeding, also he mentioned possibly having a EGD or going to the Interventional Radiology department if there is a bleed located. His wife does not want any further invasive procedures completed. 1700:Levophed initiated at 4 mcq/min. The family remains at the bedside. 1730: His family called me back into the room and asked me to page the doctor for Palliative consultation.   
1745: is at the bedside, and he explained the process of making  comfortable. His wife, is in agreement with ensuring his comfort,  but his son stated, \"I'm not ready for that\" and began to cry. Therefore, his wife, Aspen Cedric, does not want any escalation of care. 1815:Skin care completed and repositioned for comfort. 1900:Systolic blood pressure is significantly declining, one liter NS bolus initiated. Bedside and Verbal shift change report given to ROSANNA Mendes RN (oncoming nurse) by myself (offgoing nurse). Report included the following information SBAR, Kardex, ED Summary, Procedure Summary, Intake/Output, MAR, Accordion, Recent Results, Med Rec Status, Cardiac Rhythm sinus rhythm, Alarm Parameters  and Quality Measures.

## 2019-02-10 NOTE — PROGRESS NOTES
Hospitalist Progress Note NAME: Donnie Gaspar :  1955 MRN:  [id number] Assessment / Plan: 
24 hour event The patient had Hematemesis , Hb dropped and he became hypotensive , He is coagulopathic   And INR  Increased form 1.7---->2.7 ,  Hb after one unit transfusion stayed around 7. Spoke with the wife doesn't want to escalate the care no EGD/or bleeding scan moving toward palliative /ithrawal of care wants the family come for final visit Plan 
 transfuse 4 units of FFP , 2 units of PRBC ,Vitame K Bolus of IVF and bouls of IVF Bolus of NS 1000 wiuth ns@ 125 ml/h D/c metoprolol b/c low BP Spoke with Dr Peter Martinez Sepsis, POA 
-acute respiratory failure: vent support  
 
-Acute blood loss anemia, variceal - sp egd,  cont octreotide. Cont to monitor hh (stable). Gi following. Vitamin k x 3 days 
  
 
- encephalopathy- hepatic, metabolic- anoxic EEG showed both Metabolic and Anoxic injury , had a family  meeting today Acute pancreatitis by CT - cont iv fluids, lipase wnl 
 
- Hypernatremnia not helping- worse- 
 
Liver cirrhosis, portal HTN Hx of hep C, reports completed treatment 
-s/p CVC placement  
 
Cholecystitis - us abdomen nondiagnositic. Wbc remains elevated on zosyn.   
  
  
Acute renal failure 
-due pre-renal in the setting of blood loss Cont iv fluids. 
  
HTN 
-hold home meds due to risk of hemodynamic instability 
-IV prn hydralazine 
  
Leukocytosis 
- repeat ct pending to r/o acut echole/pancreatitis. Cont rocephin and add flagyl 
  
Hx of IVDU 
-check UDS 
  
Tobacco use Alcohol use  
-cessation counseled 
-nicotine patch, CIWA protocol, banana bag, fenatnyl 
  
 
 
40 or above Morbid obesity / Body mass index is 45.09 kg/m². Code status: Full Prophylaxis: SCD's Recommended Disposition: Home w/Family Subjective: Chief Complaint / Reason for Physician Visit \"intubated and sedated. \". Discussed with RN events overnight. Review of Systems: Symptom Y/N Comments  Symptom Y/N Comments Fever/Chills    Chest Pain Poor Appetite    Edema Cough    Abdominal Pain Sputum    Joint Pain SOB/DOSHI    Pruritis/Rash Nausea/vomit    Tolerating PT/OT Diarrhea    Tolerating Diet Constipation    Other Could NOT obtain due to:   
 
Objective: VITALS:  
Last 24hrs VS reviewed since prior progress note. Most recent are: 
Patient Vitals for the past 24 hrs: 
 Temp Pulse Resp BP SpO2  
02/10/19 1730  (!) 112 (!) 39 111/49 96 % 02/10/19 1700  (!) 110 (!) 38 94/45 96 % 02/10/19 1630  (!) 109 (!) 36 (!) 89/45 96 % 02/10/19 1600  (!) 109 (!) 36 91/44 96 % 02/10/19 1547  (!) 111 (!) 36  96 % 02/10/19 1530  (!) 111 (!) 36 94/40 95 % 02/10/19 1515 99 °F (37.2 °C) (!) 108 (!) 36 94/42 96 % 02/10/19 1500  (!) 109 (!) 35 93/42 96 % 02/10/19 1430  (!) 110 (!) 37 (!) 95/38 96 % 02/10/19 1415  (!) 111 (!) 36  97 % 02/10/19 1400  (!) 110 (!) 36 94/41 97 % 02/10/19 1330  (!) 110 (!) 35 98/44 97 % 02/10/19 1300 98.6 °F (37 °C) (!) 108 (!) 34 94/40 97 % 02/10/19 1245 98.6 °F (37 °C) (!) 107 29 112/64 97 % 02/10/19 1230  (!) 108 (!) 37 109/49 96 % 02/10/19 1215 99.4 °F (37.4 °C) (!) 107 (!) 34 109/47 96 % 02/10/19 1200 99.4 °F (37.4 °C) (!) 108 (!) 35 94/42 96 % 02/10/19 1145 99.4 °F (37.4 °C) (!) 109 (!) 34 106/44 96 % 02/10/19 1130 98.6 °F (37 °C) (!) 107 (!) 35 110/46 95 % 02/10/19 1115  (!) 105 (!) 35 115/47 95 % 02/10/19 1100 98.4 °F (36.9 °C) (!) 103 (!) 34 104/44 94 % 02/10/19 1057  (!) 104 (!) 34  94 % 02/10/19 1045 97.5 °F (36.4 °C) (!) 101 (!) 31 104/45 92 % 02/10/19 1030  100 (!) 32  93 % 02/10/19 1015  99 (!) 34  93 % 02/10/19 1000  100 (!) 33 109/46 93 % 02/10/19 0900  (!) 105 (!) 34 115/47 93 % 02/10/19 0802  (!) 106 (!) 31  97 % 02/10/19 0800  (!) 105 (!) 34 112/53 97 % 02/10/19 0748 99.4 °F (37.4 °C) (!) 104 (!) 36 126/54 98 % 02/10/19 0700  (!) 104 (!) 34 123/45 97 % 02/10/19 0600  (!) 101 (!) 34 106/48 98 % 02/10/19 0500  (!) 106 (!) 33 122/52 96 % 02/10/19 0400  (!) 103 (!) 33 126/45 97 % 02/10/19 0326  96 (!) 35  98 % 02/10/19 0300 99.1 °F (37.3 °C) (!) 104 (!) 34 123/46 98 % 02/10/19 0200  (!) 103 (!) 34 109/48 99 % 02/10/19 0100  (!) 103 (!) 34 119/52 99 % 02/10/19 0000  (!) 101 (!) 34  100 % 02/09/19 2325  (!) 107 (!) 32  99 % 02/09/19 2300 99.1 °F (37.3 °C) (!) 107 (!) 34 123/60 98 % 02/09/19 2200  (!) 109 (!) 35 109/47 98 % 02/09/19 2100  (!) 107 (!) 34 97/45 97 % 02/09/19 2000 99.7 °F (37.6 °C) (!) 103 30 123/51 (!) 89 % 02/09/19 1900  (!) 101 (!) 32 127/49 96 % 02/09/19 1848  99 (!) 31  96 % Intake/Output Summary (Last 24 hours) at 2/10/2019 1821 Last data filed at 2/10/2019 1415 Gross per 24 hour Intake 4015 ml Output 1450 ml Net 2565 ml PHYSICAL EXAM: 
General: Intubated and sedates EENT:  EOMI. Anicteric sclerae. MMM Resp:  CTA bilaterally, no wheezing or rales. No accessory muscle use CV:  Regular  rhythm,  No edema GI:  Soft, Non distended, Non tender.  +Bowel sounds Neurologic:  sedated Psych:   Good insight. Not anxious nor agitated Skin:  No rashes. No jaundice Reviewed most current lab test results and cultures  YES Reviewed most current radiology test results   YES Review and summation of old records today    NO Reviewed patient's current orders and MAR    YES 
PMH/SH reviewed - no change compared to H&P 
________________________________________________________________________ Care Plan discussed with: 
  Comments Patient Family RN Care Manager Consultant Multidiciplinary team rounds were held today with , nursing, pharmacist and clinical coordinator. Patient's plan of care was discussed; medications were reviewed and discharge planning was addressed. ________________________________________________________________________ Total NON critical care TIME:  20   Minutes Total CRITICAL CARE TIME Spent:   Minutes non procedure based Comments >50% of visit spent in counseling and coordination of care    
________________________________________________________________________ Briana Cody MD  
 
Procedures: see electronic medical records for all procedures/Xrays and details which were not copied into this note but were reviewed prior to creation of Plan. LABS: 
I reviewed today's most current labs and imaging studies. Pertinent labs include: 
Recent Labs  
  02/10/19 
1553 02/10/19 
0508 02/09/19 
0547 02/08/19 
0310 WBC  --  20.3* 17.9* 16.1* HGB 6.9* 6.8* 7.5* 6.8* HCT 22.9* 22.1* 24.2* 21.8* PLT  --  135* 140* 139* Recent Labs  
  02/10/19 
1209 02/10/19 
6240 02/09/19 
0547 02/08/19 
0310 NA  --  154* 152* 148* K  --  3.4* 3.6 3.4*  
CL  --  125* 122* 118* CO2  --  17* 21 20* GLU  --  112* 120* 135* BUN  --  65* 60* 64* CREA  --  2.75* 2.26* 2.32* CA  --  7.6* 7.8* 7.9*  
MG  --  3.5* 3.4*  --   
PHOS  --  2.7 2.4*  --   
ALB  --  3.3* 3.0*  --   
TBILI  --  18.8* 16.1* 12.4* SGOT  --  105* 180* 337* ALT  --  139* 232*  --   
INR 2.7*  --   --   --   
 
 
Signed: Briana Cody MD

## 2019-02-10 NOTE — PROGRESS NOTES
F/U for hepatic failure Encephalopathy Tila Finn for [facility] S: Mr. Kunal Zee was seen by me today during rounds. At this time, he is resting + comfortably. He is supine, on the ventilator and is not arouse able. I cannot get pfsh, ros, cc or hpi from him. O: Blood pressure 115/47, pulse (!) 105, temperature 99.4 °F (37.4 °C), resp. rate (!) 34, weight 130.6 kg (287 lb 14.7 oz), SpO2 93 %. Gen: Patient is in no acute distress. There is +++ jaundice. Lungs: Clear to auscultation bilaterally . Labored breathing Heart:+RRR. Abd: Soft, non tender, non-distended, bowel sounds present. Extremities: Warm. Cross sectional imaging:  None new Lab Results Component Value Date/Time WBC 20.3 (H) 02/10/2019 05:08 AM  
 HGB 6.8 (L) 02/10/2019 05:08 AM  
 HCT 22.1 (L) 02/10/2019 05:08 AM  
 PLATELET 568 (L) 35/75/9872 05:08 AM  
 .3 (H) 02/10/2019 05:08 AM  
 
Lab Results Component Value Date/Time Sodium 154 (H) 02/10/2019 05:08 AM  
 Potassium 3.4 (L) 02/10/2019 05:08 AM  
 Chloride 125 (H) 02/10/2019 05:08 AM  
 CO2 17 (L) 02/10/2019 05:08 AM  
 Anion gap 12 02/10/2019 05:08 AM  
 Glucose 112 (H) 02/10/2019 05:08 AM  
 BUN 65 (H) 02/10/2019 05:08 AM  
 Creatinine 2.75 (H) 02/10/2019 05:08 AM  
 BUN/Creatinine ratio 24 (H) 02/10/2019 05:08 AM  
 GFR est AA 28 (L) 02/10/2019 05:08 AM  
 GFR est non-AA 23 (L) 02/10/2019 05:08 AM  
 Calcium 7.6 (L) 02/10/2019 05:08 AM  
 Bilirubin, total 18.8 (H) 02/10/2019 05:08 AM  
 AST (SGOT) 105 (H) 02/10/2019 05:08 AM  
 Alk. phosphatase 102 02/10/2019 05:08 AM  
 Protein, total 5.9 (L) 02/10/2019 05:08 AM  
 Albumin 3.3 (L) 02/10/2019 05:08 AM  
 Globulin 2.6 02/10/2019 05:08 AM  
 A-G Ratio 1.3 02/10/2019 05:08 AM  
 ALT (SGPT) 139 (H) 02/10/2019 05:08 AM  
 ammonia:   
68 Lab Results Component Value Date/Time  INR 1.7 (H) 02/06/2019 03:33 AM  
 INR 1.8 (H) 02/05/2019 02:52 AM  
 INR 1.7 (H) 02/04/2019 04:17 AM  
 Prothrombin time 17.3 (H) 02/06/2019 03:33 AM  
 Prothrombin time 18.2 (H) 02/05/2019 02:52 AM  
 Prothrombin time 16.9 (H) 02/04/2019 04:17 AM  
 
 
A: Active Problems: 
  GI bleed (2/2/2019) Comment:  The patient is critcally ill and has a poor prognosis P:  Given degree of bili elevation, I think best to stop xifaxin Dr Samia Pyle back tomorrow I spoke with wife by phone about xifaxin stopping Latesha Enriquez MD 
9:37 AM 
/2/10/2019

## 2019-02-10 NOTE — PROGRESS NOTES
Problem: Pressure Injury - Risk of 
Goal: *Prevention of pressure injury Document Naresh Scale and appropriate interventions in the flowsheet. Outcome: Progressing Towards Goal 
Pressure Injury Interventions: 
Sensory Interventions: Assess changes in LOC, Assess need for specialty bed, Chair cushion, Check visual cues for pain, Discuss PT/OT consult with provider, Float heels, Keep linens dry and wrinkle-free, Maintain/enhance activity level, Minimize linen layers, Monitor skin under medical devices, Pad between skin to skin, Pressure redistribution bed/mattress (bed type), Turn and reposition approx. every two hours (pillows and wedges if needed), Use 30-degree side-lying position Moisture Interventions: Absorbent underpads, Apply protective barrier, creams and emollients, Assess need for specialty bed, Check for incontinence Q2 hours and as needed, Contain wound drainage, Internal/External fecal devices, Internal/External urinary devices, Limit adult briefs, Maintain skin hydration (lotion/cream), Minimize layers, Moisture barrier, Offer toileting Q_hr Activity Interventions: Assess need for specialty bed, Pressure redistribution bed/mattress(bed type), PT/OT evaluation Mobility Interventions: Assess need for specialty bed, Float heels, HOB 30 degrees or less, Pressure redistribution bed/mattress (bed type), PT/OT evaluation, Turn and reposition approx. every two hours(pillow and wedges) Nutrition Interventions: Document food/fluid/supplement intake, Discuss nutritional consult with provider, Offer support with meals,snacks and hydration Friction and Shear Interventions: Apply protective barrier, creams and emollients, Feet elevated on foot rest, Foam dressings/transparent film/skin sealants, HOB 30 degrees or less, Lift sheet, Lift team/patient mobility team, Minimize layers, Transferring/repositioning devices Problem: Falls - Risk of 
Goal: *Absence of Falls Document Dewy Rose Officer Fall Risk and appropriate interventions in the flowsheet. Outcome: Progressing Towards Goal 
Fall Risk Interventions: 
Mobility Interventions: Bed/chair exit alarm Mentation Interventions: Adequate sleep, hydration, pain control, Bed/chair exit alarm, Door open when patient unattended, Increase mobility, More frequent rounding, Reorient patient, Room close to nurse's station, Toileting rounds, Update white board Medication Interventions: Bed/chair exit alarm Elimination Interventions: Call light in reach History of Falls Interventions: Bed/chair exit alarm, Consult care management for discharge planning, Door open when patient unattended

## 2019-02-11 ENCOUNTER — APPOINTMENT (OUTPATIENT)
Dept: GENERAL RADIOLOGY | Age: 64
DRG: 432 | End: 2019-02-11
Attending: INTERNAL MEDICINE
Payer: MEDICARE

## 2019-02-11 VITALS
WEIGHT: 287.92 LBS | OXYGEN SATURATION: 77 % | BODY MASS INDEX: 45.09 KG/M2 | SYSTOLIC BLOOD PRESSURE: 64 MMHG | TEMPERATURE: 100.8 F | HEART RATE: 133 BPM | DIASTOLIC BLOOD PRESSURE: 16 MMHG | RESPIRATION RATE: 8 BRPM

## 2019-02-11 LAB
ABO + RH BLD: NORMAL
BLD PROD TYP BPU: NORMAL
BLOOD GROUP ANTIBODIES SERPL: NORMAL
BPU ID: NORMAL
CROSSMATCH RESULT,%XM: NORMAL
SPECIMEN EXP DATE BLD: NORMAL
STATUS OF UNIT,%ST: NORMAL
UNIT DIVISION, %UDIV: 0

## 2019-02-11 NOTE — PROGRESS NOTES
Called to examine patient who has . No response to verbal and tactile stimuli. No respiratory effort. Absent heart sounds and pulses. Pupils fixed and dilated. Patient pronounced dead at 22:55  Darnell Doe MD 
 Hospitalist

## 2019-02-11 NOTE — DISCHARGE SUMMARY
Discharge Summary PATIENT ID: Ruby Torres MRN: [id number] YOB: 1955 DATE OF ADMISSION: 2/2/2019  7:42 AM   
DATE OF DISCHARGE: 2/11/2019 PRIMARY CARE PROVIDER: Иван Capellan NP  
 
 
DISCHARGING PHYSICIAN: Kayla Urrutia MD   
To contact this individual call [phone]. CONSULTATIONS: IP CONSULT TO NEUROLOGY PROCEDURES/SURGERIES: Procedure(s): ESOPHAGOGASTRODUODENOSCOPY (EGD) ENDOSCOPIC BANDING OR LIGATION X4 
 
ADMITTING [address] [place] [address] COURSE:  
Mario Vines is a 61 y.o.  male with PMHx significant for for hx of IVDU, present to the [facility] c/o generalized abd pain associated with dark emesis and melena for ~4 days. Pt was noted to have about ~500cc black vomitus at [facility]. He was subsequently transferred here for GI evaluation. On arrival, pt had ~100cc black vomitus. Continues to complain of abd pain 8/10 in severity. He admits to alcohol use and recent IV heroin use. He denies associated fever, chills, cp, sob, palpitations. Denies any asa/plavix/OAC use. Pt was started on rocephin, zosyn, and admitted to ICU. His course of admission was complication with recurrent GI  Bleed , hypotension , acute blood loss , required intubation for airway protection , Anoxic and Metabolic encephalopathy, anoxic and metabolic as per EEG  Done during this  addmision   Acute Kidney injury and multiorgan failure. The was evidence of pancreatitis on  CT . On 2/10/19The patient had Hematemesis , Hb dropped and he became hypotensive , He BECAME more  coagulopathic   And INR  Increased form 1.7---->2.7 , His Hb dropped Hb after one unit transfusion stayed around 7.  Spoke with the wife doesn't want to escalate the care no EGD/or bleeding scan moving toward palliative /withrawal of care wants the family come for final visit we proceed with with supportive care as per her wish s/p ordering  transfuse 4 units of FFP , 2 units of PRBC ,Vitame K Bolus of IVF and bouls of IVF,Bolus of NS 1000 chyna ns@ 125 ml/h D/c metoprolol b/c low BP. Spoke an update un call Gastroenterologist. Unfortunately the patient did not respond to the treatment and he  on Pat at 22:55  on 2/10/19. Family informed. DISCHARGE DIAGNOSES / PLAN:   
 
1-Death due to complication of Chirosis including Encephalopathy, coagulopathy , GI  Bleed    And anoxic brain injury  Most likely secondary to hypoperfusion 2- sepsis  Ruled out Recent Results (from the past 24 hour(s)) PTT Collection Time: 02/10/19 12:09 PM  
Result Value Ref Range aPTT 43.6 (H) 22.1 - 32.0 sec  
 aPTT, therapeutic range     58.0 - 77.0 SECS  
PROTHROMBIN TIME + INR Collection Time: 02/10/19 12:09 PM  
Result Value Ref Range INR 2.7 (H) 0.9 - 1.1 Prothrombin time 25.9 (H) 9.0 - 11.1 sec FIBRINOGEN Collection Time: 02/10/19 12:09 PM  
Result Value Ref Range Fibrinogen 95 (L) 200 - 475 mg/dL HGB & HCT Collection Time: 02/10/19  3:53 PM  
Result Value Ref Range HGB 6.9 (L) 12.1 - 17.0 g/dL HCT 22.9 (L) 36.6 - 50.3 % FFP, ALLOCATE Collection Time: 02/10/19  6:30 PM  
Result Value Ref Range Unit number [id number] Blood component type FP 24h, Thaw Unit division 00 Status of unit TRANSFUSED Unit number [id number] Blood component type FP 24h, Thaw Unit division 00 Status of unit TRANSFUSED Unit number [id number] Blood component type FP 24h, Thaw Unit division 00 Status of unit TRANSFUSED Unit number [id number] Blood component type FP 24h, Thaw Unit division 00 Status of unit TRANSFUSED DISCHARGE MEDICATIONS: 
Discharge Medication List as of 2019  3:58 AM  
  
 
 
 
DISPOSITION: 
  Home With: 
 OT  PT  HH  RN  
  
 Long term SNF/Inpatient Rehab Independent/assisted living Hospice Other:  
 
 
PATIENT CONDITION AT DISCHARGE:  
 
Functional status Poor Deconditioned Independent Cognition [place] Forgetful Dementia Catheters/lines (plus indication) Flor PICC   
 PEG None Code status Full code DNR   
 
PHYSICAL EXAMINATION AT DISCHARGE Visit Vitals BP (!) 64/16 Pulse (!) 133 Temp (!) 100.8 °F (38.2 °C) Resp 8 Wt 130.6 kg (287 lb 14.7 oz) SpO2 (!) 77% BMI 45.09 kg/m² Temp (24hrs), Av.7 °F (37.6 °C), Min:97.5 °F (36.4 °C), Max:101.8 °F (38.8 °C) O2 Device: Endotracheal tube, Ventilator Patient Vitals for the past 24 hrs: 
 Temp Pulse Resp BP SpO2  
02/10/19 2245  (!) 133 8 (!) 64/16 (!) 77 % 02/10/19 2230  (!) 116 29 (!) 51/37 96 % 02/10/19 2215  (!) 116 30 (!) 115/28   
02/10/19 2200  (!) 113 (!) 32 114/76   
02/10/19 2145  (!) 122 19 (!) 61/20   
02/10/19 2142 (!) 100.8 °F (38.2 °C)      
02/10/19 2130 (!) 101 °F (38.3 °C) (!) 120 (!) 31 (!) 87/44   
02/10/19 2115  (!) 119 22 (!) 87/48   
02/10/19 2111  (!) 125 (!) 33 123/59   
02/10/19 2104  (!) 130 (!) 35 162/76 97 % 02/10/19 2100  (!) 125 (!) 34  96 % 02/10/19 2056  (!) 109 (!) 35  94 % 02/10/19 2045  86 28  99 % 02/10/19 2030  99 22 (!) 54/28 100 % 02/10/19 2015  92 (!) 34 (!) 72/33 96 % 02/10/19 2002  89 (!) 32 (!) 59/31 98 % 02/10/19 2000 (!) 101.5 °F (38.6 °C) 90 (!) 32 (!) 58/33 100 % 02/10/19 1956  98 (!) 32  100 % 02/10/19 194 (!) 101.6 °F (38.7 °C) 100 (!) 38 (!) 72/37 99 % 02/10/19 193 (!) 101.8 °F (38.8 °C) 100 (!) 37 (!) 84/51 97 % 02/10/19 193  99 (!) 39 (!) 84/51 96 % 02/10/19 191  99 (!) 36 90/41 95 % 02/10/19 1900  (!) 102 (!) 37 (!) 79/40 96 % 02/10/19 1830  100 (!) 38 92/42 96 % 02/10/19 1800  (!) 108 (!) 36 113/48 95 % 02/10/19 1730  (!) 112 (!) 39 111/49 96 % 02/10/19 1700  (!) 110 (!) 38 94/45 96 % 02/10/19 1630  (!) 109 (!) 36 (!) 89/45 96 % 02/10/19 1600  (!) 109 (!) 36 91/44 96 % 02/10/19 1547  (!) 111 (!) 36  96 % 02/10/19 1530  (!) 111 (!) 36 94/40 95 % 02/10/19 1515 99 °F (37.2 °C) (!) 108 (!) 36 94/42 96 % 02/10/19 1500  (!) 109 (!) 35 93/42 96 % 02/10/19 1430  (!) 110 (!) 37 (!) 95/38 96 % 02/10/19 1415  (!) 111 (!) 36  97 % 02/10/19 1400  (!) 110 (!) 36 94/41 97 % 02/10/19 1330  (!) 110 (!) 35 98/44 97 % 02/10/19 1300 98.6 °F (37 °C) (!) 108 (!) 34 94/40 97 % 02/10/19 1245 98.6 °F (37 °C) (!) 107 29 112/64 97 % 02/10/19 1230  (!) 108 (!) 37 109/49 96 % 02/10/19 1215 99.4 °F (37.4 °C) (!) 107 (!) 34 109/47 96 % 02/10/19 1200 99.4 °F (37.4 °C) (!) 108 (!) 35 94/42 96 % 02/10/19 1145 99.4 °F (37.4 °C) (!) 109 (!) 34 106/44 96 % 02/10/19 1130 98.6 °F (37 °C) (!) 107 (!) 35 110/46 95 % 02/10/19 1115  (!) 105 (!) 35 115/47 95 % 02/10/19 1100 98.4 °F (36.9 °C) (!) 103 (!) 34 104/44 94 % 02/10/19 1057  (!) 104 (!) 34  94 % 02/10/19 1045 97.5 °F (36.4 °C) (!) 101 (!) 31 104/45 92 % 02/10/19 1030  100 (!) 32  93 % Intake/Output Summary (Last 24 hours) at 2/11/2019 1015 Last data filed at 2/10/2019 2107 Gross per 24 hour Intake 4082.66 ml Output 300 ml Net 3782.66 ml Last shift: 
  02/11 0701 - 02/11 1900 In: 9452.7 [I.V.:6058.5] Out: 2800 [Urine:1150; Drains:1650] Last 3 shifts: 
  02/09 1901 - 02/11 0700 In: 7454 [I.V.:2100] Out: 6878 [Urine:300; Drains:1150] CHRONIC MEDICAL DIAGNOSES: 
Problem List as of 2/10/2019 Date Reviewed: 2/4/2019 Codes Class Noted - Resolved GI bleed ICD-10-CM: K92.2 ICD-9-CM: 578.9  2/2/2019 - Present Cellulitis ICD-10-CM: L03.90 ICD-9-CM: 682.9  1/1/2019 - Present Hypertension ICD-10-CM: I10 
ICD-9-CM: 401.9  Unknown - Present Hepatitis C, chronic (HCC) ICD-10-CM: B18.2 ICD-9-CM: 070.54  Unknown - Present Esophageal varices (HCC) ICD-10-CM: I85.00 ICD-9-CM: 456.1  Unknown - Present Thrombocytopathia ([facility] ICD-10-CM: D69.1 ICD-9-CM: 287.1  Unknown - Present Overview Signed 11/16/2018 12:27 PM by Jamie Edmond NP  
  secondary to Hep C Obesity, morbid ([facility] ICD-10-CM: E66.01 
ICD-9-CM: 278.01  11/30/2017 - Present Cirrhosis ([facility] ICD-10-CM: K74.60 ICD-9-CM: 571.5  9/26/2017 - Present Lipidemia ICD-10-CM: E78.5 ICD-9-CM: 272.4  1/4/2016 - Present Depression with anxiety ICD-10-CM: F41.8 ICD-9-CM: 300.4  10/26/2015 - Present Erectile dysfunction ICD-10-CM: N52.9 ICD-9-CM: 607.84  10/26/2015 - Present History of heroin use ICD-10-CM: Z86.59 
ICD-9-CM: V11.8  7/1/2015 - Present Chronic back pain greater than 3 months duration ICD-10-CM: M54.9, G89.29 ICD-9-CM: 724.5, 338.29  6/5/2015 - Present Essential hypertension ICD-10-CM: I10 
ICD-9-CM: 401.9  6/5/2015 - Present Chronic hepatitis C ([facility] ICD-10-CM: B18.2 ICD-9-CM: 070.54  5/21/2015 - Present Edema, peripheral ICD-10-CM: R60.9 ICD-9-CM: 782.3  5/6/2011 - Present Substance abuse ([facility] ICD-10-CM: F19.10 ICD-9-CM: 305.90  5/6/2011 - Present RESOLVED: Hypertension ICD-10-CM: I10 
ICD-9-CM: 401.9  5/6/2011 - 6/5/2015 RESOLVED: Cellulitis ICD-10-CM: L03.90 ICD-9-CM: 682.9  5/6/2011 - 6/5/2015 Greater than  45 minutes were spent with the patient on counseling and coordination of care Signed:  
Denise Sanchez MD 
2/11/2019 
10:15 AM

## 2019-02-11 NOTE — PROGRESS NOTES
was called in for the death of the patient in CCU.  ministered to wife, family and friends that had gathered to be with the patient.  prayed with family at the bedside of the patient.  provided pastoral support and care to the family and friends.  provided grief support to several family members present.  assisted staff with the family and provided support to staff. Casey Rodney Pager: [phone]

## 2019-02-11 NOTE — PROGRESS NOTES
Patient was made [address] only. Patient's ventilator was turned down to CPAP 6 PSV 6 FIO2 21% because of risk of taking ET tube out. Patient has blood clots in vocal cord area.

## 2019-02-11 NOTE — PROGRESS NOTES
1900  Bedside report from [place], [place]. Pt is now a full DNR per wife. Pink sheet in chart. Levophed running, but no escalation of vasopressors. 1950  Pt temp. 101.8 message sent to tele MD for liquid tylenol per NGT. 
 
2001  Orders received from Dr. Daisy García for Tylenol 650mg Q6H PRN fever. 2030  Pt pressure continues to drop despite increasing Levophed as ordered. Pt is now bleeding profusely from mouth. Flexiseal has drained 800mL of black liquid stool since start of shift. Message sent to Dr. Daisy García. 2050  Spoke with Dr. Daisy García. Advised that I am attempting to get in touch with wife and communicate the situation before starting any other pressors. Dr. Daisy García ordered to increase Levophed as quickly as needed until max dose reached. Then 1L NS bolus if still unable to reach wife. 2055  Spoke with pts son and wife. They are on the way to the hospital.  Wife requests that no new vasopressors be started at this time. 2115  Wife, son and other extended family at bedside. Advised family of current situation. 4 FFP given and second unit of PRBC's of the day currently running. Levophed is running at max dose. Pt pressures continue to drop. 2205  About to start second unit os PRBC's when pt had another episode of profuse bleeding from the mouth. Pts wife requests to speak to MD about comfort care. Second unit of blood was not started, but was already spiked and tubing prepped when wife requested to stop treatment. 1 unit PRBC's was not given and unable to return to blood bank. 2215  Dr. Kayleigh Davies came up from Er to speak with family about comfort care measures. Orders received for comfort care medications, to stop all IV therapy, and turn vent FiO2 down to 21% to create room air equivalent oxygen and prevent choking on blood. 0  Family at bedside ready to start comfort care measures. Meds given, see MAR for details.   FiO2 turned down to 21%, and all IV therapy turned off.  Tissues and water given to family members for comfort. Extra chairs provided. Curtain and door closed for privacy. 2255  Pt is asystole with no pulse. Strip printed. Called Dr. Hamida Sharma to pronounce. 2315  Dr. Hamida Sharma at bedside to pronounce. TOD 2255 on 02/10/2019. 
 
2320  Pt and room cleaned and cleared of all unnecessary medical equipment for family comfort.

## 2019-02-11 NOTE — PROGRESS NOTES
I spoke to [place],  I spoke to Dr Gordon Melgoza. I am aware of hypotension and stable hgb despite one unit of blood When I spoke to Dr. Beth Miranda I offerred to come and do emergent egd  He informed me that family does not want tagged rbc scan or egd at this time 
 
 on call physician aware of patient Vanita Willson MD 
8:01 PM 
2/10/2019

## 2019-02-11 NOTE — PROGRESS NOTES
I was paged to come and see Patient as wife decided to proceed for comfort care measures for Mr. Charleen Son, discussed with her about Mr. Charleen Son dismal prognosis, order placed for comfort care measures only.

## 2019-02-12 LAB
HCV GENOTYPE: NORMAL
HCV RNA SERPL NAA+PROBE-ACNC: NORMAL IU/ML
HCV RNA SERPL NAA+PROBE-LOG IU: NORMAL LOG10 IU/ML
TEST INFORMATION, 550045: NORMAL

## 2019-06-13 NOTE — ED NOTES
Dr. Lisa Estrada to place a right subclavian central line NUTRITION     Chart reviewed. Post-op bariatric diet instruction completed. Has MVI at home but not other vitamins. Reviewed recommendations and encouraged to obtain shortly after discharge. Pt asking for IV pain meds since oral not helping last night. Will gladly follow up for additional questions as needed. Thank you.      Scottie Armenta RD

## 2020-09-08 NOTE — LETTER
3/13/2017 2:46 PM 
 
Mr. Miguel Montgomery [address] [facility] [id number] Gladys Lugo [address], This letter is to inform you that an appointment has been scheduled for you at the [address] [place] [address] at [facility] on Friday March 17th, 2017 at 1:45 PM. The address is [address] [place] [address] with Dr. Aide Hopkins. The number to reach the center is [phone]. Please arrive 20 minutes early to complete paperwork. Please take your medications with you also. A referral will be sent to the [address] [place] [address] prior to your appointment. If you have any questions, feel free to call me at [phone] Sincerely, Gemma Mike LPN Panel Manager no

## 2021-01-26 NOTE — PROGRESS NOTES
TRANSFER - IN REPORT: 
 
Verbal report received from [place] rn(name) on [address]  being received from Emergency room(unit) for routine progression of care Report consisted of patients Situation, Background, Assessment and  
Recommendations(SBAR). Information from the following report(s) SBAR was reviewed with the receiving nurse. Opportunity for questions and clarification was provided. Assessment completed upon patients arrival to unit and care assumed. No

## 2024-07-11 NOTE — PROGRESS NOTES
02/08/19 0630 ABCDEF Bundle SBT Safety Screen Passed Yes SBT Trial Passed Yes Weaning Parameters Spontaneous Breathing Trial Complete Yes Resp Rate Observed 29 Ve 13.4  RSBI 63 Patient remains on CPAP+6, PS 6, 30% 0

## (undated) DEVICE — MEDI-VAC YANK SUCT HNDL W/TPRD BULBOUS TIP: Brand: CARDINAL HEALTH

## (undated) DEVICE — SOLIDIFIER FLUID 3000 CC ABSORB

## (undated) DEVICE — KIT IV STRT W CHLORAPREP PD 1ML

## (undated) DEVICE — KENDALL RADIOLUCENT FOAM MONITORING ELECTRODE -RECTANGULAR SHAPE: Brand: KENDALL

## (undated) DEVICE — 1200 GUARD II KIT W/5MM TUBE W/O VAC TUBE: Brand: GUARDIAN

## (undated) DEVICE — SET EXTN TBNG L BOR 4 W STPCOCK ST 32IN PRIMING VOL 6ML

## (undated) DEVICE — BAG BELONG PT PERS CLEAR HANDL

## (undated) DEVICE — SET ADMIN 16ML TBNG L100IN 2 Y INJ SITE IV PIGGY BK DISP

## (undated) DEVICE — CATH IV AUTOGRD BC PNK 20GA 25 -- INSYTE

## (undated) DEVICE — BLOCK BITE ENDOSCP AD 21 MM W/ DIL BLU LF DISP

## (undated) DEVICE — Z DISCONTINUED PER MEDLINE LINE GAS SAMPLING O2/CO2 LNG AD 13 FT NSL W/ TBNG FILTERLINE

## (undated) DEVICE — NEONATAL-ADULT SPO2 SENSOR: Brand: NELLCOR

## (undated) DEVICE — MULTIPLE BAND LIGATOR: Brand: SPEEDBAND SUPERVIEW SUPER 7

## (undated) DEVICE — CATH IV AUTOGRD BC BLU 22GA 25 -- INSYTE

## (undated) DEVICE — ENDO CARRY-ON PROCEDURE KIT INCLUDES ENZYMATIC SPONGE, GAUZE, BIOHAZARD LABEL, TRAY, LUBRICANT, DIRTY SCOPE LABEL, WATER LABEL, TRAY, DRAWSTRING PAD, AND DEFENDO 4-PIECE KIT.: Brand: ENDO CARRY-ON PROCEDURE KIT

## (undated) DEVICE — SOLIDIFIER MEDC 1200ML -- CONVERT TO 356117

## (undated) DEVICE — Device

## (undated) DEVICE — SYRINGE MED 20ML STD CLR PLAS LUERLOCK TIP N CTRL DISP

## (undated) DEVICE — KENDALL RADIOLUCENT FOAM MONITORING ELECTRODE RECTANGULAR SHAPE: Brand: KENDALL

## (undated) DEVICE — CANN NASAL O2 CAPNOGRAPHY AD -- FILTERLINE

## (undated) DEVICE — BASIN EMSIS 16OZ GRAPHITE PLAS KID SHP MOLD GRAD FOR ORAL

## (undated) DEVICE — NEEDLE HYPO 18GA L1.5IN PNK S STL HUB POLYPR SHLD REG BVL

## (undated) DEVICE — BW-412T DISP COMBO CLEANING BRUSH: Brand: SINGLE USE COMBINATION CLEANING BRUSH

## (undated) DEVICE — TOWEL 4 PLY TISS 19X30 SUE WHT

## (undated) DEVICE — SYR 10ML LUER LOK 1/5ML GRAD --

## (undated) DEVICE — SYR 3ML LL TIP 1/10ML GRAD --